# Patient Record
Sex: FEMALE | Race: WHITE | NOT HISPANIC OR LATINO | Employment: FULL TIME | URBAN - METROPOLITAN AREA
[De-identification: names, ages, dates, MRNs, and addresses within clinical notes are randomized per-mention and may not be internally consistent; named-entity substitution may affect disease eponyms.]

---

## 2017-01-16 ENCOUNTER — OFFICE VISIT (OUTPATIENT)
Dept: FAMILY MEDICINE | Facility: CLINIC | Age: 32
End: 2017-01-16
Payer: COMMERCIAL

## 2017-01-16 VITALS
TEMPERATURE: 98.3 F | HEIGHT: 60 IN | BODY MASS INDEX: 44.37 KG/M2 | DIASTOLIC BLOOD PRESSURE: 80 MMHG | WEIGHT: 226 LBS | SYSTOLIC BLOOD PRESSURE: 134 MMHG | HEART RATE: 76 BPM

## 2017-01-16 DIAGNOSIS — M65.4 RADIAL STYLOID TENOSYNOVITIS OF RIGHT HAND: Primary | ICD-10-CM

## 2017-01-16 PROCEDURE — 99213 OFFICE O/P EST LOW 20 MIN: CPT | Performed by: PHYSICIAN ASSISTANT

## 2017-01-16 NOTE — MR AVS SNAPSHOT
After Visit Summary   1/16/2017    Ruby Morrison    MRN: [id number]           Patient Information     Date Of Birth          1985        Visit Information        Provider Department      1/16/2017 3:20 PM Renée Hudson PA-C [facility]        Today's Diagnoses     Radial styloid tenosynovitis of right hand    -  1       Care Instructions    -- Aleve twice daily with food x 1 week  -- Wear brace consistently  -- Ice on tendon 3 times daily x 20 minutes  -- Schedule with ortho if not improving.                 De Quervain's Tenosynovitis      What is de Quervain's tenosynovitis?   De Quervain's tenosynovitis is a painful condition that affects the tendons on the thumb side of your wrist. Tendons, are strong bands of connective tissue that attach muscle to bone. A sheath, or covering, surrounds the tendons that go to your thumb. Tenosynovitis is an irritation of this sheath.   How does it occur?   De Quervain's tenosynovitis is usually cause by overusing your thumb or wrist. This is more likely in activities when your wrist is bent and you use your thumb to  something (such as when you ski or hammer).   Other causes of this condition include:   wrist injuries   rheumatoid arthritis.   What are the symptoms?   Symptoms may include:   pain when you move your thumb or wrist   pain when you make a fist   swelling and pain on the thumb side of your wrist   feeling or hearing creaking as the tendon moves   How is it diagnosed?   Your healthcare provider will examine your wrist and thumb and check for areas that are tender and painful to move. You may have an X-ray to be sure you don't have a broken bone.   How is it treated?   The first treatment is a splint that will cover your wrist and thumb. You need to protect your thumb and wrist. Treatment may also include:   Put an ice pack, gel pack, or package of frozen vegetables, wrapped in a cloth on your thumb and wrist every 3 to  4 hours, for up to 20 minutes at a time.   You could also do ice massage. To do this, first freeze water in a Styrofoam cup, then peel the top of the cup away to expose the ice. Hold the bottom of the cup and rub the ice over your tendon for 5 to 10 minutes. Do this 3 to 5 times a day for the first 2 days.   Take an anti-inflammatory medicine such as ibuprofen, or other medicine as directed by your provider. Nonsteroidal anti-inflammatory medicines (NSAIDs) may cause stomach bleeding and other problems. These risks increase with age. Read the label and take as directed. Unless recommended by your healthcare provider, do not take for more than 10 days.   Your provider may give you an injection of a corticosteroid medicine.   Follow your provider's instructions for doing exercises to help you recover.   How long will the effects last?   The length of recovery depends on factors such as your age, health, and if you have had a previous injury. Recovery time also depends on the severity of the injury. A mild injury may recover within a few weeks, but a severe injury may take 6 weeks or longer to recover.   When can I return to my normal activities?   Everyone recovers from an injury at a different rate. Return to your activities depends on how soon your wrist recovers, not by how many days or weeks it has been since your injury has occurred. In general, the longer you have symptoms before you start treatment, the longer it will take to get better. The goal of rehabilitation is to return you to your normal activities as soon as is safely possible.   You need to stop doing the activities that cause pain until the tendon has healed. If you continue doing activities that cause pain, your symptoms will return and it will take longer to recover. You may return to your normal activities when it is no longer painful to move your thumb or wrist. You may need to do activities wearing a supportive splint until you no longer have  symptoms.   How can I prevent de Quervain's tenosynovitis?   Avoiding activities that overuse your thumb or wrist may prevent de Quervain's tenosynovitis.     De Quervain's Tenosynovitis Rehabilitation Exercises          You may do these exercises when it is not painful to move your hand.   Opposition stretch: Rest your hand on a table, palm up. Touch the tip of your thumb to the tip of your little finger. Hold this position for 6 seconds and then release. Repeat 10 times.   Wrist stretch: Press the back of the hand on your injured side with your other hand to help bend your wrist. Hold for 15 to 30 seconds. Next, stretch the hand back by pressing the fingers in a backward direction. Hold for 15 to 30 seconds. Keep the arm on your injured side straight during this exercise. Do 3 sets.   Wrist flexion: Hold a can or hammer handle in your hand with your palm facing up. Bend your wrist upward. Slowly lower the weight and return to the starting position. Do 3 sets of 10. Gradually increase the weight of the can or weight you are holding.   Wrist radial deviation strengthening: Put your wrist in the sideways position with your thumb up. Hold a can of soup or a hammer handle and gently bend your wrist up, with the thumb reaching toward the ceiling. Slowly lower to the starting position. Do not move your forearm throughout this exercise. Do 3 sets of 10.   Wrist extension: Hold a soup can or hammer handle in your hand with your palm facing down. Slowly bend your wrist up. Slowly lower the weight down into the starting position. Do 3 sets of 10. Gradually increase the weight of the object you are holding.    strengthening: Squeeze a soft rubber ball and hold the squeeze for 5 seconds. Do 3 sets of 10.   Finger spring: Place a large rubber band around the outside of your thumb and fingers. Open your fingers to stretch the rubber band. Do 3 sets of 10.   Published by [employer].  This content is reviewed periodically and  is subject to change as new health information becomes available. The information is intended to inform and educate and is not a replacement for medical evaluation, advice, diagnosis or treatment by a healthcare professional.   Written by Yoav English PT, and Ashlee Bhat PT, [facility], [facility], for [employer].   ? 2010 [employer] and/or its affiliates. All Rights Reserved.   Copyright   Clinical Reference Systems 2011          Published by [employer].  This content is reviewed periodically and is subject to change as new health information becomes available. The information is intended to inform and educate and is not a replacement for medical evaluation, advice, diagnosis or treatment by a healthcare professional.   Written by Joe Caballero MD, for [employer].   ? 2010 [employer] and/or its affiliates. All Rights Reserved.   Copyright   Clinical Reference Systems 2011      -        Follow-ups after your visit        Additional Services     ORTHO  REFERRAL       [facility] is referring you to the Orthopedic  Services at [facility].       The  Representative will assist you in the coordination of your Orthopedic and Musculoskeletal Care as prescribed by your physician.    The  Representative will call you within 1 business day to help schedule your appointment, or you may contact the  Representative at:    All areas ~ [phone]     Type of Referral : Non Surgical       Timeframe requested: Routine    Coverage of these services is subject to the terms and limitations of your health insurance plan.  Please call member services at your health plan with any benefit or coverage questions.      If X-rays, CT or MRI's have been performed, please contact the facility where they were done to arrange for , prior to your scheduled appointment.  Please bring this referral request to your appointment and present it to your  specialist.                  Who to contact     If you have questions or need follow up information about today's clinic visit or your schedule please contact [facility] directly at [phone].  Normal or non-critical lab and imaging results will be communicated to you by MyChart, letter or phone within 4 business days after the clinic has received the results. If you do not hear from us within 7 days, please contact the clinic through [employer] or phone. If you have a critical or abnormal lab result, we will notify you by phone as soon as possible.  Submit refill requests through [employer] or call your pharmacy and they will forward the refill request to us. Please allow 3 business days for your refill to be completed.          Additional Information About Your Visit        [email or web address] Information     [employer] gives you secure access to your electronic health record. If you see a primary care provider, you can also send messages to your care team and make appointments. If you have questions, please call your primary care clinic.  If you do not have a primary care provider, please call [phone] and they will assist you.        Care EveryWhere ID     This is your Care EveryWhere ID. This could be used by other organizations to access your [place] medical records  [id number]        Your Vitals Were     Pulse Temperature Height BMI (Body Mass Index)          76 98.3  F (36.8  C) (Oral) 5' (1.524 m) 44.14 kg/m2         Blood Pressure from Last 3 Encounters:   01/16/17 134/80   09/13/16 102/70   08/04/16 132/89    Weight from Last 3 Encounters:   01/16/17 226 lb (102.513 kg)   09/13/16 208 lb (94.348 kg)   08/04/16 203 lb (92.08 kg)              We Performed the Following     ORTHO  REFERRAL          Today's Medication Changes          These changes are accurate as of: 1/16/17  3:51 PM.  If you have any questions, ask your nurse or doctor.               Start taking these medicines.         Dose/Directions    order for DME   Used for:  Radial styloid tenosynovitis of right hand   Started by:  Renée Hudson PA-C        Equipment being ordered: Wrist brace   Quantity:  1 each   Refills:  0         Stop taking these medicines if you haven't already. Please contact your care team if you have questions.     docusate sodium 100 MG tablet   Commonly known as:  COLACE   Stopped by:  Renée Hudson PA-C           prenatal multivitamin  plus iron 27-0.8 MG Tabs per tablet   Stopped by:  Renée Hudson PA-C                Where to get your medicines      Some of these will need a paper prescription and others can be bought over the counter.  Ask your nurse if you have questions.     Bring a paper prescription for each of these medications    - order for DME             Primary Care Provider Office Phone # Fax #    Mesha John [phone]       [address] [address]        Thank you!     Thank you for choosing [facility]  for your care. Our goal is always to provide you with excellent care. Hearing back from our patients is one way we can continue to improve our services. Please take a few minutes to complete the written survey that you may receive in the mail after your visit with us. Thank you!             Your Updated Medication List - Protect others around you: Learn how to safely use, store and throw away your medicines at www.disposemymeds.org.          This list is accurate as of: 1/16/17  3:51 PM.  Always use your most recent med list.                   Brand Name Dispense Instructions for use    albuterol 108 (90 BASE) MCG/ACT Inhaler    PROAIR HFA/PROVENTIL HFA/VENTOLIN HFA    1 Inhaler    Inhale 2 puffs into the lungs every 4 hours as needed for shortness of breath / dyspnea       montelukast 10 MG tablet    SINGULAIR    90 tablet    Take 1 tablet (10 mg) by mouth At Bedtime Profile Rx: patient will contact  pharmacy when needed       order for DME     1 each    Equipment being ordered: Wrist brace       VITAMIN D (CHOLECALCIFEROL) PO      Take 1,000 Units by mouth daily

## 2017-01-16 NOTE — PATIENT INSTRUCTIONS
-- Aleve twice daily with food x 1 week  -- Wear brace consistently  -- Ice on tendon 3 times daily x 20 minutes  -- Schedule with ortho if not improving.                 De Quervain's Tenosynovitis      What is de Quervain's tenosynovitis?   De Quervain's tenosynovitis is a painful condition that affects the tendons on the thumb side of your wrist. Tendons, are strong bands of connective tissue that attach muscle to bone. A sheath, or covering, surrounds the tendons that go to your thumb. Tenosynovitis is an irritation of this sheath.   How does it occur?   De Quervain's tenosynovitis is usually cause by overusing your thumb or wrist. This is more likely in activities when your wrist is bent and you use your thumb to  something (such as when you ski or hammer).   Other causes of this condition include:   wrist injuries   rheumatoid arthritis.   What are the symptoms?   Symptoms may include:   pain when you move your thumb or wrist   pain when you make a fist   swelling and pain on the thumb side of your wrist   feeling or hearing creaking as the tendon moves   How is it diagnosed?   Your healthcare provider will examine your wrist and thumb and check for areas that are tender and painful to move. You may have an X-ray to be sure you don't have a broken bone.   How is it treated?   The first treatment is a splint that will cover your wrist and thumb. You need to protect your thumb and wrist. Treatment may also include:   Put an ice pack, gel pack, or package of frozen vegetables, wrapped in a cloth on your thumb and wrist every 3 to 4 hours, for up to 20 minutes at a time.   You could also do ice massage. To do this, first freeze water in a Styrofoam cup, then peel the top of the cup away to expose the ice. Hold the bottom of the cup and rub the ice over your tendon for 5 to 10 minutes. Do this 3 to 5 times a day for the first 2 days.   Take an anti-inflammatory medicine such as ibuprofen, or other medicine as  directed by your provider. Nonsteroidal anti-inflammatory medicines (NSAIDs) may cause stomach bleeding and other problems. These risks increase with age. Read the label and take as directed. Unless recommended by your healthcare provider, do not take for more than 10 days.   Your provider may give you an injection of a corticosteroid medicine.   Follow your provider's instructions for doing exercises to help you recover.   How long will the effects last?   The length of recovery depends on factors such as your age, health, and if you have had a previous injury. Recovery time also depends on the severity of the injury. A mild injury may recover within a few weeks, but a severe injury may take 6 weeks or longer to recover.   When can I return to my normal activities?   Everyone recovers from an injury at a different rate. Return to your activities depends on how soon your wrist recovers, not by how many days or weeks it has been since your injury has occurred. In general, the longer you have symptoms before you start treatment, the longer it will take to get better. The goal of rehabilitation is to return you to your normal activities as soon as is safely possible.   You need to stop doing the activities that cause pain until the tendon has healed. If you continue doing activities that cause pain, your symptoms will return and it will take longer to recover. You may return to your normal activities when it is no longer painful to move your thumb or wrist. You may need to do activities wearing a supportive splint until you no longer have symptoms.   How can I prevent de Quervain's tenosynovitis?   Avoiding activities that overuse your thumb or wrist may prevent de Quervain's tenosynovitis.     De Quervain's Tenosynovitis Rehabilitation Exercises          You may do these exercises when it is not painful to move your hand.   Opposition stretch: Rest your hand on a table, palm up. Touch the tip of your thumb to the tip  of your little finger. Hold this position for 6 seconds and then release. Repeat 10 times.   Wrist stretch: Press the back of the hand on your injured side with your other hand to help bend your wrist. Hold for 15 to 30 seconds. Next, stretch the hand back by pressing the fingers in a backward direction. Hold for 15 to 30 seconds. Keep the arm on your injured side straight during this exercise. Do 3 sets.   Wrist flexion: Hold a can or hammer handle in your hand with your palm facing up. Bend your wrist upward. Slowly lower the weight and return to the starting position. Do 3 sets of 10. Gradually increase the weight of the can or weight you are holding.   Wrist radial deviation strengthening: Put your wrist in the sideways position with your thumb up. Hold a can of soup or a hammer handle and gently bend your wrist up, with the thumb reaching toward the ceiling. Slowly lower to the starting position. Do not move your forearm throughout this exercise. Do 3 sets of 10.   Wrist extension: Hold a soup can or hammer handle in your hand with your palm facing down. Slowly bend your wrist up. Slowly lower the weight down into the starting position. Do 3 sets of 10. Gradually increase the weight of the object you are holding.    strengthening: Squeeze a soft rubber ball and hold the squeeze for 5 seconds. Do 3 sets of 10.   Finger spring: Place a large rubber band around the outside of your thumb and fingers. Open your fingers to stretch the rubber band. Do 3 sets of 10.   Published by [employer].  This content is reviewed periodically and is subject to change as new health information becomes available. The information is intended to inform and educate and is not a replacement for medical evaluation, advice, diagnosis or treatment by a healthcare professional.   Written by Yoav English PT, and Ashlee Bhat PT, [facility], [facility], for [employer].   ? 2010 [employer] and/or its affiliates. All Rights Reserved.   Copyright    Clinical Reference Systems 2011          Published by [facility].  This content is reviewed periodically and is subject to change as new health information becomes available. The information is intended to inform and educate and is not a replacement for medical evaluation, advice, diagnosis or treatment by a healthcare professional.   Written by Joe Caballero MD, for [facility].   ? 2010 [facility] and/or its affiliates. All Rights Reserved.   Copyright   Clinical Reference Systems 2011      -

## 2017-01-16 NOTE — NURSING NOTE
Chief Complaint   Patient presents with     Mass       Initial /80 mmHg  Pulse 76  Temp(Src) 98.3  F (36.8  C) (Oral)  Ht 5' (1.524 m)  Wt 226 lb (102.513 kg)  BMI 44.14 kg/m2 Estimated body mass index is 44.14 kg/(m^2) as calculated from the following:    Height as of this encounter: 5' (1.524 m).    Weight as of this encounter: 226 lb (102.513 kg).  BP completed using cuff size: laurel Lee MA

## 2017-01-16 NOTE — PROGRESS NOTES
SUBJECTIVE:                                                    Ruby Morrison is a 31 year old female who presents to clinic today for the following health issues:    Concern - mass     Onset: three weeks     Description:   Patient has a bump on her right wrist.    Intensity: moderate    Progression of Symptoms:  same    Accompanying Signs & Symptoms:  Pain, swelling, stiffness in her wrist       Previous history of similar problem:   none    Precipitating factors:   Worsened by: no    Alleviating factors:  Improved by: nothing       Therapies Tried and outcome: nothing        -------------------------------------    Problem list, Medication list, Allergies, and Medical/Social/Surgical histories reviewed in [facility] and updated as appropriate.    ROS:  A pertinent ROS of the General    Musculoskeletal   Neurological  Integumentary systems is otherwise unremarkable.      OBJECTIVE:                                                    /80 mmHg  Pulse 76  Temp(Src) 98.3  F (36.8  C) (Oral)  Ht 5' (1.524 m)  Wt 226 lb (102.513 kg)  BMI 44.14 kg/m2   GENERAL: healthy, alert and no distress  Wrist Exam: WRIST:  Inspection: no swelling  mass/prominence noted: location: over radial styloid tendon*.    Palpation: Tender: distal radius, 1st dorsal compartment  Non-tender: remainder of exam is non tender   Range of Motion: flexion:  decreased, painful, extension:  decreased, painful, radial deviation: full, ulnar deviation: decreased, painful  Strength: diminished due to pain  Special tests: positive Finkelstein's.      ELBOW:  elbow exam not done      Diagnostic test results:  Diagnostic Test Results:  none      ASSESSMENT/PLAN:                                                          ICD-10-CM    1. Radial styloid tenosynovitis of right hand M65.4 order for DME     ORTHO  REFERRAL     Possible small cyst over the tendon sheath, but likely tenosynovitis causing symptoms.  Gave home exercise plan and start  regimen below.  Patient Instructions   -- Aleve twice daily with food x 1 week  -- Wear brace consistently  -- Ice on tendon 3 times daily x 20 minutes  -- Schedule with ortho if not improving.         Follow up with Provider - GIORGI Hudson PA-C

## 2017-03-06 DIAGNOSIS — Z31.41 FERTILITY TESTING: Primary | ICD-10-CM

## 2017-03-06 PROCEDURE — 82565 ASSAY OF CREATININE: CPT

## 2017-03-06 PROCEDURE — 82306 VITAMIN D 25 HYDROXY: CPT

## 2017-03-06 PROCEDURE — 84450 TRANSFERASE (AST) (SGOT): CPT

## 2017-03-06 PROCEDURE — 84460 ALANINE AMINO (ALT) (SGPT): CPT

## 2017-03-06 PROCEDURE — 36415 COLL VENOUS BLD VENIPUNCTURE: CPT

## 2017-03-07 LAB
ALT SERPL W P-5'-P-CCNC: 25 U/L (ref 0–50)
AST SERPL W P-5'-P-CCNC: 13 U/L (ref 0–45)
CREAT SERPL-MCNC: 0.71 MG/DL (ref 0.52–1.04)
DEPRECATED CALCIDIOL+CALCIFEROL SERPL-MC: 21 UG/L (ref 20–75)
GFR SERPL CREATININE-BSD FRML MDRD: NORMAL ML/MIN/1.7M2

## 2017-04-06 DIAGNOSIS — N97.0 FEMALE INFERTILITY ASSOCIATED WITH ANOVULATION: Primary | ICD-10-CM

## 2017-04-06 PROCEDURE — 36415 COLL VENOUS BLD VENIPUNCTURE: CPT

## 2017-04-06 PROCEDURE — 84144 ASSAY OF PROGESTERONE: CPT

## 2017-04-07 LAB — PROGEST SERPL-MCNC: 0.5 NG/ML

## 2017-05-20 ENCOUNTER — OFFICE VISIT (OUTPATIENT)
Dept: ORTHOPEDICS | Facility: CLINIC | Age: 32
End: 2017-05-20
Payer: COMMERCIAL

## 2017-05-20 VITALS
HEART RATE: 81 BPM | BODY MASS INDEX: 44.37 KG/M2 | SYSTOLIC BLOOD PRESSURE: 127 MMHG | DIASTOLIC BLOOD PRESSURE: 92 MMHG | HEIGHT: 60 IN | WEIGHT: 226 LBS

## 2017-05-20 DIAGNOSIS — M25.531 RIGHT WRIST PAIN: ICD-10-CM

## 2017-05-20 DIAGNOSIS — M65.4 RADIAL STYLOID TENOSYNOVITIS: Primary | ICD-10-CM

## 2017-05-20 PROCEDURE — 99203 OFFICE O/P NEW LOW 30 MIN: CPT | Performed by: FAMILY MEDICINE

## 2017-05-20 RX ORDER — FOLLITROPIN 300 [IU]/.36ML
INJECTION, SOLUTION SUBCUTANEOUS
COMMUNITY
Start: 2017-05-18 | End: 2017-10-20

## 2017-05-20 NOTE — PROGRESS NOTES
Ruby Morrison  :  1985  DOS: 2017  MRN: [id number]    Sports Medicine Clinic Visit    PCP: Mesha John    Ruby Morrison is a 31 year old Right hand dominant female who is seen in consultation at the request of Renée Hudson presenting with right wrist pain.    Injury: 5 month(s), no injury.  Pain located over right radial styloid and radial wrist, nonradiating.  Additional Features:  Positive: aches, sharp/shooting pain.  Symptoms are better with brace overnight.  Symptoms are worse with: any movement of the wrist.  Other evaluation and/or treatments so far consists of: referred by primary care.  Recent imaging completed: No recent imaging completed.  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    Past Medical History:   Diagnosis Date     Asthma      Cat allergies      GERD (gastroesophageal reflux disease)      Past Surgical History:   Procedure Laterality Date     HYMENOTOMY         Objective  BP (!) 127/92  Pulse 81  Ht 5' (1.524 m)  Wt 226 lb (102.5 kg)  BMI 44.14 kg/m2    General: healthy, alert and in no distress    HEENT: no scleral icterus or conjunctival erythema   Skin: no suspicious lesions or rash. No jaundice.   CV: regular rhythm by palpation, 2+ distal pulses, no pedal edema    Resp: normal respiratory effort without conversational dyspnea   Psych: normal mood and affect    Gait: nonantalgic, appropriate coordination and balance   Neuro: normal light touch sensory exam of the extremities. Motor strength as noted below     Right Wrist and Hand exam    Inspection:       Swelling: mild over 1st dorsal compartment    Tender:       Radial styloid and 1st dorsal compartment    Non Tender:       Remainder of the Wrist and Hand right,      anatomic  snuffbox right,      scapholunate interval right and      TFCC right    ROM:       Full and symmetric active and passive range of motion of the forearm, wrist and digits right and      Pain with active and passive ulnar deviation, over 1st dorsal compartment    Strength:       5/5 strength in the muscles of the hand, wrist and forearm bilateral    Special Tests:        neg (-) Tinel's test right,       neg (-) Phatlen's test right,       neg (-) TFCC compression test right and       positive (+) Finkelstein's maneuver right    Neurovascular:       2+ radial pulses bilaterally with brisk capillary refill and      normal sensation to light touch in the radial, median and ulnar nerve distributions      Radiology:  None performed today    Assessment:  1. Radial styloid tenosynovitis    2. Right wrist pain        Plan:  Discussed the assessment with the patient.  Follow up: prn  Overall pain is better than when originally presented, but is persistent  Failed oral NSAIDs at antiinflammatory with some stomach discomfort  Topical NSAID trial for longer period, which is safe  Prefer to avoid prednisone given fertility tx and age, also improving somewhat  Wear brace more consistently while using antiinflammatory  Consider US guided 1st dorsal compartment injection any time for ongoing sx  Activity modification reviewed  OT available in the future  If needed  We discussed modified progressive pain-free activity as tolerated  Home handouts provided and supportive care reviewed  All questions were answered today  Contact us with additional questions or concerns  Signs and sx of concern reviewed    Thanks very much for sending this nice lady to us, I will keep you updated with her progress      Yoav Corcoran DO, CAQ  Primary Care Sports Medicine  [facility]           Disclaimer: This note consists of symbols derived from keyboarding, dictation and/or voice recognition software. As a result, there may be  errors in the script that have gone undetected. Please consider this when interpreting information found in this chart.

## 2017-05-20 NOTE — MR AVS SNAPSHOT
After Visit Summary   5/20/2017    Ruby Morrison    MRN: [id number]           Patient Information     Date Of Birth          1985        Visit Information        Provider Department      5/20/2017 8:00 AM Yoav Corcoran,  [facility] [place]        Today's Diagnoses     Radial styloid tenosynovitis    -  1    Right wrist pain           Follow-ups after your visit        Who to contact     If you have questions or need follow up information about today's clinic visit or your schedule please contact [facility] directly at [phone].  Normal or non-critical lab and imaging results will be communicated to you by Webt[employer], letter or phone within 4 business days after the clinic has received the results. If you do not hear from us within 7 days, please contact the clinic through [employer] or phone. If you have a critical or abnormal lab result, we will notify you by phone as soon as possible.  Submit refill requests through [employer] or call your pharmacy and they will forward the refill request to us. Please allow 3 business days for your refill to be completed.          Additional Information About Your Visit        MyChart Information     [employer] gives you secure access to your electronic health record. If you see a primary care provider, you can also send messages to your care team and make appointments. If you have questions, please call your primary care clinic.  If you do not have a primary care provider, please call [phone] and they will assist you.        Care EveryWhere ID     This is your Care EveryWhere ID. This could be used by other organizations to access your [facility] medical records  [id number]        Your Vitals Were     Pulse Height BMI (Body Mass Index)             81 5' (1.524 m) 44.14 kg/m2          Blood Pressure from Last 3 Encounters:   05/20/17 (!) 127/92   01/16/17 134/80   09/13/16 102/70    Weight from  Last 3 Encounters:   05/20/17 226 lb (102.5 kg)   01/16/17 226 lb (102.5 kg)   09/13/16 208 lb (94.3 kg)              Today, you had the following     No orders found for display         Today's Medication Changes          These changes are accurate as of: 5/20/17  9:04 AM.  If you have any questions, ask your nurse or doctor.               Start taking these medicines.        Dose/Directions    diclofenac 1 % Gel topical gel   Commonly known as:  VOLTAREN   Used for:  Radial styloid tenosynovitis, Right wrist pain   Started by:  Yoav Corcoran,         Apply  2 grams to wrist area up to four times daily as needed using enclosed dosing card.   Quantity:  100 g   Refills:  1            Where to get your medicines      These medications were sent to "[id number] - [place] - [address] AT [facility] [address]  [address], [place] [address]     Phone:  [phone]     diclofenac 1 % Gel topical gel                Primary Care Provider Office Phone # Fax #    Mesha John  [phone]       [facility] [address] [address]        Thank you!     Thank you for choosing [facility]  for your care. Our goal is always to provide you with excellent care. Hearing back from our patients is one way we can continue to improve our services. Please take a few minutes to complete the written survey that you may receive in the mail after your visit with us. Thank you!             Your Updated Medication List - Protect others around you: Learn how to safely use, store and throw away your medicines at www.disposemymeds.org.          This list is accurate as of: 5/20/17  9:04 AM.  Always use your most recent med list.                   Brand Name Dispense Instructions for use    albuterol 108 (90 BASE) MCG/ACT Inhaler    PROAIR HFA/PROVENTIL HFA/VENTOLIN HFA    1 Inhaler     Inhale 2 puffs into the lungs every 4 hours as needed for shortness of breath / dyspnea       diclofenac 1 % Gel topical gel    VOLTAREN    100 g    Apply  2 grams to wrist area up to four times daily as needed using enclosed dosing card.       FOLLISTIM  UNT/0.36ML cartridge   Generic drug:  follitropin beta          metFORMIN 500 MG tablet    GLUCOPHAGE         montelukast 10 MG tablet    SINGULAIR    90 tablet    Take 1 tablet (10 mg) by mouth At Bedtime Profile Rx: patient will contact pharmacy when needed       order for DME     1 each    Equipment being ordered: Wrist brace       VITAMIN D (CHOLECALCIFEROL) PO      Take 1,000 Units by mouth daily

## 2017-09-14 DIAGNOSIS — J45.30 MILD PERSISTENT ASTHMA WITHOUT COMPLICATION: ICD-10-CM

## 2017-09-14 NOTE — TELEPHONE ENCOUNTER
montelukast (SINGULAIR) 10 MG tablet   10 mg, AT BEDTIME 3 ordered  Edit     Summary: Take 1 tablet (10 mg) by mouth At Bedtime Profile Rx: patient will contact pharmacy when needed, Disp-90 tablet, R-3, E-Prescribe   Dose, Route, Frequency: 10 mg, Oral, AT BEDTIME  Start: 9/13/2016  Ord/Sold: 9/13/2016 (O)  Report  Taking:   Long-term:   Pharmacy: [id number] - [place] - [address] AT [facility] [address]  Med Dose History       Patient Sig: Take 1 tablet (10 mg) by mouth At Bedtime Profile Rx: patient will contact pharmacy when needed       Ordered on: 9/13/2016       Authorized by: BUNNY TERAN       Dispense: 90 tablet       Admin Instructions: Profile Rx: patient will contact pharmacy when needed          Last Office Visit with G, P or [facility] prescribing provider:  1-   Future Office Visit:    Next 5 appointments (look out 90 days)     Sep 18, 2017  8:20 AM CDT   PHYSICAL with Mesha John DO   [facility] ([facility])    [address] [place] [id number] [phone]                   Date of Last Asthma Action Plan Letter:   Asthma Action Plan Q1 Year    Topic Date Due     Asthma Action Plan - yearly  10/31/2015      Asthma Control Test:   ACT Total Scores 9/13/2016   ACT TOTAL SCORE -   ASTHMA ER VISITS -   ASTHMA HOSPITALIZATIONS -   ACT TOTAL SCORE (Goal Greater than or Equal to 20) 25   In the past 12 months, how many times did you visit the emergency room for your asthma without being admitted to the hospital? 0   In the past 12 months, how many times were you hospitalized overnight because of your asthma? 0       Date of Last Spirometry Test:   No results found for this or any previous visit.

## 2017-09-18 NOTE — TELEPHONE ENCOUNTER
Chart reviewed. Ruby canceled her appointment for a PE today with Dr John and did not reschedule. She saw Renée Hudson for PE on 9/16/16 and the Singulair was re-ordered at that time

## 2017-09-19 RX ORDER — MONTELUKAST SODIUM 10 MG/1
TABLET ORAL
Qty: 90 TABLET | Refills: 0 | Status: SHIPPED | OUTPATIENT
Start: 2017-09-19 | End: 2017-12-18

## 2017-10-02 ENCOUNTER — TELEPHONE (OUTPATIENT)
Dept: FAMILY MEDICINE | Facility: CLINIC | Age: 32
End: 2017-10-02

## 2017-10-02 DIAGNOSIS — Z13.6 CARDIOVASCULAR SCREENING; LDL GOAL LESS THAN 130: Primary | ICD-10-CM

## 2017-10-02 NOTE — TELEPHONE ENCOUNTER
Visit Type: [facility] (909)          10/20/2017   9:00 AM  20 mins.  Lorenzo Brown DO [facility]          Patient Comments:     Primary Care     Can I have an order for fasting labs put in to have      done prior to my appointment? That way we can discuss      them at the appointment? Let me know if that is      possible. Thanks.         Per patient Viviana request.    Savanah De Leon,

## 2017-10-03 ENCOUNTER — TRANSFERRED RECORDS (OUTPATIENT)
Dept: HEALTH INFORMATION MANAGEMENT | Facility: CLINIC | Age: 32
End: 2017-10-03

## 2017-10-13 ENCOUNTER — TRANSFERRED RECORDS (OUTPATIENT)
Dept: HEALTH INFORMATION MANAGEMENT | Facility: CLINIC | Age: 32
End: 2017-10-13

## 2017-10-20 ENCOUNTER — TELEPHONE (OUTPATIENT)
Dept: FAMILY MEDICINE | Facility: CLINIC | Age: 32
End: 2017-10-20

## 2017-10-20 ENCOUNTER — OFFICE VISIT (OUTPATIENT)
Dept: FAMILY MEDICINE | Facility: CLINIC | Age: 32
End: 2017-10-20
Payer: COMMERCIAL

## 2017-10-20 VITALS
TEMPERATURE: 97.8 F | HEART RATE: 90 BPM | BODY MASS INDEX: 41.72 KG/M2 | WEIGHT: 221 LBS | DIASTOLIC BLOOD PRESSURE: 84 MMHG | HEIGHT: 61 IN | SYSTOLIC BLOOD PRESSURE: 125 MMHG

## 2017-10-20 DIAGNOSIS — Z13.220 LIPID SCREENING: ICD-10-CM

## 2017-10-20 DIAGNOSIS — Z23 NEED FOR PROPHYLACTIC VACCINATION AND INOCULATION AGAINST INFLUENZA: Primary | ICD-10-CM

## 2017-10-20 DIAGNOSIS — J45.30 MILD PERSISTENT ASTHMA WITHOUT COMPLICATION: ICD-10-CM

## 2017-10-20 DIAGNOSIS — E28.2 PCOS (POLYCYSTIC OVARIAN SYNDROME): ICD-10-CM

## 2017-10-20 DIAGNOSIS — Z00.01 ENCOUNTER FOR ROUTINE ADULT HEALTH EXAMINATION WITH ABNORMAL FINDINGS: ICD-10-CM

## 2017-10-20 LAB — HGB BLD-MCNC: 13.1 G/DL (ref 11.7–15.7)

## 2017-10-20 PROCEDURE — 36415 COLL VENOUS BLD VENIPUNCTURE: CPT | Performed by: FAMILY MEDICINE

## 2017-10-20 PROCEDURE — 90686 IIV4 VACC NO PRSV 0.5 ML IM: CPT | Performed by: FAMILY MEDICINE

## 2017-10-20 PROCEDURE — 99395 PREV VISIT EST AGE 18-39: CPT | Mod: 25 | Performed by: FAMILY MEDICINE

## 2017-10-20 PROCEDURE — 80061 LIPID PANEL: CPT | Performed by: FAMILY MEDICINE

## 2017-10-20 PROCEDURE — 90471 IMMUNIZATION ADMIN: CPT | Performed by: FAMILY MEDICINE

## 2017-10-20 PROCEDURE — 85018 HEMOGLOBIN: CPT | Performed by: FAMILY MEDICINE

## 2017-10-20 PROCEDURE — 80048 BASIC METABOLIC PNL TOTAL CA: CPT | Performed by: FAMILY MEDICINE

## 2017-10-20 NOTE — LETTER
"[facility]  [address] [place] [address] [phone]                                                                                                October 23, 2017    Ruby Morrison  [address] [address]        Dear Ms. Morrison,    Your cholesterol is abnormal, please use the recommendations below and recheck labs in 6-12 months.     Ways to improve your cholesterol...     1- Eats less saturated fats (including avoiding \"trans\" fats).     2 - Eat more unsaturated fats  - found in vegetables, grains, and tree nuts.   Also by replacing butter with canola oil or olive oil.     3 - Eat more nuts.   1-2 ounces (a small handful) of almonds, walnuts, hazelnuts or pecans once a day in place of other less healthy snacks.     4 - Eat more high fiber foods - vegetables and whole grains including oat bran, oats, beans, peas, and flax seed.     5 - Eat more fish - such as salmon, tuna, mackerel, and sardines.  1 or 2 six ounce servings per week is a healthy replacement for other proteins.     6 - Exercise for at least 120 minutes per week - which is equal to 30 minutes 4 days per week.     Results for orders placed or performed in visit on 10/20/17   Lipid panel reflex to direct LDL   Result Value Ref Range    Cholesterol 170 <200 mg/dL    Triglycerides 208 (H) <150 mg/dL    HDL Cholesterol 39 (L) >49 mg/dL    LDL Cholesterol Calculated 89 <100 mg/dL    Non HDL Cholesterol 131 (H) <130 mg/dL   Hemoglobin   Result Value Ref Range    Hemoglobin 13.1 11.7 - 15.7 g/dL   Basic metabolic panel   Result Value Ref Range    Sodium 136 133 - 144 mmol/L    Potassium 3.9 3.4 - 5.3 mmol/L    Chloride 105 94 - 109 mmol/L    Carbon Dioxide 20 20 - 32 mmol/L    Anion Gap 11 3 - 14 mmol/L    Glucose 82 70 - 99 mg/dL    Urea Nitrogen 6 (L) 7 - 30 mg/dL    Creatinine 0.58 0.52 - 1.04 mg/dL    GFR Estimate >90 >60 mL/min/1.7m2    GFR Estimate If Black >90 >60 mL/min/1.7m2    Calcium " 8.4 (L) 8.5 - 10.1 mg/dL         Sincerely,      Lorenzo Brown, DO/mv

## 2017-10-20 NOTE — PATIENT INSTRUCTIONS
Preventive Health Recommendations  Female Ages 26 - 39  Yearly exam:   See your health care provider every year in order to    Review health changes.     Discuss preventive care.      Review your medicines if you your doctor has prescribed any.    Until age 30: Get a Pap test every three years (more often if you have had an abnormal result).    After age 30: Talk to your doctor about whether you should have a Pap test every 3 years or have a Pap test with HPV screening every 5 years.   You do not need a Pap test if your uterus was removed (hysterectomy) and you have not had cancer.  You should be tested each year for STDs (sexually transmitted diseases), if you're at risk.   Talk to your provider about how often to have your cholesterol checked.  If you are at risk for diabetes, you should have a diabetes test (fasting glucose).  Shots: Get a flu shot each year. Get a tetanus shot every 10 years.   Nutrition:     Eat at least 5 servings of fruits and vegetables each day.    Eat whole-grain bread, whole-wheat pasta and brown rice instead of white grains and rice.    Talk to your provider about Calcium and Vitamin D.     Lifestyle    Exercise at least 150 minutes a week (30 minutes a day, 5 days of the week). This will help you control your weight and prevent disease.    Limit alcohol to one drink per day.    No smoking.     Wear sunscreen to prevent skin cancer.    See your dentist every six months for an exam and cleaning.    [facility]   Discharged by : Colette DOZIER MA    If you have any questions regarding your visit please contact your care team:     [facility] Clinic Hours Telephone Number   Dr. Isabel Marx   7am-7pm Monday - Thursday   7am-5pm Fridays  [phone]   (Appointment scheduling available 24/7)   RN Line   [phone] option 2       For a Price Quote for your services, please call our Consumer Price Line at  [phone].     What options do I have for visits at the clinic other than the traditional office visit?     To expand how we care for you, many of our providers are utilizing electronic visits (e-visits) and telephone visits, when medically appropriate, for interactions with their patients rather than a visit in the clinic. We also offer nurse visits for many medical concerns. Just like any other service, we will bill your insurance company for this type of visit based on time spent on the phone with your provider. Not all insurance companies cover these visits. Please check with your medical insurance if this type of visit is covered. You will be responsible for any charges that are not paid by your insurance.   E-visits via [employer]: generally incur a $35.00 fee.     Telephone visits:   Time spent on the phone: *charged based on time that is spent on the phone in increments of 10 minutes. Estimated cost:   5-10 mins $30.00   11-20 mins. $59.00   21-30 mins. $85.00     Use [employer] (secure email communication and access to your chart) to send your primary care provider a message or make an appointment. Ask someone on your Team how to sign up for [employer].     As always, Thank you for trusting us with your health care needs!      [facility]:    Scheduling Appointments  [place] [facility]  Call: [phone]    [facility]  Call: [phone]    [facility]  Call: [phone]    For Gastroenterology referrals   [facility]   Clinics and Surgery Center, 4th Floor   [address] [place] [address]   Appointments: [phone]    WHERE TO GO FOR CARE?  Clinic    Make an appointment if you:       Are sick (cold, cough, flu, sore throat, earache or in pain).       Have a small injury (sprain, small cut, burn or broken bone).       Need a physical exam, Pap smear, vaccine or prescription refill.       Have  questions about your health or medicines.    To reach us:      Call [phone] ([phone]). Open 24 hours every day. (For counseling services, call [phone].)    Log into [employer] at [email or web address]. (Visit [email or web address] to create an account.) Hospital emergency room    An emergency is a serious or life- threatening problem that must be treated right away.    Call 911 or get to the hospital if you have:      Very bad or sudden:            - Chest pain or pressure         - Bleeding         - Head or belly pain         - Dizziness or trouble seeing, walking or                          Speaking      Problems breathing      Blood in your vomit or you are coughing up blood      A major injury (knocked out, loss of a finger or limb, rape, broken bone protruding from skin)    A mental health crisis. (Or call the Mental Health Crisis line at 1-315.851.4053 or Suicide Prevention Hotline at 1-973.246.2444.)    Open 24 hours every day. You don't need an appointment.     Urgent care    Visit urgent care for sickness or small injuries when the clinic is closed. You don't need an appointment. To check hours or find an urgent care near you, visit [email or web address]. Online care    Get online care from [facility] for more than 70 common problems, like colds, allergies and infections. Open 24 hours every day at:   [email or web address]   Need help deciding?    For advice about where to be seen, you may call your clinic and ask to speak with a nurse. We're here for you 24 hours every day.         If you are deaf or hard of hearing, please let us know. We provide many free services including sign language interpreters, oral interpreters, TTYs, telephone amplifiers, note takers and written materials.

## 2017-10-20 NOTE — PROGRESS NOTES
SUBJECTIVE:   CC: Ruby Morrison is an 32 year old woman who presents for preventive health visit.     Physical   Annual:     Getting at least 3 servings of Calcium per day::  Yes    Bi-annual eye exam::  Yes    Dental care twice a year::  Yes    Sleep apnea or symptoms of sleep apnea::  None    Diet::  Regular (no restrictions)    Frequency of exercise::  1 day/week    Duration of exercise::  30-45 minutes    Taking medications regularly::  Yes    Medication side effects::  None    Additional concerns today::  No    She has 1.5 year old boy, and now 7 weeks preg     * biometric form    Today's PHQ-2 Score:   PHQ-2 (  [employer]) 10/17/2017   Q1: Little interest or pleasure in doing things 0   Q2: Feeling down, depressed or hopeless 0   PHQ-2 Score 0   Q1: Little interest or pleasure in doing things Not at all   Q2: Feeling down, depressed or hopeless Not at all   PHQ-2 Score 0       Abuse: Current or Past(Physical, Sexual or Emotional)- No  Do you feel safe in your environment - Yes    Social History   Substance Use Topics     Smoking status: Never Smoker     Smokeless tobacco: Never Used     Alcohol use No     The patient does not drink >3 drinks per day nor >7 drinks per week.    Reviewed orders with patient.  Reviewed health maintenance and updated orders accordingly - Yes  Labs reviewed in EPIC    Mammogram not appropriate for this patient based on age.    Pertinent mammograms are reviewed under the imaging tab.  History of abnormal Pap smear: NO - age 30- 65 PAP every 3 years recommended    Reviewed and updated as needed this visit by clinical staff  Tobacco  Allergies  Med Hx  Surg Hx  Fam Hx  Soc Hx        Reviewed and updated as needed this visit by Provider        Past Medical History:   Diagnosis Date     Asthma      Cat allergies      GERD (gastroesophageal reflux disease)       Past Surgical History:   Procedure Laterality Date     HYMENOTOMY       Obstetric History       T1       "L0     SAB0   TAB0   Ectopic0   Multiple0   Live Births0       # Outcome Date GA Lbr Serge/2nd Weight Sex Delivery Anes PTL Lv   2 Current            1 Term 06/11/16 37w0d / 03:50 5 lb 10 oz (2.551 kg) F Vag-Spont EPI        Apgar1:  2                Apgar5: 6          Review of Systems  C: NEGATIVE for fever, chills, change in weight  I: NEGATIVE for worrisome rashes, moles or lesions  E: NEGATIVE for vision changes or irritation  ENT: NEGATIVE for ear, mouth and throat problems  R: NEGATIVE for significant cough or SOB  B: NEGATIVE for masses, tenderness or discharge  CV: NEGATIVE for chest pain, palpitations or peripheral edema  GI: NEGATIVE for nausea, abdominal pain, heartburn, or change in bowel habits  : NEGATIVE for unusual urinary or vaginal symptoms. Periods are regular.  M: NEGATIVE for significant arthralgias or myalgia  N: NEGATIVE for weakness, dizziness or paresthesias  P: NEGATIVE for changes in mood or affect     OBJECTIVE:   /84  Pulse 90  Temp 97.8  F (36.6  C) (Oral)  Ht 5' 1\" (1.549 m)  Wt 221 lb (100.2 kg)  Breastfeeding? No  BMI 41.76 kg/m2  Physical Exam  GENERAL: healthy, alert and no distress  EYES: Eyes grossly normal to inspection, PERRL and conjunctivae and sclerae normal  HENT: ear canals and TM's normal, nose and mouth without ulcers or lesions  NECK: no adenopathy, no asymmetry, masses, or scars and thyroid normal to palpation  RESP: lungs clear to auscultation - no rales, rhonchi or wheezes  BREAST: normal without masses, tenderness or nipple discharge and no palpable axillary masses or adenopathy  CV: regular rate and rhythm, normal S1 S2, no S3 or S4, no murmur, click or rub, no peripheral edema and peripheral pulses strong  ABDOMEN: soft, nontender, no hepatosplenomegaly, no masses and bowel sounds normal  MS: no gross musculoskeletal defects noted, no edema  SKIN: no suspicious lesions or rashes  NEURO: Normal strength and tone, mentation intact and speech " "normal  PSYCH: mentation appears normal, affect normal/bright    ASSESSMENT/PLAN:       ICD-10-CM    1. Need for prophylactic vaccination and inoculation against influenza Z23 FLU VAC, SPLIT VIRUS IM > 3 YO (QUADRIVALENT) [33838]     Vaccine Administration, Initial [80477]   2. PCOS (polycystic ovarian syndrome) E28.2    3. Mild persistent asthma without complication J45.30 Basic metabolic panel   4. Encounter for routine adult health examination with abnormal findings Z00.01 Hemoglobin     Basic metabolic panel   5. Lipid screening Z13.220 Lipid panel reflex to direct LDL   patient with history of PCOS and seeing gyn, she just found out she is pregnant and has follow up with ob.  Asthma-only  On Singulair and albuterol prn, stable symptoms        COUNSELING:  Reviewed preventive health counseling, as reflected in patient instructions         reports that she has never smoked. She has never used smokeless tobacco.    Estimated body mass index is 41.76 kg/(m^2) as calculated from the following:    Height as of this encounter: 5' 1\" (1.549 m).    Weight as of this encounter: 221 lb (100.2 kg).   Weight management plan: Discussed healthy diet and exercise guidelines and patient will follow up in 6 months in clinic to re-evaluate.    Counseling Resources:  ATP IV Guidelines  Pooled Cohorts Equation Calculator  Breast Cancer Risk Calculator  FRAX Risk Assessment  ICSI Preventive Guidelines  Dietary Guidelines for Americans, 2010  USDA's MyPlate  ASA Prophylaxis  Lung CA Screening    Lorenzo Brown DO  [facility]  Answers for HPI/ROS submitted by the patient on 10/17/2017   PHQ-2 Score: 0    Injectable Influenza Immunization Documentation    1.  Is the person to be vaccinated sick today?   No    2. Does the person to be vaccinated have an allergy to a component   of the vaccine?   No  Egg Allergy Algorithm Link    3. Has the person to be vaccinated ever had a serious reaction   to influenza " vaccine in the past?   No    4. Has the person to be vaccinated ever had Guillain-Barré syndrome?   No    Form completed by patient

## 2017-10-20 NOTE — NURSING NOTE
"Chief Complaint   Patient presents with     Physical       Initial /84  Pulse 90  Temp 97.8  F (36.6  C) (Oral)  Ht 5' 1\" (1.549 m)  Wt 221 lb (100.2 kg)  Breastfeeding? No  BMI 41.76 kg/m2 Estimated body mass index is 41.76 kg/(m^2) as calculated from the following:    Height as of this encounter: 5' 1\" (1.549 m).    Weight as of this encounter: 221 lb (100.2 kg).  Medication Reconciliation: complete    "

## 2017-10-20 NOTE — TELEPHONE ENCOUNTER
Patient is here for physical, needs biometric forms to be filled out.  Form started and placed in MA basket on team Gold to await lab results.  Mallory Neves CMA ([facility])

## 2017-10-20 NOTE — MR AVS SNAPSHOT
After Visit Summary   10/20/2017    Ruby Morrison    MRN: [id number]           Patient Information     Date Of Birth          1985        Visit Information        Provider Department      10/20/2017 9:00 AM Lorenzo Brown DO [facility]        Today's Diagnoses     Need for prophylactic vaccination and inoculation against influenza    -  1    PCOS (polycystic ovarian syndrome)        Mild persistent asthma without complication        Encounter for routine adult health examination with abnormal findings        Lipid screening          Care Instructions      Preventive Health Recommendations  Female Ages 26 - 39  Yearly exam:   See your health care provider every year in order to    Review health changes.     Discuss preventive care.      Review your medicines if you your doctor has prescribed any.    Until age 30: Get a Pap test every three years (more often if you have had an abnormal result).    After age 30: Talk to your doctor about whether you should have a Pap test every 3 years or have a Pap test with HPV screening every 5 years.   You do not need a Pap test if your uterus was removed (hysterectomy) and you have not had cancer.  You should be tested each year for STDs (sexually transmitted diseases), if you're at risk.   Talk to your provider about how often to have your cholesterol checked.  If you are at risk for diabetes, you should have a diabetes test (fasting glucose).  Shots: Get a flu shot each year. Get a tetanus shot every 10 years.   Nutrition:     Eat at least 5 servings of fruits and vegetables each day.    Eat whole-grain bread, whole-wheat pasta and brown rice instead of white grains and rice.    Talk to your provider about Calcium and Vitamin D.     Lifestyle    Exercise at least 150 minutes a week (30 minutes a day, 5 days of the week). This will help you control your weight and prevent disease.    Limit alcohol to one drink per day.    No  "smoking.     Wear sunscreen to prevent skin cancer.    See your dentist every six months for an exam and cleaning.            Follow-ups after your visit        Who to contact     If you have questions or need follow up information about today's clinic visit or your schedule please contact [facility] directly at [phone].  Normal or non-critical lab and imaging results will be communicated to you by MyChart, letter or phone within 4 business days after the clinic has received the results. If you do not hear from us within 7 days, please contact the clinic through NeurOpticshart or phone. If you have a critical or abnormal lab result, we will notify you by phone as soon as possible.  Submit refill requests through Berg or call your pharmacy and they will forward the refill request to us. Please allow 3 business days for your refill to be completed.          Additional Information About Your Visit        MyChart Information     Berg gives you secure access to your electronic health record. If you see a primary care provider, you can also send messages to your care team and make appointments. If you have questions, please call your primary care clinic.  If you do not have a primary care provider, please call [phone] and they will assist you.        Care EveryWhere ID     This is your Care EveryWhere ID. This could be used by other organizations to access your [facility] medical records  [id number]        Your Vitals Were     Pulse Temperature Height Breastfeeding? BMI (Body Mass Index)       90 97.8  F (36.6  C) (Oral) 5' 1\" (1.549 m) No 41.76 kg/m2        Blood Pressure from Last 3 Encounters:   10/20/17 125/84   05/20/17 (!) 127/92   01/16/17 134/80    Weight from Last 3 Encounters:   10/20/17 221 lb (100.2 kg)   05/20/17 226 lb (102.5 kg)   01/16/17 226 lb (102.5 kg)              We Performed the Following     Basic metabolic panel     FLU VAC, SPLIT VIRUS IM > 3 YO (QUADRIVALENT) [83965]  "    Hemoglobin     Lipid panel reflex to direct LDL     Vaccine Administration, Initial [25698]          Today's Medication Changes          These changes are accurate as of: 10/20/17  9:45 AM.  If you have any questions, ask your nurse or doctor.               Stop taking these medicines if you haven't already. Please contact your care team if you have questions.     FOLLISTIM  UNT/0.36ML cartridge   Generic drug:  follitropin beta   Stopped by:  Lorenzo Brown DO           metFORMIN 500 MG tablet   Commonly known as:  GLUCOPHAGE   Stopped by:  Lorenzo Brown DO                    Primary Care Provider Office Phone # Fax #    Mesha John  [phone]       [address] [facility] [address]        Equal Access to Services     BRIGID HWANG : Evelin pazo Sodarnell, waaxda luqadaha, qaybta kaalmada adeegyada, tosin vaca. So [facility] [phone].    ATENCIÓN: Si habla español, tiene a mendez disposición servicios gratuitos de asistencia lingüística. Llame al [phone].    We comply with applicable federal civil rights laws and [place] laws. We do not discriminate on the basis of race, color, national origin, age, disability, sex, sexual orientation, or gender identity.            Thank you!     Thank you for choosing [facility]  for your care. Our goal is always to provide you with excellent care. Hearing back from our patients is one way we can continue to improve our services. Please take a few minutes to complete the written survey that you may receive in the mail after your visit with us. Thank you!             Your Updated Medication List - Protect others around you: Learn how to safely use, store and throw away your medicines at www.disposemymeds.org.          This list is accurate as of: 10/20/17  9:45 AM.  Always use your most recent med list.                   Brand Name Dispense Instructions for use  Diagnosis    albuterol 108 (90 BASE) MCG/ACT Inhaler    PROAIR HFA/PROVENTIL HFA/VENTOLIN HFA    1 Inhaler    Inhale 2 puffs into the lungs every 4 hours as needed for shortness of breath / dyspnea    Mild persistent asthma without complication       diclofenac 1 % Gel topical gel    VOLTAREN    100 g    Apply  2 grams to wrist area up to four times daily as needed using enclosed dosing card.    Radial styloid tenosynovitis, Right wrist pain       montelukast 10 MG tablet    SINGULAIR    90 tablet    TAKE 1 TABLET BY MOUTH EVERY NIGHT AT BEDTIME    Mild persistent asthma without complication       order for DME     1 each    Equipment being ordered: Wrist brace    Radial styloid tenosynovitis of right hand       PRENATAL VITAMIN PO           VITAMIN D (CHOLECALCIFEROL) PO      Take 1,000 Units by mouth daily

## 2017-10-21 LAB
ANION GAP SERPL CALCULATED.3IONS-SCNC: 11 MMOL/L (ref 3–14)
BUN SERPL-MCNC: 6 MG/DL (ref 7–30)
CALCIUM SERPL-MCNC: 8.4 MG/DL (ref 8.5–10.1)
CHLORIDE SERPL-SCNC: 105 MMOL/L (ref 94–109)
CHOLEST SERPL-MCNC: 170 MG/DL
CO2 SERPL-SCNC: 20 MMOL/L (ref 20–32)
CREAT SERPL-MCNC: 0.58 MG/DL (ref 0.52–1.04)
GFR SERPL CREATININE-BSD FRML MDRD: >90 ML/MIN/1.7M2
GLUCOSE SERPL-MCNC: 82 MG/DL (ref 70–99)
HDLC SERPL-MCNC: 39 MG/DL
LDLC SERPL CALC-MCNC: 89 MG/DL
NONHDLC SERPL-MCNC: 131 MG/DL
POTASSIUM SERPL-SCNC: 3.9 MMOL/L (ref 3.4–5.3)
SODIUM SERPL-SCNC: 136 MMOL/L (ref 133–144)
TRIGL SERPL-MCNC: 208 MG/DL

## 2017-10-21 ASSESSMENT — ASTHMA QUESTIONNAIRES: ACT_TOTALSCORE: 25

## 2017-10-22 NOTE — PROGRESS NOTES
"Your cholesterol is abnormal, please use the recommendations below and recheck labs in 6-12 months.    Ways to improve your cholesterol...    1- Eats less saturated fats (including avoiding \"trans\" fats).    2 - Eat more unsaturated fats  - found in vegetables, grains, and tree nuts.   Also by replacing butter with canola oil or olive oil.    3 - Eat more nuts.   1-2 ounces (a small handful) of almonds, walnuts, hazelnuts or pecans once a  day in place of other less healthy snacks.    4 - Eat more high fiber foods - vegetables and whole grains including oat bran, oats, beans, peas, and flax seed.    5 - Eat more fish - such as salmon, tuna, mackerel, and sardines.  1 or 2 six ounce servings per week is a healthy replacement for other proteins.    6 - Exercise for at least 120 minutes per week - which is equal to 30 minutes 4 days per week.    Lorenzo Brown D.O.  "

## 2017-10-23 NOTE — TELEPHONE ENCOUNTER
Form completed with lab results and faxed to [employer] at number on form.  Abstracted into chart.    Isabel MCDOWELL, Certified Medical Assistant (AAMA)October 23, 2017 12:37 PM

## 2017-10-30 ENCOUNTER — PRENATAL OFFICE VISIT (OUTPATIENT)
Dept: NURSING | Facility: CLINIC | Age: 32
End: 2017-10-30
Payer: COMMERCIAL

## 2017-10-30 VITALS
SYSTOLIC BLOOD PRESSURE: 132 MMHG | WEIGHT: 222.2 LBS | BODY MASS INDEX: 43.62 KG/M2 | HEART RATE: 101 BPM | DIASTOLIC BLOOD PRESSURE: 87 MMHG | HEIGHT: 60 IN

## 2017-10-30 DIAGNOSIS — Z34.81 ENCOUNTER FOR SUPERVISION OF OTHER NORMAL PREGNANCY IN FIRST TRIMESTER: Primary | ICD-10-CM

## 2017-10-30 LAB
ABO + RH BLD: NORMAL
ABO + RH BLD: NORMAL
ALBUMIN UR-MCNC: NEGATIVE MG/DL
APPEARANCE UR: CLEAR
BACTERIA #/AREA URNS HPF: ABNORMAL /HPF
BILIRUB UR QL STRIP: NEGATIVE
BLD GP AB SCN SERPL QL: NORMAL
BLOOD BANK CMNT PATIENT-IMP: NORMAL
COLOR UR AUTO: YELLOW
ERYTHROCYTE [DISTWIDTH] IN BLOOD BY AUTOMATED COUNT: 14.4 % (ref 10–15)
GLUCOSE UR STRIP-MCNC: NEGATIVE MG/DL
HBV SURFACE AG SERPL QL IA: NONREACTIVE
HCT VFR BLD AUTO: 38.5 % (ref 35–47)
HGB BLD-MCNC: 12.9 G/DL (ref 11.7–15.7)
HGB UR QL STRIP: NEGATIVE
HIV 1+2 AB+HIV1 P24 AG SERPL QL IA: NONREACTIVE
KETONES UR STRIP-MCNC: NEGATIVE MG/DL
LEUKOCYTE ESTERASE UR QL STRIP: ABNORMAL
MCH RBC QN AUTO: 30.2 PG (ref 26.5–33)
MCHC RBC AUTO-ENTMCNC: 33.5 G/DL (ref 31.5–36.5)
MCV RBC AUTO: 90 FL (ref 78–100)
NITRATE UR QL: NEGATIVE
NON-SQ EPI CELLS #/AREA URNS LPF: ABNORMAL /LPF
PH UR STRIP: 6 PH (ref 5–7)
PLATELET # BLD AUTO: 346 10E9/L (ref 150–450)
RBC # BLD AUTO: 4.27 10E12/L (ref 3.8–5.2)
RBC #/AREA URNS AUTO: ABNORMAL /HPF
SOURCE: ABNORMAL
SP GR UR STRIP: 1.02 (ref 1–1.03)
SPECIMEN EXP DATE BLD: NORMAL
UROBILINOGEN UR STRIP-ACNC: 0.2 EU/DL (ref 0.2–1)
WBC # BLD AUTO: 14.1 10E9/L (ref 4–11)
WBC #/AREA URNS AUTO: ABNORMAL /HPF

## 2017-10-30 PROCEDURE — 87088 URINE BACTERIA CULTURE: CPT | Performed by: OBSTETRICS & GYNECOLOGY

## 2017-10-30 PROCEDURE — 99207 ZZC NO CHARGE NURSE ONLY: CPT

## 2017-10-30 PROCEDURE — 36415 COLL VENOUS BLD VENIPUNCTURE: CPT | Performed by: OBSTETRICS & GYNECOLOGY

## 2017-10-30 PROCEDURE — 86780 TREPONEMA PALLIDUM: CPT | Performed by: OBSTETRICS & GYNECOLOGY

## 2017-10-30 PROCEDURE — 85027 COMPLETE CBC AUTOMATED: CPT | Performed by: OBSTETRICS & GYNECOLOGY

## 2017-10-30 PROCEDURE — 86762 RUBELLA ANTIBODY: CPT | Performed by: OBSTETRICS & GYNECOLOGY

## 2017-10-30 PROCEDURE — 86900 BLOOD TYPING SEROLOGIC ABO: CPT | Performed by: OBSTETRICS & GYNECOLOGY

## 2017-10-30 PROCEDURE — 86901 BLOOD TYPING SEROLOGIC RH(D): CPT | Performed by: OBSTETRICS & GYNECOLOGY

## 2017-10-30 PROCEDURE — 87086 URINE CULTURE/COLONY COUNT: CPT | Performed by: OBSTETRICS & GYNECOLOGY

## 2017-10-30 PROCEDURE — 87340 HEPATITIS B SURFACE AG IA: CPT | Performed by: OBSTETRICS & GYNECOLOGY

## 2017-10-30 PROCEDURE — 86850 RBC ANTIBODY SCREEN: CPT | Performed by: OBSTETRICS & GYNECOLOGY

## 2017-10-30 PROCEDURE — 87389 HIV-1 AG W/HIV-1&-2 AB AG IA: CPT | Performed by: OBSTETRICS & GYNECOLOGY

## 2017-10-30 PROCEDURE — 81001 URINALYSIS AUTO W/SCOPE: CPT | Performed by: OBSTETRICS & GYNECOLOGY

## 2017-10-30 NOTE — MR AVS SNAPSHOT
After Visit Summary   10/30/2017    Ruby Morrison    MRN: [id number]           Patient Information     Date Of Birth          1985        Visit Information        Provider Department      10/30/2017 10:00 AM RD OB NURSE EDUCATION [facility]        Today's Diagnoses     Encounter for supervision of other normal pregnancy in first trimester    -  1       Follow-ups after your visit        Your next 10 appointments already scheduled     Nov 10, 2017  8:45 AM CST   ESTABLISHED PRENATAL with Tomasa Bernal MD   [facility] ([facility])    [address] [place] [id number] [phone]              Who to contact     If you have questions or need follow up information about today's clinic visit or your schedule please contact [facility] directly at [phone].  Normal or non-critical lab and imaging results will be communicated to you by MyChart, letter or phone within 4 business days after the clinic has received the results. If you do not hear from us within 7 days, please contact the clinic through [employer]art or phone. If you have a critical or abnormal lab result, we will notify you by phone as soon as possible.  Submit refill requests through Fashfix or call your pharmacy and they will forward the refill request to us. Please allow 3 business days for your refill to be completed.          Additional Information About Your Visit        MyChart Information     [employer] gives you secure access to your electronic health record. If you see a primary care provider, you can also send messages to your care team and make appointments. If you have questions, please call your primary care clinic.  If you do not have a primary care provider, please call [phone] and they will assist you.        Care EveryWhere ID     This is your Care EveryWhere ID. This could be used by other organizations to access your  [place] medical records  [id number]        Your Vitals Were     Pulse Height BMI (Body Mass Index)             101 5' (1.524 m) 43.4 kg/m2          Blood Pressure from Last 3 Encounters:   10/30/17 132/87   10/20/17 125/84   05/20/17 (!) 127/92    Weight from Last 3 Encounters:   10/30/17 222 lb 3.2 oz (100.8 kg)   10/20/17 221 lb (100.2 kg)   05/20/17 226 lb (102.5 kg)              We Performed the Following     ABO/RH Type and Screen     Anti Treponema     CBC with Platelets     Hepatitis B surface antigen     HIV Antigen Antibody Combo     Rubella Antibody IgG Quantitative     UA  Macroscopoic with Reflex to Micro     Urine Culture Aerobic Bacterial     Urine Microscopic        Primary Care Provider Office Phone # Fax #    Mesha John  [phone]       [address] [facility] [address]        Equal Access to Services     BRIGID HWANG : Hadii aad ku hadasho Soomaali, waaxda luqadaha, qaybta kaalmada adeegyada, waxay afshinin hayrenen cricket rollins . So [facility] [phone].    ATENCIÓN: Si habla español, tiene a mendez disposición servicios gratuitos de asistencia lingüística. Llame al [phone].    We comply with applicable federal civil rights laws and [place] laws. We do not discriminate on the basis of race, color, national origin, age, disability, sex, sexual orientation, or gender identity.            Thank you!     Thank you for choosing [facility]  for your care. Our goal is always to provide you with excellent care. Hearing back from our patients is one way we can continue to improve our services. Please take a few minutes to complete the written survey that you may receive in the mail after your visit with us. Thank you!             Your Updated Medication List - Protect others around you: Learn how to safely use, store and throw away your medicines at www.disposemymeds.org.          This list is accurate as of: 10/30/17 12:20 PM.  Always use your most  recent med list.                   Brand Name Dispense Instructions for use Diagnosis    albuterol 108 (90 BASE) MCG/ACT Inhaler    PROAIR HFA/PROVENTIL HFA/VENTOLIN HFA    1 Inhaler    Inhale 2 puffs into the lungs every 4 hours as needed for shortness of breath / dyspnea    Mild persistent asthma without complication       diclofenac 1 % Gel topical gel    VOLTAREN    100 g    Apply  2 grams to wrist area up to four times daily as needed using enclosed dosing card.    Radial styloid tenosynovitis, Right wrist pain       montelukast 10 MG tablet    SINGULAIR    90 tablet    TAKE 1 TABLET BY MOUTH EVERY NIGHT AT BEDTIME    Mild persistent asthma without complication       order for DME     1 each    Equipment being ordered: Wrist brace    Radial styloid tenosynovitis of right hand       PRENATAL VITAMIN PO           VITAMIN D (CHOLECALCIFEROL) PO      Take 1,000 Units by mouth daily

## 2017-10-30 NOTE — PROGRESS NOTES
Pt here for new OB intake and labs.  Seeing Dr. Bernal 11/10/17 for new prenatal.  Pregnant with assistance from Dr. Oh.  Did timed intercourse and date of conception is 9/11/17.  Has had two confirmatory u/s at her clinic before being released for prenatal care to [facility].  Pt declines 1st trimester screening.  Discussed all new ob handouts.  Pt has no questions at this time.  Labs pending.  Janet Dickerson RN      Prenatal OB Questionnaire  Past Medical History  Diabetes   No  Hypertension   No  Heart Disease, mitral valve prolapse, or rheumatic fever?   No  An autoimmune disorder such as Lupus or Rheumatoid Arthritis?   No  Kidney Disease or Urinary Tract Infection?   No  Epilepsy, seizures or spells?   No  Migraine headaches?   Yes, ffrequently during pregnancy  A stroke or loss of function or sensation?   No  Any other neurological problems?   No  Have you ever been treated for depression?  No  Are you having problems with crying spells or loss of self-esteem?   No  Have you ever required psychiatric care?   No  Have you ever hepatitis, liver disease or jaundice?   No  Have you ever been treated for blood clots in your veins, deep venous thrombosis, inflammation in the veins, thrombosis, phlebitis, pulmonary embolism or varicosities?   No  Have you had excessive bleeding after surgery or dental work?   No  Do you bleed more than other women after a cut or scratch?   No  Do you have a history of anemia?   No  Have you ever been treated for thyroid problems or taken thyroid medication?  No  Do you have any other endocrine problems?  No  Have you ever been in a major accident or suffered serious trauma?   No  Within the last year, has anyone hit slapped, kicked or otherwise hurt you?  No  In the last year, has anyone forced you to have sex when you didn't want to?  No  Have you ever had a blood transfusion?   No  Would you refuse a blood transfusion if a doctor judged it to be medically necessary?   No  If you  answered yes, would you rather die than have a blood transfusion?   No  If you answered yes, is this for Religion reasons?   No  Does anyone in your home smoke?   No  Do you use tobacco products?  No  Do you drink beer, wine, hard liquor?  Rarely, not when pregnant  Do you use any of the following: marijuana, speed, cocaine, heroine, hallucinogens, or other drugs?  No  Is your blood type Rh negative?   No  Have you ever had abnormal antibodies in your blood?   No  Have you ever had asthma?   Yes, daily singulair, occasional albuterol inhaler  Have you ever had tuberculosis?   No  Do you have any allergies to drugs or over-the-counter medications?   No    Allergies as of 10/30/2017:    Allergies as of 10/30/2017     (No Known Allergies)       Do you have any breast problems?   No  Have you ever ?   Yes, for 4 months with first child.  Daughter started refusing so she weaned.  Have you had any gynecological surgical procedures such as cervical conization, a LEEP procedure, laser treatment, cryosurgery of the cervix, or a dilation and curettage, etc?  No  Have you had any other surgical procedures?  No  Have you been hospitalized for a nonsurgical reason excluding normal delivery?   No  Have you ever had any anesthetic complications?   No  Have you ever had an abnormal pap smear?   No  Do you have a history of abnormalities of the uterus?   No  Did it take you more than one year to become pregnant?   No  Have you ever been evaluated or treated for infertility?   No  Is there a history of medical problems in your family, which you feel might adversely affect your health or pregnancy?   No  Do you have any other problems we have not asked you about which you feel may be important to this pregnancy?  No    Symptoms since Last Menstrual Period  Do you have any of the following:    *abdominal pain  No  *blood in stool or urine  No  *chest pain  No  *shortness of breath  No  *coughing or vomiting up blood  No  *heart racing or skipping beats  No  *nausea and vomiting  No  *pain with urination  No  *vaginal discharge or bleeding  No  Current medications are:  Current Outpatient Prescriptions   Medication Sig Dispense Refill     Prenatal Vit-Fe Fumarate-FA (PRENATAL VITAMIN PO)        montelukast (SINGULAIR) 10 MG tablet TAKE 1 TABLET BY MOUTH EVERY NIGHT AT BEDTIME 90 tablet 0     diclofenac (VOLTAREN) 1 % GEL topical gel Apply  2 grams to wrist area up to four times daily as needed using enclosed dosing card. 100 g 1     order for DME Equipment being ordered: Wrist brace 1 each 0     albuterol (PROAIR HFA, PROVENTIL HFA, VENTOLIN HFA) 108 (90 BASE) MCG/ACT inhaler Inhale 2 puffs into the lungs every 4 hours as needed for shortness of breath / dyspnea 1 Inhaler 1     VITAMIN D, CHOLECALCIFEROL, PO Take 1,000 Units by mouth daily         Genetic Screening  At the time of birth, will you be 35 years old or older?  No  Has the patient, baby s father, or anyone in either family had:  Thalassemia (Italian, Greek, Mediterranean, or  background only) and an MCV result less than 80?  No  Neural tube defect such as meningomyelocele, spina bifida or anencephaly?  No  Congenital heart defect?  No  Down s syndrome?  No  Ethan-Sach s disease (Mormonism, Cajun, French-Bollinger)?  No  Sickle cell disease or trait (Jane)?  No  Hemophilia or other inherited problems of blood coagulation? No  Muscular dystrophy?  No  Cystic Fibrosis?  No  [place] s chorea?  No  Mental retardation/autism? No   If yes, was the person tested for fragile X?  No  Any other inherited genetic or chromosomal disorder?  No  Maternal metabolic disorder (e.g. insulin-dependent diabetes, PKU)? No  A child with birth defects not listed above?  No  Recurrent pregnancy loss or a stillbirth?  No  Has the patient had any medications/street drugs/alcohol since her last menstrual period? No  Does the patient or baby s father have any other genetic risks?   No  Infection History  Do you object to being tested for Hepatitis B? No  Do you object to being tested for HIV? No  Do you feel that you are at high risk for coming in contact with the AIDS virus?  No  Have you ever been treated for tuberculosis?  No  Have you ever received the BCG vaccine for tuberculosis?  No  Have you ever had a positive skin test for tuberculosis? No  Do you live with someone who has tuberculosis?  No  Have you ever been exposed to tuberculosis?  No  Do you have genital herpes?  No  Does your partner have genital herpes?  No  Have you had a rash or viral illness since your last period?  No  Have you ever had Gonorrhea, Chlamydia, Syphilis, venereal warts, trichomoniasis, pelvic inflammatory disease or any other sexually transmitted disease?  No  Do you know if you are a genital group B streptococcus carrier? No  You have not had chicken pox/varicella  No  Have you been vaccinated against chicken pox?  No  Have you had any other infectious disease? No        Early ultrasound screening tool:    Does patient have irregular periods?  Yes, due to PCOS  Did patient use hormonal birth control in the three months prior to positive urine pregnancy test? No  Is the patient breastfeeding?  No  Is the patient 10 weeks or greater at time of education visit?  No      Caffeine intake/servings daily - soda 1/day  Calcium intake/servings daily - 2/day, cheese, yogurt  Exercise none times weekly  Sunscreen used - Yes  Seatbelts used - Yes  Guns stored in the home - Yes, locked in a cabinet  Self Breast Exam - No  Pap test up to date -  Yes  Eye exam up to date -  Yes  Dental exam up to date -  Yes  DEXA scan up to date -  No  Flex Sig/Colonoscopy up to date -  No  Mammography up to date -  No  Immunizations reviewed and up to date - Yes  Abuse: Current or Past (Physical, Sexual or Emotional) - No  Do you feel safe in your environment - Yes  Do you cope well with stress - Yes  Do you suffer from insomnia -  Yes  Last updated by: Araseli Dickerson  10/30/2017

## 2017-10-31 LAB
BACTERIA SPEC CULT: ABNORMAL
BACTERIA SPEC CULT: ABNORMAL
RUBV IGG SERPL IA-ACNC: 24 IU/ML
SPECIMEN SOURCE: ABNORMAL
T PALLIDUM IGG+IGM SER QL: NEGATIVE

## 2017-11-01 PROBLEM — O23.41 GROUP B STREPTOCOCCUS URINARY TRACT INFECTION AFFECTING PREGNANCY IN FIRST TRIMESTER: Status: ACTIVE | Noted: 2017-11-01

## 2017-11-01 PROBLEM — B95.1 GROUP B STREPTOCOCCUS URINARY TRACT INFECTION AFFECTING PREGNANCY IN FIRST TRIMESTER: Status: ACTIVE | Noted: 2017-11-01

## 2017-11-01 NOTE — PROGRESS NOTES
Suleiman Beltran,  Congratulations!  Your prenatal labs are normal, except that you have a urinary tract infection with Group B strep. I am going to send antibiotics to your pharmacy to take now, and then you'll get antibiotics during labor.  I look forward to seeing you soon.  Dr. Bernal

## 2017-11-10 ENCOUNTER — PRENATAL OFFICE VISIT (OUTPATIENT)
Dept: OBGYN | Facility: CLINIC | Age: 32
End: 2017-11-10
Payer: COMMERCIAL

## 2017-11-10 VITALS
SYSTOLIC BLOOD PRESSURE: 126 MMHG | HEART RATE: 91 BPM | WEIGHT: 225.4 LBS | HEIGHT: 60 IN | TEMPERATURE: 98.2 F | BODY MASS INDEX: 44.25 KG/M2 | DIASTOLIC BLOOD PRESSURE: 90 MMHG

## 2017-11-10 DIAGNOSIS — Z34.81 ENCOUNTER FOR SUPERVISION OF OTHER NORMAL PREGNANCY IN FIRST TRIMESTER: Primary | ICD-10-CM

## 2017-11-10 DIAGNOSIS — O23.41 GROUP B STREPTOCOCCUS URINARY TRACT INFECTION AFFECTING PREGNANCY IN FIRST TRIMESTER: ICD-10-CM

## 2017-11-10 DIAGNOSIS — O99.891 BACTERIURIA, ASYMPTOMATIC IN PREGNANCY: ICD-10-CM

## 2017-11-10 DIAGNOSIS — B95.1 GROUP B STREPTOCOCCUS URINARY TRACT INFECTION AFFECTING PREGNANCY IN FIRST TRIMESTER: ICD-10-CM

## 2017-11-10 DIAGNOSIS — R82.71 BACTERIURIA, ASYMPTOMATIC IN PREGNANCY: ICD-10-CM

## 2017-11-10 PROCEDURE — 87086 URINE CULTURE/COLONY COUNT: CPT | Performed by: OBSTETRICS & GYNECOLOGY

## 2017-11-10 PROCEDURE — 87186 SC STD MICRODIL/AGAR DIL: CPT | Performed by: OBSTETRICS & GYNECOLOGY

## 2017-11-10 PROCEDURE — 87088 URINE BACTERIA CULTURE: CPT | Performed by: OBSTETRICS & GYNECOLOGY

## 2017-11-10 PROCEDURE — 99207 ZZC FIRST OB VISIT: CPT | Performed by: OBSTETRICS & GYNECOLOGY

## 2017-11-10 NOTE — NURSING NOTE
Chief Complaint   Patient presents with     Prenatal Care       Initial /90  Pulse 91  Temp 98.2  F (36.8  C) (Oral)  Ht 5' (1.524 m)  Wt 225 lb 6.4 oz (102.2 kg)  BMI 44.02 kg/m2 Estimated body mass index is 44.02 kg/(m^2) as calculated from the following:    Height as of this encounter: 5' (1.524 m).    Weight as of this encounter: 225 lb 6.4 oz (102.2 kg).  BP completed using cuff size: large        The following HM Due: NONE      The following patient reported/Care Every where data was sent to:  P ABSTRACT QUALITY INITIATIVES [[id number]]       patient has appointment for today    Lisa Kaur CMA

## 2017-11-10 NOTE — MR AVS SNAPSHOT
After Visit Summary   11/10/2017    Ruby Morrison    MRN: [id number]           Patient Information     Date Of Birth          1985        Visit Information        Provider Department      11/10/2017 8:45 AM Tomasa Bernal MD [facility]        Today's Diagnoses     Encounter for supervision of other normal pregnancy in first trimester    -  1    Group B Streptococcus urinary tract infection affecting pregnancy in first trimester        Bacteriuria, asymptomatic in pregnancy           Follow-ups after your visit        Who to contact     If you have questions or need follow up information about today's clinic visit or your schedule please contact [facility] directly at [phone].  Normal or non-critical lab and imaging results will be communicated to you by MyChart, letter or phone within 4 business days after the clinic has received the results. If you do not hear from us within 7 days, please contact the clinic through [employer] or phone. If you have a critical or abnormal lab result, we will notify you by phone as soon as possible.  Submit refill requests through [employer] or call your pharmacy and they will forward the refill request to us. Please allow 3 business days for your refill to be completed.          Additional Information About Your Visit        MyChart Information     [employer] gives you secure access to your electronic health record. If you see a primary care provider, you can also send messages to your care team and make appointments. If you have questions, please call your primary care clinic.  If you do not have a primary care provider, please call [phone] and they will assist you.        Care EveryWhere ID     This is your Care EveryWhere ID. This could be used by other organizations to access your [facility] medical records  [id number]        Your Vitals Were     Pulse Temperature Height BMI (Body Mass Index)          91 98.2  F  (36.8  C) (Oral) 5' (1.524 m) 44.02 kg/m2         Blood Pressure from Last 3 Encounters:   11/10/17 126/90   10/30/17 132/87   10/20/17 125/84    Weight from Last 3 Encounters:   11/10/17 225 lb 6.4 oz (102.2 kg)   10/30/17 222 lb 3.2 oz (100.8 kg)   10/20/17 221 lb (100.2 kg)              We Performed the Following     Urine Culture Aerobic Bacterial          Today's Medication Changes          These changes are accurate as of: 11/10/17 11:59 PM.  If you have any questions, ask your nurse or doctor.               Start taking these medicines.        Dose/Directions    cephALEXin 500 MG capsule   Commonly known as:  KEFLEX   Used for:  Bacteriuria, asymptomatic in pregnancy   Started by:  Tomasa Bernal MD        Dose:  500 mg   Take 1 capsule (500 mg) by mouth 3 times daily for 7 days   Quantity:  21 capsule   Refills:  0            Where to get your medicines      These medications were sent to [id number] - [place], [place] - [address] AT [facility] [address]  [address], [place] [address]     Phone:  [phone]     cephALEXin 500 MG capsule                Primary Care Provider Office Phone # Fax #    Mesha John [phone] [address] [facility] [address]        Equal Access to Services     DEEPAK HWANG AH: Hadii ivy orellana hadasho Soomaali, waaxda luqadaha, qaybta kaalmada adeegyada, waxay monique vaca. So [facility] [phone].    ATENCIÓN: Si habla español, tiene a mendez disposición servicios gratuitos de asistencia lingüística. Llame al [phone].    We comply with applicable federal civil rights laws and [place] laws. We do not discriminate on the basis of race, color, national origin, age, disability, sex, sexual orientation, or gender identity.            Thank you!     Thank you for choosing [facility]  for your care. Our goal is always to provide you with excellent care.  Hearing back from our patients is one way we can continue to improve our services. Please take a few minutes to complete the written survey that you may receive in the mail after your visit with us. Thank you!             Your Updated Medication List - Protect others around you: Learn how to safely use, store and throw away your medicines at www.disposemymeds.org.          This list is accurate as of: 11/10/17 11:59 PM.  Always use your most recent med list.                   Brand Name Dispense Instructions for use Diagnosis    albuterol 108 (90 BASE) MCG/ACT Inhaler    PROAIR HFA/PROVENTIL HFA/VENTOLIN HFA    1 Inhaler    Inhale 2 puffs into the lungs every 4 hours as needed for shortness of breath / dyspnea    Mild persistent asthma without complication       cephALEXin 500 MG capsule    KEFLEX    21 capsule    Take 1 capsule (500 mg) by mouth 3 times daily for 7 days    Bacteriuria, asymptomatic in pregnancy       diclofenac 1 % Gel topical gel    VOLTAREN    100 g    Apply  2 grams to wrist area up to four times daily as needed using enclosed dosing card.    Radial styloid tenosynovitis, Right wrist pain       montelukast 10 MG tablet    SINGULAIR    90 tablet    TAKE 1 TABLET BY MOUTH EVERY NIGHT AT BEDTIME    Mild persistent asthma without complication       order for DME     1 each    Equipment being ordered: Wrist brace    Radial styloid tenosynovitis of right hand       PRENATAL VITAMIN PO           VITAMIN D (CHOLECALCIFEROL) PO      Take 1,000 Units by mouth daily

## 2017-11-11 LAB
BACTERIA SPEC CULT: ABNORMAL
BACTERIA SPEC CULT: ABNORMAL
SPECIMEN SOURCE: ABNORMAL

## 2017-11-12 RX ORDER — CEPHALEXIN 500 MG/1
500 CAPSULE ORAL 3 TIMES DAILY
Qty: 21 CAPSULE | Refills: 0 | Status: SHIPPED | OUTPATIENT
Start: 2017-11-12 | End: 2017-11-19

## 2017-11-12 NOTE — PROGRESS NOTES
SUBJECTIVE: Ruby Morrison is a 32 year old   here for initial OB visit.  Doing well. No concerns.  Conceived by IUI with follistim and ovidrel.  Previous pregnancy complicated by PPROM at 36+6 and delivery at 37 weeks. Labor was complicated by chorioamnionitis. Baby was NICU for some time with cooling protocol and sepsis eval. She is now doing well without deficits.   Treated for GBS UTI on prenatal labs.       Past Medical History:   Diagnosis Date     Asthma      Cat allergies      GERD (gastroesophageal reflux disease)        Past Surgical History:   Procedure Laterality Date     HYMENOTOMY         Family History   Problem Relation Age of Onset     Migraines Mother      Hypertension Father      Lipids Father      DIABETES Maternal Grandmother      DIABETES Paternal Grandmother      CANCER Paternal Grandfather      luekemia       Social History     Social History     Marital status:      Spouse name: N/A     Number of children: N/A     Years of education: N/A     Occupational History     Not on file.     Social History Main Topics     Smoking status: Never Smoker     Smokeless tobacco: Never Used     Alcohol use No     Drug use: No     Sexual activity: Yes     Partners: Male     Birth control/ protection:      Other Topics Concern     Parent/Sibling W/ Cabg, Mi Or Angioplasty Before 65f 55m? No     Social History Narrative    Caffeine intake/servings daily - soda 1/day    Calcium intake/servings daily - 2/day, cheese, yogurt    Exercise none times weekly    Sunscreen used - Yes    Seatbelts used - Yes    Guns stored in the home - Yes, locked in a cabinet    Self Breast Exam - No    Pap test up to date -  Yes    Eye exam up to date -  Yes    Dental exam up to date -  Yes    DEXA scan up to date -  No    Flex Sig/Colonoscopy up to date -  No    Mammography up to date -  No    Immunizations reviewed and up to date - Yes    Abuse: Current or Past (Physical, Sexual or Emotional) - No    Do you feel  safe in your environment - Yes    Do you cope well with stress - Yes    Do you suffer from insomnia - No    Last updated by: Araseli Dickerson  10/30/2017             Current Outpatient Prescriptions:      Prenatal Vit-Fe Fumarate-FA (PRENATAL VITAMIN PO), , Disp: , Rfl:      montelukast (SINGULAIR) 10 MG tablet, TAKE 1 TABLET BY MOUTH EVERY NIGHT AT BEDTIME, Disp: 90 tablet, Rfl: 0     diclofenac (VOLTAREN) 1 % GEL topical gel, Apply  2 grams to wrist area up to four times daily as needed using enclosed dosing card., Disp: 100 g, Rfl: 1     order for DME, Equipment being ordered: Wrist brace, Disp: 1 each, Rfl: 0     albuterol (PROAIR HFA, PROVENTIL HFA, VENTOLIN HFA) 108 (90 BASE) MCG/ACT inhaler, Inhale 2 puffs into the lungs every 4 hours as needed for shortness of breath / dyspnea, Disp: 1 Inhaler, Rfl: 1     VITAMIN D, CHOLECALCIFEROL, PO, Take 1,000 Units by mouth daily, Disp: , Rfl:     No Known Allergies      Past Medical History of Father of Baby: No significant medical history    Review of Systems:   Constitutional, HEENT, cardiovascular, pulmonary, gi and gu systems are negative, except as otherwise noted.       History Since Last Menstrual Period: No Problems    EXAM: Vitals:    11/10/17 0910   BP: 126/90   Pulse: 91   Temp: 98.2  F (36.8  C)   TempSrc: Oral   Weight: 225 lb 6.4 oz (102.2 kg)   Height: 5' (1.524 m)     GENERAL APPEARANCE: healthy, alert and no distress  EYES: EOMI,  PERRL  HENT: Nose and mouth without ulcers or lesions  NECK: no adenopathy, no asymmetry, masses, or scars and thyroid normal to palpation  RESP: lungs clear to auscultation - no rales, rhonchi or wheezes  BREAST: normal without masses, tenderness or nipple discharge and no palpable axillary masses or adenopathy  CV: regular rates and rhythm, normal S1 S2, no S3 or S4 and no murmur, click or rub -  ABDOMEN:  soft, nontender, no HSM or masses and bowel sounds normal  : normal cervix, adnexae, and uterus without masses or  discharge  MS: extremities normal- no gross deformities noted, no evidence of inflammation in joints, FROM in all extremities.  SKIN: no suspicious lesions or rashes  NEURO: Normal strength and tone, sensory exam grossly normal, mentation intact and speech normal  PSYCH: mentation appears normal and affect normal/bright  LYMPHATICS: No axillary, cervical, inguinal, or supraclavicular nodes    [facility] exam:  Perineum: Intact;   Vulva: Normal;  Vagina: Normal mucosa, no discharge,   Cervix: Parous, closed, mobile, no discharge;  Uterus: 11 weeks, Normal shape, position and consistency and Nontender;   Adnexa: Not palpable;  Anus: Normal without lesion or mass;   Bony Pelvis: Adequate.     Ultrasound: informal for heart tones. Positive cardiac activity.     ASSESSMENT/ PLAN:  Ruby Morrison is a 32 year old   at 10 weeks 4 days by IUI dating.  Follow up in 4 weeks.  Normal exercise.  Normal sexual activity.  Prenatal vitamins.  Anticipated weight gain:  BMI >30: 11-20 pounds  Flu shot today  TDaP 27-36 weeks  Oriented to practice, PNC  Discussed aneuploidy screening, plans quad screen.  Recheck urine culture today.   Penicillin in labor.   Discussed events of prior delivery. Patient inquired if she should have  delivery this time. Discussed that mode of delivery did not likely impact outcome. Discussed unlikely to recur.  will be an option in labor if fetal heart rate tracing is becoming concerning. Will continue to discuss as pregnancy progresses.

## 2017-12-12 ENCOUNTER — PRENATAL OFFICE VISIT (OUTPATIENT)
Dept: OBGYN | Facility: CLINIC | Age: 32
End: 2017-12-12
Payer: COMMERCIAL

## 2017-12-12 VITALS
HEART RATE: 103 BPM | BODY MASS INDEX: 45.02 KG/M2 | SYSTOLIC BLOOD PRESSURE: 143 MMHG | WEIGHT: 230.5 LBS | DIASTOLIC BLOOD PRESSURE: 89 MMHG

## 2017-12-12 DIAGNOSIS — O16.2 HYPERTENSION AFFECTING PREGNANCY IN SECOND TRIMESTER: ICD-10-CM

## 2017-12-12 DIAGNOSIS — B95.1 GROUP B STREPTOCOCCUS URINARY TRACT INFECTION AFFECTING PREGNANCY IN FIRST TRIMESTER: ICD-10-CM

## 2017-12-12 DIAGNOSIS — Z34.83 ENCOUNTER FOR SUPERVISION OF OTHER NORMAL PREGNANCY IN THIRD TRIMESTER: Primary | ICD-10-CM

## 2017-12-12 DIAGNOSIS — R03.0 ELEVATED BLOOD PRESSURE READING WITHOUT DIAGNOSIS OF HYPERTENSION: ICD-10-CM

## 2017-12-12 DIAGNOSIS — O23.41 GROUP B STREPTOCOCCUS URINARY TRACT INFECTION AFFECTING PREGNANCY IN FIRST TRIMESTER: ICD-10-CM

## 2017-12-12 PROCEDURE — 80053 COMPREHEN METABOLIC PANEL: CPT | Performed by: OBSTETRICS & GYNECOLOGY

## 2017-12-12 PROCEDURE — 87088 URINE BACTERIA CULTURE: CPT | Performed by: OBSTETRICS & GYNECOLOGY

## 2017-12-12 PROCEDURE — 84156 ASSAY OF PROTEIN URINE: CPT | Performed by: OBSTETRICS & GYNECOLOGY

## 2017-12-12 PROCEDURE — 99000 SPECIMEN HANDLING OFFICE-LAB: CPT | Performed by: OBSTETRICS & GYNECOLOGY

## 2017-12-12 PROCEDURE — 81511 FTL CGEN ABNOR FOUR ANAL: CPT | Mod: 90 | Performed by: OBSTETRICS & GYNECOLOGY

## 2017-12-12 PROCEDURE — 36415 COLL VENOUS BLD VENIPUNCTURE: CPT | Performed by: OBSTETRICS & GYNECOLOGY

## 2017-12-12 PROCEDURE — 87086 URINE CULTURE/COLONY COUNT: CPT | Performed by: OBSTETRICS & GYNECOLOGY

## 2017-12-12 PROCEDURE — 99207 ZZC PRENATAL VISIT: CPT | Performed by: OBSTETRICS & GYNECOLOGY

## 2017-12-12 NOTE — PROGRESS NOTES
Doing ok   BP elevated today, same on recheck. Will get baseline labs. Fetal survey ordered through [facility].  Quad today.  Answered routine questions. Will work on exercise.   RTC 4 weeks

## 2017-12-12 NOTE — MR AVS SNAPSHOT
After Visit Summary   12/12/2017    Ruby Morrison    MRN: [id number]           Patient Information     Date Of Birth          1985        Visit Information        Provider Department      12/12/2017 5:30 PM Tomasa Bernal MD [facility]        Today's Diagnoses     Encounter for supervision of other normal pregnancy in third trimester    -  1    Group B Streptococcus urinary tract infection affecting pregnancy in first trimester        Elevated blood pressure reading without diagnosis of hypertension        Hypertension affecting pregnancy in second trimester           Follow-ups after your visit        Additional Services     MAT FETAL MED CTR REFERRAL-PREGNANCY       >> Patient may proceed with recommendations for further testing as directed by the Maternal Fetal Medicine Specialist >>    >> If requesting Fetal Echo: MFM will determine appropriate location for exam due to indication.    >> If requesting Lung Maturity Amnio:  If results indicate fetal lung maturity, induction or C/S is recommended within 36 hours.  Please schedule accordingly.     Dear Patient:   Please be aware that coverage of these services is subject to the terms and limitations of your health insurance plan.  Call member services at your health plan with any benefit or coverage questions.      Please bring the following to your appointment:    >>  Any x-rays, CTs or MRIs which have been performed.  Contact the facility where they were done to arrange for  prior to your scheduled appointment.  Any new CT, MRI or other procedures ordered by your specialist must be performed at a [facility] facility or coordinated by your clinic's referral office.  >>  List of current medications   >>  This referral request   >>  Any documents/labs given to you for this referral                  Who to contact     If you have questions or need follow up information about today's clinic visit or your schedule  please contact [facility] [place] directly at [phone].  Normal or non-critical lab and imaging results will be communicated to you by MyChart, letter or phone within 4 business days after the clinic has received the results. If you do not hear from us within 7 days, please contact the clinic through Leostreamhart or phone. If you have a critical or abnormal lab result, we will notify you by phone as soon as possible.  Submit refill requests through [employer] or call your pharmacy and they will forward the refill request to us. Please allow 3 business days for your refill to be completed.          Additional Information About Your Visit        LeostreamharMyShape Information     [employer] gives you secure access to your electronic health record. If you see a primary care provider, you can also send messages to your care team and make appointments. If you have questions, please call your primary care clinic.  If you do not have a primary care provider, please call [phone] and they will assist you.        Care EveryWhere ID     This is your Care EveryWhere ID. This could be used by other organizations to access your [facility] medical records  [id number]        Your Vitals Were     Pulse BMI (Body Mass Index)                103 45.02 kg/m2           Blood Pressure from Last 3 Encounters:   12/12/17 143/89   11/10/17 126/90   10/30/17 132/87    Weight from Last 3 Encounters:   12/12/17 230 lb 8 oz (104.6 kg)   11/10/17 225 lb 6.4 oz (102.2 kg)   10/30/17 222 lb 3.2 oz (100.8 kg)              We Performed the Following     Comprehensive metabolic panel (BMP + Alb, Alk Phos, ALT, AST, Total. Bili, TP)     Creatinine random urine     MAT FETAL MED CTR REFERRAL-PREGNANCY     Maternal quad screen     Protein  random urine with Creat Ratio     Urine Culture Aerobic Bacterial        Primary Care Provider Office Phone # Fax #    Mesha John [phone]       [facility] [address]        Equal  Access to Services     [facility]: Hadii aad ku hadalysa Lucero, waaxda luqadaha, qaybta kachayatosin hong. So [facility] [phone].    ATENCIÓN: Si habla español, tiene a mendez disposición servicios gratuitos de asistencia lingüística. Llame al [phone].    We comply with applicable federal civil rights laws and [place] laws. We do not discriminate on the basis of race, color, national origin, age, disability, sex, sexual orientation, or gender identity.            Thank you!     Thank you for choosing [facility]  for your care. Our goal is always to provide you with excellent care. Hearing back from our patients is one way we can continue to improve our services. Please take a few minutes to complete the written survey that you may receive in the mail after your visit with us. Thank you!             Your Updated Medication List - Protect others around you: Learn how to safely use, store and throw away your medicines at www.disposemymeds.org.          This list is accurate as of: 12/12/17  5:58 PM.  Always use your most recent med list.                   Brand Name Dispense Instructions for use Diagnosis    albuterol 108 (90 BASE) MCG/ACT Inhaler    PROAIR HFA/PROVENTIL HFA/VENTOLIN HFA    1 Inhaler    Inhale 2 puffs into the lungs every 4 hours as needed for shortness of breath / dyspnea    Mild persistent asthma without complication       diclofenac 1 % Gel topical gel    VOLTAREN    100 g    Apply  2 grams to wrist area up to four times daily as needed using enclosed dosing card.    Radial styloid tenosynovitis, Right wrist pain       montelukast 10 MG tablet    SINGULAIR    90 tablet    TAKE 1 TABLET BY MOUTH EVERY NIGHT AT BEDTIME    Mild persistent asthma without complication       order for DME     1 each    Equipment being ordered: Wrist brace    Radial styloid tenosynovitis of right hand       PRENATAL VITAMIN PO           VITAMIN D  (CHOLECALCIFEROL) PO      Take 1,000 Units by mouth daily

## 2017-12-13 LAB
ALBUMIN SERPL-MCNC: 2.7 G/DL (ref 3.4–5)
ALP SERPL-CCNC: 76 U/L (ref 40–150)
ALT SERPL W P-5'-P-CCNC: 21 U/L (ref 0–50)
ANION GAP SERPL CALCULATED.3IONS-SCNC: 11 MMOL/L (ref 3–14)
AST SERPL W P-5'-P-CCNC: 9 U/L (ref 0–45)
BILIRUB SERPL-MCNC: 0.2 MG/DL (ref 0.2–1.3)
BUN SERPL-MCNC: 6 MG/DL (ref 7–30)
CALCIUM SERPL-MCNC: 8.6 MG/DL (ref 8.5–10.1)
CHLORIDE SERPL-SCNC: 103 MMOL/L (ref 94–109)
CO2 SERPL-SCNC: 24 MMOL/L (ref 20–32)
CREAT SERPL-MCNC: 0.45 MG/DL (ref 0.52–1.04)
CREAT UR-MCNC: 28 MG/DL
GFR SERPL CREATININE-BSD FRML MDRD: >90 ML/MIN/1.7M2
GLUCOSE SERPL-MCNC: 86 MG/DL (ref 70–99)
POTASSIUM SERPL-SCNC: 3.7 MMOL/L (ref 3.4–5.3)
PROT SERPL-MCNC: 6.9 G/DL (ref 6.8–8.8)
PROT UR-MCNC: 0.06 G/L
PROT/CREAT 24H UR: 0.2 G/G CR (ref 0–0.2)
SODIUM SERPL-SCNC: 138 MMOL/L (ref 133–144)

## 2017-12-14 LAB
BACTERIA SPEC CULT: ABNORMAL
BACTERIA SPEC CULT: ABNORMAL
SPECIMEN SOURCE: ABNORMAL

## 2017-12-15 LAB
# FETUSES US: NORMAL
AFP ADJ MOM AMN: 0.86
AFP SERPL-MCNC: 18 NG/ML
AGE - REPORTED: 30.7 YR
DATING METHOD: NORMAL
DIABETIC AT CONCEPTION: NO
FAMILY MEMBER DISEASES HX: NO
FAMILY MEMBER DISEASES HX: NO
GA METHOD: NORMAL
GA: 15.14 WEEKS
HCG MOM SERPL: 1.75
HCG SERPL-ACNC: NORMAL IU/L
HX OF HEREDITARY DISORDERS: NO
IDDM PATIENT QL: NO
INHIBIN A MOM SERPL: 1.51
INHIBIN A SERPL-MCNC: 236 PG/ML
INTEGRATED SCN PATIENT-IMP: NORMAL
LMP START DATE: NORMAL
PATHOLOGY STUDY: NORMAL
PREV HX CHROMOSOME ABNORMALITY: NO
SPECIMEN DRAWN SERPL: NORMAL
TWINS: NORMAL
U ESTRIOL MOM SERPL: 1.21
U ESTRIOL SERPL-MCNC: 0.69 NG/ML

## 2017-12-18 DIAGNOSIS — J45.30 MILD PERSISTENT ASTHMA WITHOUT COMPLICATION: ICD-10-CM

## 2017-12-21 ENCOUNTER — MYC MEDICAL ADVICE (OUTPATIENT)
Dept: FAMILY MEDICINE | Facility: CLINIC | Age: 32
End: 2017-12-21

## 2017-12-22 RX ORDER — MONTELUKAST SODIUM 10 MG/1
1 TABLET ORAL AT BEDTIME
Qty: 90 TABLET | Refills: 0 | Status: SHIPPED | OUTPATIENT
Start: 2017-12-22 | End: 2018-03-22

## 2017-12-22 NOTE — TELEPHONE ENCOUNTER
Prescription approved per [facility] Refill Protocol.  Jordyn Mark RN

## 2017-12-30 ENCOUNTER — VIRTUAL VISIT (OUTPATIENT)
Dept: FAMILY MEDICINE | Facility: OTHER | Age: 32
End: 2017-12-30

## 2017-12-30 NOTE — PROGRESS NOTES
"Date:   Clinician: Susanne Yang  Clinician NPI: [id number]  Patient: Ruby Morrison  Patient : 1985  Patient Address: [address] [place] [address]  Patient Phone: [phone]  Visit Protocol: Eye conditions  Patient Summary:  Ruby is a 32 year old (: 1985 ) female who initiated a Visit for conjunctivitis.  When asked the question \"Please sign me up to receive news, health information and promotions from [employer].\", Ruby responded \"No\".   A synchronous visit is necessary because the patient reported the following abnormal symptoms:   Recent episode of conjunctivitis (requires clarification)   Images of her eye condition were uploaded.   Her symptoms started today and affect both eyes. The symptoms consist of eyelid swelling, drainage coming from the eye(s), itchy eye(s), and eye redness.   Symptom details     Drainage: The color of the drainage coming out of her eye(s) is yellow. The drainage is thick and causes her eyelids to be stuck shut in the morning.    Itchiness: Ruby does not have seasonal allergies or hay fever.     Denied symptoms include eye pain, bumps on the eyelid, and light sensitivity. Ruby has not experienced a decrease in vision and does not have subconjunctival hemorrhage. She does not feel feverish.   Ruby wears contact lenses. She has not slept in them in the past week. In the past 2 weeks, she has had a cold or an ear infection.   Ruby has not been exposed to someone with a red eye or an eye infection. She also has not had a recent eye injury, foreign body in the eye(s), and eye surgery. Ruby has not ever been diagnosed with glaucoma.   Ruby is not taking medication to treat her current symptoms.   Ruby does not require proof of evaluation of her eye condition before returning to school, work, or .   Ruby does not smoke or use smokeless tobacco.   She is pregnant and denies breastfeeding.   Additional information as reported by the " "patient (free text): I take singular for my asthma.   MEDICATIONS:  Acetaminophen (Tylenol), albuterol inhaler/neb (Ventolin, Proventil, Volmask, Ventodisk), Aspirin, and guaifenesin (Robitussin, Mucinex)  , ALLERGIES:  NKDA   Clinician Response:  Dear Ruby,  Based on the information provided, you most likely have bacterial conjunctivitis, more commonly called pink eye.  I am prescribing:  Ciprofloxacin (Ciloxan) 0.3% ophthalmic solution. Apply 1-2 drops into the affected eye(s) 4 times a day for 5-7 days.  The medication I prescribed is an antibiotic medication. Infections can be caused by either bacteria or a virus, and often have similar symptoms, so it is possible that this is a viral infection. Antibiotics are only effective against bacterial infections, so when it is caused by a virus, the medication will not help symptoms improve or make it less contagious.  To reduce the spread of the eye infection, you should not wear contacts or use eye makeup until the infection has fully resolved, and be sure to wash your hands at least once per hour and avoid touching the eyes as much as possible.  The following will reduce the risk for future eye infections:     Frequent handwashing    Replace towels and washcloths daily    Do not share towels and washcloths with others    Replace contacts and cases used while eyes were infected    Do not sleep in contacts that are not FDA-approved for overnight wear    Do not reuse or \"top off\" contact solution    Replace eye makeup used while eyes were infected    Do not use anyone else's eye makeup     Follow up with your provider if you are taking your medication as directed and do not see any improvements after 2 days. Be seen earlier if you develop any new or worsening symptoms.   Diagnosis: Bacterial conjunctivitis  Diagnosis ICD: H10.9  Triage Notes: No recurrent pink eye  Synchronous Triage: phone, status: completed, duration: 94 seconds  Prescription: ciprofloxacin (Ciloxan) " 0.3% ophthalmic solution 5 ml, 7 days supply. Apply 1-2 drops into the affected eye(s) 4 times a day for 5-7 days. Refills: 0, Refill as needed: no, Allow substitutions: yes  Pharmacy: [id number] - [phone] - [address], [place] [address]

## 2018-01-09 ENCOUNTER — PRE VISIT (OUTPATIENT)
Dept: MATERNAL FETAL MEDICINE | Facility: CLINIC | Age: 33
End: 2018-01-09

## 2018-01-11 ENCOUNTER — OFFICE VISIT (OUTPATIENT)
Dept: MATERNAL FETAL MEDICINE | Facility: CLINIC | Age: 33
End: 2018-01-11
Attending: OBSTETRICS & GYNECOLOGY
Payer: COMMERCIAL

## 2018-01-11 ENCOUNTER — HOSPITAL ENCOUNTER (OUTPATIENT)
Dept: ULTRASOUND IMAGING | Facility: CLINIC | Age: 33
Discharge: HOME | End: 2018-01-11
Attending: OBSTETRICS & GYNECOLOGY | Admitting: OBSTETRICS & GYNECOLOGY
Payer: COMMERCIAL

## 2018-01-11 DIAGNOSIS — O10.919 CHRONIC HYPERTENSION IN PREGNANCY: Primary | ICD-10-CM

## 2018-01-11 DIAGNOSIS — O26.90 PREGNANCY RELATED CONDITION, UNSPECIFIED TRIMESTER: ICD-10-CM

## 2018-01-11 DIAGNOSIS — Z36.3 SCREENING, ANTENATAL, FOR MALFORMATION BY ULTRASOUND: ICD-10-CM

## 2018-01-11 DIAGNOSIS — O99.212 MATERNAL OBESITY SYNDROME IN SECOND TRIMESTER: ICD-10-CM

## 2018-01-11 PROCEDURE — 76811 OB US DETAILED SNGL FETUS: CPT

## 2018-01-11 NOTE — MR AVS SNAPSHOT
After Visit Summary   2018    Ruby Morrison    MRN: [id number]           Patient Information     Date Of Birth          1985        Visit Information        Provider Department      2018 11:30 AM Davi Mcconnell MD [facility] Maternal Fetal [facility]        Today's Diagnoses     Chronic hypertension in pregnancy    -  1    Screening, , for malformation by ultrasound        Maternal obesity syndrome in second trimester           Follow-ups after your visit        Your next 10 appointments already scheduled     2018 12:30 PM CST   ESTABLISHED PRENATAL with Tomasa Bernal MD   [facility] ([facility])    [address] [place] [id number] [phone]              Future tests that were ordered for you today     Open Future Orders        Priority Expected Expires Ordered    MFM US Comprehensive Single F/U Routine 2018            Who to contact     If you have questions or need follow up information about today's clinic visit or your schedule please contact [facility]ERNAL FETAL MEDIC[facility] directly at [phone].  Normal or non-critical lab and imaging results will be communicated to you by ImmuVenhart, letter or phone within 4 business days after the clinic has received the results. If you do not hear from us within 7 days, please contact the clinic through ImmuVenhart or phone. If you have a critical or abnormal lab result, we will notify you by phone as soon as possible.  Submit refill requests through [employer] or call your pharmacy and they will forward the refill request to us. Please allow 3 business days for your refill to be completed.          Additional Information About Your Visit        MyChart Information     [employer] gives you secure access to your electronic health record. If you see a primary care provider, you can also send messages to your care team  and make appointments. If you have questions, please call your primary care clinic.  If you do not have a primary care provider, please call [phone] and they will assist you.        Care EveryWhere ID     This is your Care EveryWhere ID. This could be used by other organizations to access your [facility] medical records  [id number]         Blood Pressure from Last 3 Encounters:   12/12/17 143/89   11/10/17 126/90   10/30/17 132/87    Weight from Last 3 Encounters:   12/12/17 104.6 kg (230 lb 8 oz)   11/10/17 102.2 kg (225 lb 6.4 oz)   10/30/17 100.8 kg (222 lb 3.2 oz)               Primary Care Provider Office Phone # Fax #    Mesha John [phone]       [facility] [address]        Equal Access to Services     [facility] : Hadii ivy ku hadasho Soomaali, waaxda luqadaha, qaybta kaalmada adeegyada, tosin amaya hayvictor manuel rollins . So [facility] [phone].    ATENCIÓN: Si habla español, tiene a mendez disposición servicios gratuitos de asistencia lingüística. Llame al [phone].    We comply with applicable federal civil rights laws and [place] laws. We do not discriminate on the basis of race, color, national origin, age, disability, sex, sexual orientation, or gender identity.            Thank you!     Thank you for choosing [facility]  for your care. Our goal is always to provide you with excellent care. Hearing back from our patients is one way we can continue to improve our services. Please take a few minutes to complete the written survey that you may receive in the mail after your visit with us. Thank you!             Your Updated Medication List - Protect others around you: Learn how to safely use, store and throw away your medicines at www.disposemymeds.org.          This list is accurate as of: 1/11/18 12:29 PM.  Always use your most recent med list.                   Brand Name Dispense Instructions for use Diagnosis     albuterol 108 (90 BASE) MCG/ACT Inhaler    PROAIR HFA/PROVENTIL HFA/VENTOLIN HFA    1 Inhaler    Inhale 2 puffs into the lungs every 4 hours as needed for shortness of breath / dyspnea    Mild persistent asthma without complication       diclofenac 1 % Gel topical gel    VOLTAREN    100 g    Apply  2 grams to wrist area up to four times daily as needed using enclosed dosing card.    Radial styloid tenosynovitis, Right wrist pain       montelukast 10 MG tablet    SINGULAIR    90 tablet    Take 1 tablet (10 mg) by mouth At Bedtime    Mild persistent asthma without complication       order for DME     1 each    Equipment being ordered: Wrist brace    Radial styloid tenosynovitis of right hand       PRENATAL VITAMIN PO           VITAMIN D (CHOLECALCIFEROL) PO      Take 1,000 Units by mouth daily

## 2018-01-11 NOTE — PROGRESS NOTES
Please refer to ultrasound report under 'Imaging' Studies of 'Chart Review' tabs.    Davi Mcconnell M.D.

## 2018-01-24 ENCOUNTER — PRENATAL OFFICE VISIT (OUTPATIENT)
Dept: OBGYN | Facility: CLINIC | Age: 33
End: 2018-01-24
Payer: COMMERCIAL

## 2018-01-24 VITALS
HEART RATE: 98 BPM | DIASTOLIC BLOOD PRESSURE: 87 MMHG | TEMPERATURE: 97.5 F | BODY MASS INDEX: 44.59 KG/M2 | SYSTOLIC BLOOD PRESSURE: 132 MMHG | WEIGHT: 228.3 LBS

## 2018-01-24 DIAGNOSIS — Z34.82 ENCOUNTER FOR SUPERVISION OF OTHER NORMAL PREGNANCY IN SECOND TRIMESTER: Primary | ICD-10-CM

## 2018-01-24 PROCEDURE — 99207 ZZC PRENATAL VISIT: CPT | Performed by: OBSTETRICS & GYNECOLOGY

## 2018-01-24 NOTE — MR AVS SNAPSHOT
After Visit Summary   1/24/2018    Ruby Morrison    MRN: [id number]           Patient Information     Date Of Birth          1985        Visit Information        Provider Department      1/24/2018 12:30 PM Tomasa Bernal MD [facility]        Today's Diagnoses     Encounter for supervision of other normal pregnancy in second trimester    -  1       Follow-ups after your visit        Your next 10 appointments already scheduled     Feb 08, 2018 10:15 AM CST   (Arrive by 10:00 AM)   MFM US COMPRE SINGLE F/U with [id number]   [facility] Maternal Fetal Medicine Ultrasound - [facility] ([facility])    [address] [place] [address] [address]  [place] [id number] [phone]           Wear comfortable clothes and leave your valuables at home.            Feb 08, 2018 10:45 AM CST   [facility]   [facility] Maternal Fetal Medicine - [facility] ([facility])    [address] [place] [address] [address]  [facility] [id number] [phone]           Please arrive at the time given for your first appointment. This visit is used internally to schedule the physician's time during your ultrasound.              Who to contact     If you have questions or need follow up information about today's clinic visit or your schedule please contact [facility] directly at [phone].  Normal or non-critical lab and imaging results will be communicated to you by MyChart, letter or phone within 4 business days after the clinic has received the results. If you do not hear from us within 7 days, please contact the clinic through MyChart or phone. If you have a critical or abnormal lab result, we will notify you by phone as soon as possible.  Submit refill requests through [employer] or call your pharmacy and they will forward the refill request to us. Please allow 3 business days for your refill to be completed.          Additional Information About Your  Visit        MyChart Information     TrendB[employer]art gives you secure access to your electronic health record. If you see a primary care provider, you can also send messages to your care team and make appointments. If you have questions, please call your primary care clinic.  If you do not have a primary care provider, please call [phone] and they will assist you.        Care EveryWhere ID     This is your Care EveryWhere ID. This could be used by other organizations to access your [facility] medical records  [id number]        Your Vitals Were     Pulse Temperature BMI (Body Mass Index)             98 97.5  F (36.4  C) (Oral) 44.59 kg/m2          Blood Pressure from Last 3 Encounters:   01/24/18 132/87   12/12/17 143/89   11/10/17 126/90    Weight from Last 3 Encounters:   01/24/18 228 lb 4.8 oz (103.6 kg)   12/12/17 230 lb 8 oz (104.6 kg)   11/10/17 225 lb 6.4 oz (102.2 kg)               Primary Care Provider Office Phone # Fax #    Mesha John  [phone]       [address] [facility] [address]        Equal Access to Services     BRIGID [facility] AH: Hadii aad ku hadasho Soomaali, waaxda luqadaha, qaybta kaalmada adeegyada, tosin alarconn cricket vaca. So [facility] [phone].    ATENCIÓN: Si habla español, tiene a mendez disposición servicios gratuitos de asistencia lingüística. Norma al [phone].    We comply with applicable federal civil rights laws and [place] laws. We do not discriminate on the basis of race, color, national origin, age, disability, sex, sexual orientation, or gender identity.            Thank you!     Thank you for choosing [facility]  for your care. Our goal is always to provide you with excellent care. Hearing back from our patients is one way we can continue to improve our services. Please take a few minutes to complete the written survey that you may receive in the mail after your visit with us. Thank you!             Your Updated Medication  List - Protect others around you: Learn how to safely use, store and throw away your medicines at www.disposemymeds.org.          This list is accurate as of 1/24/18 11:59 PM.  Always use your most recent med list.                   Brand Name Dispense Instructions for use Diagnosis    albuterol 108 (90 BASE) MCG/ACT Inhaler    PROAIR HFA/PROVENTIL HFA/VENTOLIN HFA    1 Inhaler    Inhale 2 puffs into the lungs every 4 hours as needed for shortness of breath / dyspnea    Mild persistent asthma without complication       ASPIRIN PO      Take 81 mg by mouth        diclofenac 1 % Gel topical gel    VOLTAREN    100 g    Apply  2 grams to wrist area up to four times daily as needed using enclosed dosing card.    Radial styloid tenosynovitis, Right wrist pain       montelukast 10 MG tablet    SINGULAIR    90 tablet    Take 1 tablet (10 mg) by mouth At Bedtime    Mild persistent asthma without complication       order for DME     1 each    Equipment being ordered: Wrist brace    Radial styloid tenosynovitis of right hand       PRENATAL VITAMIN PO           VITAMIN D (CHOLECALCIFEROL) PO      Take 1,000 Units by mouth daily

## 2018-01-27 NOTE — PROGRESS NOTES
Doing well. Good fetal movement.   Had fetal survey normal. Has growth US scheduled for HTN.  BPs currently at goal.   RTC 4 weeks. GCT then.

## 2018-02-08 ENCOUNTER — HOSPITAL ENCOUNTER (OUTPATIENT)
Dept: ULTRASOUND IMAGING | Facility: CLINIC | Age: 33
Discharge: HOME | End: 2018-02-08
Attending: OBSTETRICS & GYNECOLOGY | Admitting: OBSTETRICS & GYNECOLOGY
Payer: COMMERCIAL

## 2018-02-08 ENCOUNTER — OFFICE VISIT (OUTPATIENT)
Dept: MATERNAL FETAL MEDICINE | Facility: CLINIC | Age: 33
End: 2018-02-08
Attending: OBSTETRICS & GYNECOLOGY
Payer: COMMERCIAL

## 2018-02-08 DIAGNOSIS — O10.919 CHRONIC HYPERTENSION IN PREGNANCY: ICD-10-CM

## 2018-02-08 DIAGNOSIS — O10.919 CHRONIC HYPERTENSION AFFECTING PREGNANCY: Primary | ICD-10-CM

## 2018-02-08 PROCEDURE — 76816 OB US FOLLOW-UP PER FETUS: CPT

## 2018-02-08 NOTE — MR AVS SNAPSHOT
After Visit Summary   2/8/2018    Ruby Morrison    MRN: [id number]           Patient Information     Date Of Birth          1985        Visit Information        Provider Department      2/8/2018 10:45 AM Bouchra Greco DO [facility] Maternal Fetal Medicine  [place]        Today's Diagnoses     Chronic hypertension affecting pregnancy    -  1       Follow-ups after your visit        Your next 10 appointments already scheduled     Feb 20, 2018 11:45 AM CST   LAB with RD LAB   [facility] [place]    [address] [place] [address]   [phone]           Please do not eat 10-12 hours before your appointment if you are coming in fasting for labs on lipids, cholesterol, or glucose (sugar). This does not apply to pregnant women. Water, hot tea and black coffee (with nothing added) are okay. Do not drink other fluids, diet soda or chew gum.            Feb 20, 2018 12:45 PM CST   ESTABLISHED PRENATAL with Tomasa Bernal MD   [facility] [place]    [address] [place] [address]   [phone]              Future tests that were ordered for you today     Open Future Orders        Priority Expected Expires Ordered    MFM  Comprehensive Single F/U Routine 3/8/2018 2/8/2019 2/8/2018            Who to contact     If you have questions or need follow up information about today's clinic visit or your schedule please contact [facility]ER[facility] FETAL MEDIC[facility] directly at [phone].  Normal or non-critical lab and imaging results will be communicated to you by MyChart, letter or phone within 4 business days after the clinic has received the results. If you do not hear from us within 7 days, please contact the clinic through MyChart or phone. If you have a critical or abnormal lab result, we will notify you by phone as soon as possible.  Submit refill  requests through GoPlaceIt or call your pharmacy and they will forward the refill request to us. Please allow 3 business days for your refill to be completed.          Additional Information About Your Visit        Wibiyahart Information     GoPlaceIt gives you secure access to your electronic health record. If you see a primary care provider, you can also send messages to your care team and make appointments. If you have questions, please call your primary care clinic.  If you do not have a primary care provider, please call [phone] and they will assist you.        Care EveryWhere ID     This is your Care EveryWhere ID. This could be used by other organizations to access your [place] medical records  [id number]         Blood Pressure from Last 3 Encounters:   01/24/18 132/87   12/12/17 143/89   11/10/17 126/90    Weight from Last 3 Encounters:   01/24/18 103.6 kg (228 lb 4.8 oz)   12/12/17 104.6 kg (230 lb 8 oz)   11/10/17 102.2 kg (225 lb 6.4 oz)               Primary Care Provider Office Phone # Fax #    Mesha John  [phone]       [address] [facility] [address]        Equal Access to Services     BRIGID HWANG AH: Hadii ivy pazo Soomaali, waaxda luqadaha, qaybta kaalmada adeegyada, tosin vaca. So [facility] [phone].    ATENCIÓN: Si habla español, tiene a mendez disposición servicios gratuitos de asistencia lingüística. Norma al [phone].    We comply with applicable federal civil rights laws and [place] laws. We do not discriminate on the basis of race, color, national origin, age, disability, sex, sexual orientation, or gender identity.            Thank you!     Thank you for choosing [facility]  for your care. Our goal is always to provide you with excellent care. Hearing back from our patients is one way we can continue to improve our services. Please take a few minutes to complete the written survey that you  may receive in the mail after your visit with us. Thank you!             Your Updated Medication List - Protect others around you: Learn how to safely use, store and throw away your medicines at www.disposemymeds.org.          This list is accurate as of 2/8/18  3:20 PM.  Always use your most recent med list.                   Brand Name Dispense Instructions for use Diagnosis    albuterol 108 (90 BASE) MCG/ACT Inhaler    PROAIR HFA/PROVENTIL HFA/VENTOLIN HFA    1 Inhaler    Inhale 2 puffs into the lungs every 4 hours as needed for shortness of breath / dyspnea    Mild persistent asthma without complication       ASPIRIN PO      Take 81 mg by mouth        diclofenac 1 % Gel topical gel    VOLTAREN    100 g    Apply  2 grams to wrist area up to four times daily as needed using enclosed dosing card.    Radial styloid tenosynovitis, Right wrist pain       montelukast 10 MG tablet    SINGULAIR    90 tablet    Take 1 tablet (10 mg) by mouth At Bedtime    Mild persistent asthma without complication       order for DME     1 each    Equipment being ordered: Wrist brace    Radial styloid tenosynovitis of right hand       PRENATAL VITAMIN PO           VITAMIN D (CHOLECALCIFEROL) PO      Take 1,000 Units by mouth daily

## 2018-02-08 NOTE — PROGRESS NOTES
"Please see \"Imaging\" tab under \"Chart Review\" for details of today's US.    Bouchra Greco, DO    "

## 2018-02-20 ENCOUNTER — PRENATAL OFFICE VISIT (OUTPATIENT)
Dept: OBGYN | Facility: CLINIC | Age: 33
End: 2018-02-20
Payer: COMMERCIAL

## 2018-02-20 DIAGNOSIS — Z34.82 ENCOUNTER FOR SUPERVISION OF OTHER NORMAL PREGNANCY IN SECOND TRIMESTER: ICD-10-CM

## 2018-02-20 DIAGNOSIS — Z34.80 ENCOUNTER FOR SUPERVISION OF OTHER NORMAL PREGNANCY: Primary | ICD-10-CM

## 2018-02-20 LAB
GLUCOSE 1H P 50 G GLC PO SERPL-MCNC: 145 MG/DL (ref 60–129)
HGB BLD-MCNC: 10.7 G/DL (ref 11.7–15.7)

## 2018-02-20 PROCEDURE — 99207 ZZC PRENATAL VISIT: CPT | Performed by: OBSTETRICS & GYNECOLOGY

## 2018-02-20 PROCEDURE — 36415 COLL VENOUS BLD VENIPUNCTURE: CPT | Performed by: OBSTETRICS & GYNECOLOGY

## 2018-02-20 PROCEDURE — 82950 GLUCOSE TEST: CPT | Performed by: OBSTETRICS & GYNECOLOGY

## 2018-02-20 PROCEDURE — 00000218 ZZHCL STATISTIC OBHBG - HEMOGLOBIN: Performed by: OBSTETRICS & GYNECOLOGY

## 2018-02-20 NOTE — PROGRESS NOTES
Childbirth classes? NO  Plan on breastfeeding? Yes:  for four months last time, until going back to work when baby started refusing. Baby was in the NICU for a while last time, so started with a lot of pumping.   Birthcontrol? unsure  Sex on ultrasound? did not determine  Circumsion? not sure yet, thinks no.  Peds doc? Araceli Chavarria at [facility].    Last growth ultrasound showed 1 pounds 8 ounces, 61st %ile.   Convinced it's a boy, didn't disclose sex.  Exercising regularly.  Blood pressure good today.   RTC 4 weeks. GCT today.

## 2018-02-20 NOTE — MR AVS SNAPSHOT
After Visit Summary   2/20/2018    Ruby Morrison    MRN: [id number]           Patient Information     Date Of Birth          1985        Visit Information        Provider Department      2/20/2018 12:45 PM Tomasa Bernal MD [facility]        Today's Diagnoses     Encounter for supervision of other normal pregnancy in second trimester           Follow-ups after your visit        Your next 10 appointments already scheduled     Feb 20, 2018 12:45 PM CST   ESTABLISHED PRENATAL with Tomasa Bernal MD   [facility]    [address]  [address]  [facility] [id number] [phone]            Mar 08, 2018 10:15 AM CST   (Arrive by 10:00 AM)   MFM US COMPRE SINGLE F/U with [id number]   [facility])    [address]  [facility] [address] [phone]           Wear comfortable clothes and leave your valuables at home.            Mar 08, 2018 10:45 AM CST   Radiology MD with UR REEMA LISA   [facility])    [address]  [facility] [id number] [phone]           Please arrive at the time given for your first appointment. This visit is used internally to schedule the physician's time during your ultrasound.              Who to contact     If you have questions or need follow up information about today's clinic visit or your schedule please contact [facility] directly at [phone].  Normal or non-critical lab and imaging results will be communicated to you by MyChart, letter or phone within 4 business days after the clinic has received the results. If you do not hear from us within 7 days, please contact the clinic through MyChart or phone. If you have a critical or abnormal lab result, we will notify you by  phone as soon as possible.  Submit refill requests through [employer] or call your pharmacy and they will forward the refill request to us. Please allow 3 business days for your refill to be completed.          Additional Information About Your Visit        [employer] Organized Through Lovehart Information     [employer] gives you secure access to your electronic health record. If you see a primary care provider, you can also send messages to your care team and make appointments. If you have questions, please call your primary care clinic.  If you do not have a primary care provider, please call [phone] and they will assist you.        Care EveryWhere ID     This is your Care EveryWhere ID. This could be used by other organizations to access your [place] medical records  [id number]         Blood Pressure from Last 3 Encounters:   02/20/18 (P) 135/85   01/24/18 132/87   12/12/17 143/89    Weight from Last 3 Encounters:   02/20/18 (P) 232 lb 4.8 oz (105.4 kg)   01/24/18 228 lb 4.8 oz (103.6 kg)   12/12/17 230 lb 8 oz (104.6 kg)              We Performed the Following     Glucose tolerance gest screen 1 hour     OB hemoglobin        Primary Care Provider Office Phone # Fax #    Mesha JohnDO [phone]       [facility] [address]        Equal Access to Services     BRIGID HWANG : Hadii ivy ku hadasho Soomaali, waaxda luqadaha, qaybta kaalmada adeegyada, tosin amaya hayvictor manuel vaca. So [facility] [phone].    ATENCIÓN: Si habla español, tiene a mendez disposición servicios gratuitos de asistencia lingüística. Norma al [phone].    We comply with applicable federal civil rights laws and [place] laws. We do not discriminate on the basis of race, color, national origin, age, disability, sex, sexual orientation, or gender identity.            Thank you!     Thank you for choosing [facility]  for your care. Our goal is always to provide you with excellent care. Hearing back from our  patients is one way we can continue to improve our services. Please take a few minutes to complete the written survey that you may receive in the mail after your visit with us. Thank you!             Your Updated Medication List - Protect others around you: Learn how to safely use, store and throw away your medicines at www.disposemymeds.org.          This list is accurate as of 2/20/18 12:20 PM.  Always use your most recent med list.                   Brand Name Dispense Instructions for use Diagnosis    albuterol 108 (90 BASE) MCG/ACT Inhaler    PROAIR HFA/PROVENTIL HFA/VENTOLIN HFA    1 Inhaler    Inhale 2 puffs into the lungs every 4 hours as needed for shortness of breath / dyspnea    Mild persistent asthma without complication       ASPIRIN PO      Take 81 mg by mouth        diclofenac 1 % Gel topical gel    VOLTAREN    100 g    Apply  2 grams to wrist area up to four times daily as needed using enclosed dosing card.    Radial styloid tenosynovitis, Right wrist pain       montelukast 10 MG tablet    SINGULAIR    90 tablet    Take 1 tablet (10 mg) by mouth At Bedtime    Mild persistent asthma without complication       order for DME     1 each    Equipment being ordered: Wrist brace    Radial styloid tenosynovitis of right hand       PRENATAL VITAMIN PO           VITAMIN D (CHOLECALCIFEROL) PO      Take 1,000 Units by mouth daily

## 2018-02-21 ASSESSMENT — PATIENT HEALTH QUESTIONNAIRE - PHQ9: SUM OF ALL RESPONSES TO PHQ QUESTIONS 1-9: 0

## 2018-03-07 DIAGNOSIS — Z34.80 ENCOUNTER FOR SUPERVISION OF OTHER NORMAL PREGNANCY: ICD-10-CM

## 2018-03-07 PROCEDURE — 36415 COLL VENOUS BLD VENIPUNCTURE: CPT | Performed by: OBSTETRICS & GYNECOLOGY

## 2018-03-07 PROCEDURE — 82952 GTT-ADDED SAMPLES: CPT | Performed by: OBSTETRICS & GYNECOLOGY

## 2018-03-07 PROCEDURE — 82951 GLUCOSE TOLERANCE TEST (GTT): CPT | Performed by: OBSTETRICS & GYNECOLOGY

## 2018-03-08 ENCOUNTER — OFFICE VISIT (OUTPATIENT)
Dept: MATERNAL FETAL MEDICINE | Facility: CLINIC | Age: 33
End: 2018-03-08
Attending: OBSTETRICS & GYNECOLOGY
Payer: COMMERCIAL

## 2018-03-08 ENCOUNTER — HOSPITAL ENCOUNTER (OUTPATIENT)
Dept: ULTRASOUND IMAGING | Facility: CLINIC | Age: 33
Discharge: HOME | End: 2018-03-08
Attending: OBSTETRICS & GYNECOLOGY | Admitting: OBSTETRICS & GYNECOLOGY
Payer: COMMERCIAL

## 2018-03-08 DIAGNOSIS — O10.919 CHRONIC HYPERTENSION AFFECTING PREGNANCY: ICD-10-CM

## 2018-03-08 DIAGNOSIS — O40.9XX0 POLYHYDRAMNIOS AFFECTING PREGNANCY: Primary | ICD-10-CM

## 2018-03-08 LAB
GLUCOSE 1H P 100 G GLC PO SERPL-MCNC: 156 MG/DL (ref 60–179)
GLUCOSE 2H P 100 G GLC PO SERPL-MCNC: 172 MG/DL (ref 60–154)
GLUCOSE 3H P 100 G GLC PO SERPL-MCNC: 115 MG/DL (ref 60–139)
GLUCOSE P FAST SERPL-MCNC: 75 MG/DL (ref 60–94)

## 2018-03-08 PROCEDURE — 76816 OB US FOLLOW-UP PER FETUS: CPT

## 2018-03-08 NOTE — MR AVS SNAPSHOT
After Visit Summary   3/8/2018    Ruby Morrison    MRN: [id number]           Patient Information     Date Of Birth          1985        Visit Information        Provider Department      3/8/2018 10:45 AM Anand Arango MD [facility] Maternal Fetal Medicine [facility]        Today's Diagnoses     Polyhydramnios affecting pregnancy    -  1    Chronic hypertension affecting pregnancy           Follow-ups after your visit        Future tests that were ordered for you today     Open Future Orders        Priority Expected Expires Ordered    Maternal Fetal BPP Single Routine 3/15/2018 3/8/2019 3/8/2018    Maternal Fetal BPP Single Routine  3/8/2019 3/8/2018    Maternal Fetal BPP Single Routine  3/8/2019 3/8/2018    Maternal Fetal US Comprehensive Sngle FU Routine 4/5/2018 3/8/2019 3/8/2018            Who to contact     If you have questions or need follow up information about today's clinic visit or your schedule please contact [facility] directly at [phone].  Normal or non-critical lab and imaging results will be communicated to you by Vonagehart, letter or phone within 4 business days after the clinic has received the results. If you do not hear from us within 7 days, please contact the clinic through [employer] or phone. If you have a critical or abnormal lab result, we will notify you by phone as soon as possible.  Submit refill requests through [employer] or call your pharmacy and they will forward the refill request to us. Please allow 3 business days for your refill to be completed.          Additional Information About Your Visit        Vonagehart Information     [employer] gives you secure access to your electronic health record. If you see a primary care provider, you can also send messages to your care team and make appointments. If you have questions, please call your primary care clinic.  If you do not have a primary care provider, please call [phone] and  they will assist you.        Care EveryWhere ID     This is your Care EveryWhere ID. This could be used by other organizations to access your [place] medical records  [id number]         Blood Pressure from Last 3 Encounters:   02/20/18 (P) 135/85   01/24/18 132/87   12/12/17 143/89    Weight from Last 3 Encounters:   02/20/18 (P) 105.4 kg (232 lb 4.8 oz)   01/24/18 103.6 kg (228 lb 4.8 oz)   12/12/17 104.6 kg (230 lb 8 oz)               Primary Care Provider Office Phone # Fax #    Mesha John [phone]       [address] [place] [address]        Equal Access to Services     BRIGID HWANG : Evelin pazo Soboali, waaxda luqadaha, qaybta kaalmada adeegyada, tosin vaca. So [facility] [phone].    ATENCIÓN: Si habla español, tiene a mendez disposición servicios gratuitos de asistencia lingüística. Llame al [phone].    We comply with applicable federal civil rights laws and [place] laws. We do not discriminate on the basis of race, color, national origin, age, disability, sex, sexual orientation, or gender identity.            Thank you!     Thank you for choosing [facility]  for your care. Our goal is always to provide you with excellent care. Hearing back from our patients is one way we can continue to improve our services. Please take a few minutes to complete the written survey that you may receive in the mail after your visit with us. Thank you!             Your Updated Medication List - Protect others around you: Learn how to safely use, store and throw away your medicines at www.disposemymeds.org.          This list is accurate as of 3/8/18 11:19 AM.  Always use your most recent med list.                   Brand Name Dispense Instructions for use Diagnosis    albuterol 108 (90 BASE) MCG/ACT Inhaler    PROAIR HFA/PROVENTIL HFA/VENTOLIN HFA    1 Inhaler    Inhale 2 puffs into the lungs every 4 hours as needed  for shortness of breath / dyspnea    Mild persistent asthma without complication       ASPIRIN PO      Take 81 mg by mouth        diclofenac 1 % Gel topical gel    VOLTAREN    100 g    Apply  2 grams to wrist area up to four times daily as needed using enclosed dosing card.    Radial styloid tenosynovitis, Right wrist pain       montelukast 10 MG tablet    SINGULAIR    90 tablet    Take 1 tablet (10 mg) by mouth At Bedtime    Mild persistent asthma without complication       order for DME     1 each    Equipment being ordered: Wrist brace    Radial styloid tenosynovitis of right hand       PRENATAL VITAMIN PO           VITAMIN D (CHOLECALCIFEROL) PO      Take 1,000 Units by mouth daily

## 2018-03-08 NOTE — PROGRESS NOTES
"Please see \"Imaging\" tab under \"Chart Review\" for details of today's US at the [facility].    Anand Arango MD  Maternal-Fetal Medicine      "

## 2018-03-16 ENCOUNTER — OFFICE VISIT (OUTPATIENT)
Dept: MATERNAL FETAL MEDICINE | Facility: CLINIC | Age: 33
End: 2018-03-16
Attending: OBSTETRICS & GYNECOLOGY
Payer: COMMERCIAL

## 2018-03-16 ENCOUNTER — HOSPITAL ENCOUNTER (OUTPATIENT)
Dept: ULTRASOUND IMAGING | Facility: CLINIC | Age: 33
Discharge: HOME | End: 2018-03-16
Attending: OBSTETRICS & GYNECOLOGY | Admitting: OBSTETRICS & GYNECOLOGY
Payer: COMMERCIAL

## 2018-03-16 DIAGNOSIS — O40.9XX0 POLYHYDRAMNIOS AFFECTING PREGNANCY: ICD-10-CM

## 2018-03-16 DIAGNOSIS — O10.919 CHRONIC HYPERTENSION AFFECTING PREGNANCY: Primary | ICD-10-CM

## 2018-03-16 PROCEDURE — 76819 FETAL BIOPHYS PROFIL W/O NST: CPT

## 2018-03-16 NOTE — PROGRESS NOTES
"Please see \"Imaging\" tab under \"Chart Review\" for details of today's visit.    Holley Cordova    "

## 2018-03-16 NOTE — MR AVS SNAPSHOT
After Visit Summary   3/16/2018    Ruby Morrison    MRN: [id number]           Patient Information     Date Of Birth          1985        Visit Information        Provider Department      3/16/2018 4:00 PM Holley Cordova MD [facility] [place]        Today's Diagnoses     Chronic hypertension affecting pregnancy    -  1    Polyhydramnios affecting pregnancy           Follow-ups after your visit        Your next 10 appointments already scheduled     Mar 23, 2018 11:00 AM CDT   (Arrive by 10:45 AM)   MFNE BPP SINGLE with [id number]   [facility])    [address]  [facility] [address]   [phone]            Mar 23, 2018 11:30 AM CDT   Radiology MD with  REEMA LISA   [facility] - [facility])    [address]  [facility] [address]   [phone]           Please arrive at the time given for your first appointment. This visit is used internally to schedule the physician's time during your ultrasound.            Mar 30, 2018  3:30 PM CDT   (Arrive by 3:15 PM)   REEMA BPP SINGLE with [id number]   [facility] [place] ([facility])    [address]  [facility] [address] [phone]            Mar 30, 2018  4:00 PM CDT   Radiology MD with UR REEMA LISA   [facility] - [facility])    [address]  [facility] [address]   [phone]           Please arrive at the time given for your first appointment. This visit is used internally to schedule the physician's time during your ultrasound.            Apr 05, 2018  9:30 AM CDT   (Arrive by 9:15 AM)   REEMA US COMPRE SINGLE F/U with [id number]   [facility]    [address]  [facility] [address]   [phone]           Wear comfortable clothes and leave your valuables at home.            Apr 05, 2018 10:00 AM CDT   Radiology MD with UR REEMA LISA   [facility] Maternal Fetal Medicine [facility] ([facility])    [address]  [facility] [address]   [phone]           Please arrive at the time given for your first appointment. This visit is used internally to schedule the physician's time during your ultrasound.              Who to contact     If you have questions or need follow up information about today's clinic visit or your schedule please contact [facility]ER[facility] FETAL MEDIC[facility] directly at [phone].  Normal or non-critical lab and imaging results will be communicated to you by Orteqhart, letter or phone within 4 business days after the clinic has received the results. If you do not hear from us within 7 days, please contact the clinic through Orteqhart or phone. If you have a critical or abnormal lab result, we will notify you by phone as soon as possible.  Submit refill requests through [employer] or call your pharmacy and they will forward the refill request to us. Please allow 3 business days for your refill to be completed.          Additional Information About Your Visit        [employer] Information     [employer] gives you secure access to your electronic health record. If you see a primary care provider, you can also send messages to your care team and make appointments. If you have questions, please call your primary care clinic.  If you do not have a primary care provider, please call [phone] and they will assist you.        Care EveryWhere ID     This is your Care EveryWhere ID. This could be used by other organizations to access your [place] medical records  [id number]         Blood Pressure from Last 3 Encounters:   02/20/18 (P) 135/85   01/24/18 132/87   12/12/17 143/89    Weight  from Last 3 Encounters:   02/20/18 (P) 105.4 kg (232 lb 4.8 oz)   01/24/18 103.6 kg (228 lb 4.8 oz)   12/12/17 104.6 kg (230 lb 8 oz)              Today, you had the following     No orders found for display       Primary Care Provider Office Phone # Fax #    Mesha John [phone]       [address] [facility] [address]        Equal Access to Services     BRIGID HWANG : Hadii aad ku hadasho Soomaali, waaxda luqadaha, qaybta kaalmada adeegyada, waxay idiin hayaan adeeg everton vaca. So [facility] [place] [phone].    ATENCIÓN: Si habla español, tiene a mendez disposición servicios gratuitos de asistencia lingüística. Llame al [phone].    We comply with applicable federal civil rights laws and [place] laws. We do not discriminate on the basis of race, color, national origin, age, disability, sex, sexual orientation, or gender identity.            Thank you!     Thank you for choosing [facility]  for your care. Our goal is always to provide you with excellent care. Hearing back from our patients is one way we can continue to improve our services. Please take a few minutes to complete the written survey that you may receive in the mail after your visit with us. Thank you!             Your Updated Medication List - Protect others around you: Learn how to safely use, store and throw away your medicines at www.disposemymeds.org.          This list is accurate as of 3/16/18  5:15 PM.  Always use your most recent med list.                   Brand Name Dispense Instructions for use Diagnosis    albuterol 108 (90 BASE) MCG/ACT Inhaler    PROAIR HFA/PROVENTIL HFA/VENTOLIN HFA    1 Inhaler    Inhale 2 puffs into the lungs every 4 hours as needed for shortness of breath / dyspnea    Mild persistent asthma without complication       ASPIRIN PO      Take 81 mg by mouth        diclofenac 1 % Gel topical gel    VOLTAREN    100 g    Apply  2 grams to wrist area up to four  times daily as needed using enclosed dosing card.    Radial styloid tenosynovitis, Right wrist pain       montelukast 10 MG tablet    SINGULAIR    90 tablet    Take 1 tablet (10 mg) by mouth At Bedtime    Mild persistent asthma without complication       order for DME     1 each    Equipment being ordered: Wrist brace    Radial styloid tenosynovitis of right hand       PRENATAL VITAMIN PO           VITAMIN D (CHOLECALCIFEROL) PO      Take 1,000 Units by mouth daily

## 2018-03-22 DIAGNOSIS — J45.30 MILD PERSISTENT ASTHMA WITHOUT COMPLICATION: ICD-10-CM

## 2018-03-22 RX ORDER — MONTELUKAST SODIUM 10 MG/1
1 TABLET ORAL AT BEDTIME
Qty: 30 TABLET | Refills: 0 | Status: SHIPPED | OUTPATIENT
Start: 2018-03-22 | End: 2018-04-09

## 2018-03-22 NOTE — TELEPHONE ENCOUNTER
"Requested Prescriptions   Pending Prescriptions Disp Refills     montelukast (SINGULAIR) 10 MG tablet  Last Written Prescription Date:  12/22/2017  Last Fill Quantity: 90 tabs,  # refills: 0   Last office visit: 10/20/2017 with prescribing provider:  Kevin   Future Office Visit:     90 tablet 0     Sig: Take 1 tablet (10 mg) by mouth At Bedtime    Leukotriene Inhibitors Protocol Passed    3/22/2018  6:17 PM       Passed - Patient is age 12 or older    If patient is under 16, ok to refill using age based dosing.          Passed - Asthma control assessment score within normal limits in last 6 months    Please review ACT score.          Passed - Recent (6 mo) or future (30 days) visit within the authorizing provider's specialty    Patient had office visit in the last 6 months or has a visit in the next 30 days with authorizing provider or within the authorizing provider's specialty.  See \"Patient Info\" tab in inbasket, or \"Choose Columns\" in Meds & Orders section of the refill encounter.              "

## 2018-03-23 ENCOUNTER — OFFICE VISIT (OUTPATIENT)
Dept: MATERNAL FETAL MEDICINE | Facility: CLINIC | Age: 33
End: 2018-03-23
Attending: OBSTETRICS & GYNECOLOGY
Payer: COMMERCIAL

## 2018-03-23 ENCOUNTER — HOSPITAL ENCOUNTER (OUTPATIENT)
Dept: ULTRASOUND IMAGING | Facility: CLINIC | Age: 33
Discharge: HOME | End: 2018-03-23
Attending: OBSTETRICS & GYNECOLOGY | Admitting: OBSTETRICS & GYNECOLOGY
Payer: COMMERCIAL

## 2018-03-23 DIAGNOSIS — O40.3XX0 POLYHYDRAMNIOS IN THIRD TRIMESTER COMPLICATION, SINGLE OR UNSPECIFIED FETUS: Primary | ICD-10-CM

## 2018-03-23 DIAGNOSIS — O40.9XX0 POLYHYDRAMNIOS AFFECTING PREGNANCY: ICD-10-CM

## 2018-03-23 PROCEDURE — 76819 FETAL BIOPHYS PROFIL W/O NST: CPT

## 2018-03-23 NOTE — PROGRESS NOTES
Please see full imaging report from [facility]Point program under imaging tab.    Polyhydramnios has resolved. Will have one additional BPP, and if DAJA is normal x 2 weeks, can discontinue BPPs unless needed for other clinical indications. She has an interval growth US at 32 weeks for maternal obesity and chronic hypertension scheduled.     Single intramural fibroid 4.4 cm is noted incidentally during the BPP.     Deepak Norton MD  Maternal Fetal Medicine

## 2018-03-23 NOTE — MR AVS SNAPSHOT
After Visit Summary   3/23/2018    Ruby Morrison    MRN: [id number]           Patient Information     Date Of Birth          1985        Visit Information        Provider Department      3/23/2018 11:30 AM Deepak Norton MD [facility] Maternal Fetal Medicine [facility]        Today's Diagnoses     Polyhydramnios in third trimester complication, single or unspecified fetus    -  1       Follow-ups after your visit        Your next 10 appointments already scheduled     Mar 30, 2018  3:30 PM CDT   (Arrive by 3:15 PM)   REEMA ARZATE SINGLE with [id number]   [facility] Maternal Fetal Medicine Ultrasound - [facility])    [address]  [facility] [address]   [phone]            Mar 30, 2018  4:00 PM CDT   Radiology MD with SALONI BENAVIDEZ MD   [facility] Maternal Fetal Medicine - [facility])    [address]  [facility] [address]   [phone]           Please arrive at the time given for your first appointment. This visit is used internally to schedule the physician's time during your ultrasound.            Apr 05, 2018  9:30 AM CDT   (Arrive by 9:15 AM)   REEMA US COMPRE SINGLE F/U with [id number]   [facility] Maternal Fetal Medicine Ultrasound - [place] ([facility])    [address]  [facility] [address]   [phone]           Wear comfortable clothes and leave your valuables at home.            Apr 05, 2018 10:00 AM CDT   Radiology MD with SALONI BENAVIDEZ MD   [facility] Maternal Fetal Medicine - [facility])    [address]  [facility] [address]   [phone]           Please arrive at the time given for your first appointment. This visit is used internally to schedule the physician's time during your ultrasound.              Who to contact     If you have questions or need follow up information about today's  clinic visit or your schedule please contact [facility] directly at [phone].  Normal or non-critical lab and imaging results will be communicated to you by MyChart, letter or phone within 4 business days after the clinic has received the results. If you do not hear from us within 7 days, please contact the clinic through Vivortehart or phone. If you have a critical or abnormal lab result, we will notify you by phone as soon as possible.  Submit refill requests through Gamzee or call your pharmacy and they will forward the refill request to us. Please allow 3 business days for your refill to be completed.          Additional Information About Your Visit        [facility] Information     [employer] gives you secure access to your electronic health record. If you see a primary care provider, you can also send messages to your care team and make appointments. If you have questions, please call your primary care clinic.  If you do not have a primary care provider, please call [phone] and they will assist you.        Care EveryWhere ID     This is your Care EveryWhere ID. This could be used by other organizations to access your [facility] medical records  [id number]         Blood Pressure from Last 3 Encounters:   02/20/18 (P) 135/85   01/24/18 132/87   12/12/17 143/89    Weight from Last 3 Encounters:   02/20/18 (P) 105.4 kg (232 lb 4.8 oz)   01/24/18 103.6 kg (228 lb 4.8 oz)   12/12/17 104.6 kg (230 lb 8 oz)              Today, you had the following     No orders found for display       Primary Care Provider Office Phone # Fax #    Mesha DO Alvaro [phone]       [address] [facility] [address]        Equal Access to Services     BRIGID HWANG AH: Hadii ivy Lucero, wabettyda luqadaha, qaybta kaalmada dezda, tosin [facility] [phone].    ATENCIÓN: Si habla español, tiene a mendez disposición servicios gratuitos de  asistencia lingüística. Norma al [phone].    We comply with applicable federal civil rights laws and [place] laws. We do not discriminate on the basis of race, color, national origin, age, disability, sex, sexual orientation, or gender identity.            Thank you!     Thank you for choosing [facility]  for your care. Our goal is always to provide you with excellent care. Hearing back from our patients is one way we can continue to improve our services. Please take a few minutes to complete the written survey that you may receive in the mail after your visit with us. Thank you!             Your Updated Medication List - Protect others around you: Learn how to safely use, store and throw away your medicines at www.disposemymeds.org.          This list is accurate as of 3/23/18 12:10 PM.  Always use your most recent med list.                   Brand Name Dispense Instructions for use Diagnosis    albuterol 108 (90 BASE) MCG/ACT Inhaler    PROAIR HFA/PROVENTIL HFA/VENTOLIN HFA    1 Inhaler    Inhale 2 puffs into the lungs every 4 hours as needed for shortness of breath / dyspnea    Mild persistent asthma without complication       ASPIRIN PO      Take 81 mg by mouth        diclofenac 1 % Gel topical gel    VOLTAREN    100 g    Apply  2 grams to wrist area up to four times daily as needed using enclosed dosing card.    Radial styloid tenosynovitis, Right wrist pain       montelukast 10 MG tablet    SINGULAIR    30 tablet    Take 1 tablet (10 mg) by mouth At Bedtime    Mild persistent asthma without complication       order for DME     1 each    Equipment being ordered: Wrist brace    Radial styloid tenosynovitis of right hand       PRENATAL VITAMIN PO           VITAMIN D (CHOLECALCIFEROL) PO      Take 1,000 Units by mouth daily

## 2018-03-30 ENCOUNTER — PRENATAL OFFICE VISIT (OUTPATIENT)
Dept: OBGYN | Facility: CLINIC | Age: 33
End: 2018-03-30
Payer: COMMERCIAL

## 2018-03-30 ENCOUNTER — HOSPITAL ENCOUNTER (OUTPATIENT)
Dept: ULTRASOUND IMAGING | Facility: CLINIC | Age: 33
Discharge: HOME | End: 2018-03-30
Attending: OBSTETRICS & GYNECOLOGY | Admitting: OBSTETRICS & GYNECOLOGY
Payer: COMMERCIAL

## 2018-03-30 ENCOUNTER — OFFICE VISIT (OUTPATIENT)
Dept: MATERNAL FETAL MEDICINE | Facility: CLINIC | Age: 33
End: 2018-03-30
Attending: OBSTETRICS & GYNECOLOGY
Payer: COMMERCIAL

## 2018-03-30 VITALS
HEART RATE: 110 BPM | DIASTOLIC BLOOD PRESSURE: 89 MMHG | SYSTOLIC BLOOD PRESSURE: 142 MMHG | WEIGHT: 235.6 LBS | BODY MASS INDEX: 46.01 KG/M2

## 2018-03-30 DIAGNOSIS — Z34.83 ENCOUNTER FOR SUPERVISION OF OTHER NORMAL PREGNANCY IN THIRD TRIMESTER: Primary | ICD-10-CM

## 2018-03-30 DIAGNOSIS — O40.9XX0 POLYHYDRAMNIOS AFFECTING PREGNANCY: ICD-10-CM

## 2018-03-30 DIAGNOSIS — O10.919 CHRONIC HYPERTENSION AFFECTING PREGNANCY: Primary | ICD-10-CM

## 2018-03-30 PROCEDURE — 99207 ZZC PRENATAL VISIT: CPT | Performed by: OBSTETRICS & GYNECOLOGY

## 2018-03-30 PROCEDURE — 76819 FETAL BIOPHYS PROFIL W/O NST: CPT

## 2018-03-30 NOTE — PROGRESS NOTES
"Please see \"Imaging\" tab under \"Chart Review\" for details of today's US.      Nhung Zimmerman, DO  Maternal-Fetal Medicine        "

## 2018-03-30 NOTE — MR AVS SNAPSHOT
After Visit Summary   3/30/2018    Ruby Morrison    MRN: [id number]           Patient Information     Date Of Birth          1985        Visit Information        Provider Department      3/30/2018 4:00 PM Nhung Zimmerman,  [facility] Maternal Fetal Medicine [facility]        Today's Diagnoses     Chronic hypertension affecting pregnancy    -  1       Follow-ups after your visit        Your next 10 appointments already scheduled     Apr 05, 2018  9:30 AM CDT   (Arrive by 9:15 AM)   MFM US COMPRE SINGLE F/U with [id number]   [facility] Maternal Fetal Medicine Ultrasound - [facility])    [address]  [facility] [address] [phone]           Wear comfortable clothes and leave your valuables at home.            Apr 05, 2018 10:00 AM CDT   Radiology MD with UR REEMA LISA   [facility] Maternal Fetal Medicine - [facility])    [address]  [facility] [address]   [phone]           Please arrive at the time given for your first appointment. This visit is used internally to schedule the physician's time during your ultrasound.              Who to contact     If you have questions or need follow up information about today's clinic visit or your schedule please contact [facility] MATERNAL FETAL MEDICINE [facility] directly at [phone].  Normal or non-critical lab and imaging results will be communicated to you by MyChart, letter or phone within 4 business days after the clinic has received the results. If you do not hear from us within 7 days, please contact the clinic through MyChart or phone. If you have a critical or abnormal lab result, we will notify you by phone as soon as possible.  Submit refill requests through CatchTheEye or call your pharmacy and they will forward the refill request to us. Please allow 3 business days for your refill to be completed.          Additional Information  About Your Visit        [employer] Information     [facility] gives you secure access to your electronic health record. If you see a primary care provider, you can also send messages to your care team and make appointments. If you have questions, please call your primary care clinic.  If you do not have a primary care provider, please call [phone] and they will assist you.        Care EveryWhere ID     This is your Care EveryWhere ID. This could be used by other organizations to access your [place] medical records  [id number]         Blood Pressure from Last 3 Encounters:   03/30/18 142/89   02/20/18 (P) 135/85   01/24/18 132/87    Weight from Last 3 Encounters:   03/30/18 106.9 kg (235 lb 9.6 oz)   02/20/18 (P) 105.4 kg (232 lb 4.8 oz)   01/24/18 103.6 kg (228 lb 4.8 oz)              Today, you had the following     No orders found for display       Primary Care Provider Office Phone # Fax #    Mesha DO Alvaro [phone]       [facility] [address]        Equal Access to Services     DEEPAK HWANG : Hadii ivy ku hadasho Soboali, waaxda luqadaha, qaybta kaalmada adeyovaniyada, tosin rollins . So [facility] [phone].    ATENCIÓN: Si habla español, tiene a mendez disposición servicios gratuitos de asistencia lingüística. Llame al [phone].    We comply with applicable federal civil rights laws and [place] laws. We do not discriminate on the basis of race, color, national origin, age, disability, sex, sexual orientation, or gender identity.            Thank you!     Thank you for choosing [facility]  for your care. Our goal is always to provide you with excellent care. Hearing back from our patients is one way we can continue to improve our services. Please take a few minutes to complete the written survey that you may receive in the mail after your visit with us. Thank you!             Your Updated Medication List - Protect  others around you: Learn how to safely use, store and throw away your medicines at www.disposemymeds.org.          This list is accurate as of 3/30/18  4:27 PM.  Always use your most recent med list.                   Brand Name Dispense Instructions for use Diagnosis    albuterol 108 (90 BASE) MCG/ACT Inhaler    PROAIR HFA/PROVENTIL HFA/VENTOLIN HFA    1 Inhaler    Inhale 2 puffs into the lungs every 4 hours as needed for shortness of breath / dyspnea    Mild persistent asthma without complication       ASPIRIN PO      Take 81 mg by mouth        diclofenac 1 % Gel topical gel    VOLTAREN    100 g    Apply  2 grams to wrist area up to four times daily as needed using enclosed dosing card.    Radial styloid tenosynovitis, Right wrist pain       montelukast 10 MG tablet    SINGULAIR    30 tablet    Take 1 tablet (10 mg) by mouth At Bedtime    Mild persistent asthma without complication       order for DME     1 each    Equipment being ordered: Wrist brace    Radial styloid tenosynovitis of right hand       PRENATAL VITAMIN PO           VITAMIN D (CHOLECALCIFEROL) PO      Take 1,000 Units by mouth daily

## 2018-03-30 NOTE — MR AVS SNAPSHOT
After Visit Summary   3/30/2018    Ruby Morrison    MRN: [id number]           Patient Information     Date Of Birth          1985        Visit Information        Provider Department      3/30/2018 3:00 PM Tomasa Bernal MD [facility]        Today's Diagnoses     Encounter for supervision of other normal pregnancy in third trimester    -  1       Follow-ups after your visit        Your next 10 appointments already scheduled     Apr 05, 2018  9:30 AM CDT   (Arrive by 9:15 AM)   MFM US COMPRE SINGLE F/U with [id number]   [facility] Maternal Fetal Medicine Ultrasound - [facility])    [address]  [facility] [address] [phone]           Wear comfortable clothes and leave your valuables at home.            Apr 05, 2018 10:00 AM CDT   Radiology MD with UR REEMA LISA   [facility]etal [facility] - [facility])    [address]  [facility] [address]   [phone]           Please arrive at the time given for your first appointment. This visit is used internally to schedule the physician's time during your ultrasound.              Who to contact     If you have questions or need follow up information about today's clinic visit or your schedule please contact [facility] directly at [phone].  Normal or non-critical lab and imaging results will be communicated to you by MyChart, letter or phone within 4 business days after the clinic has received the results. If you do not hear from us within 7 days, please contact the clinic through MyChart or phone. If you have a critical or abnormal lab result, we will notify you by phone as soon as possible.  Submit refill requests through [employer] or call your pharmacy and they will forward the refill request to us. Please allow 3 business days for your refill to be completed.          Additional  Information About Your Visit        MyChart Information     [employer] gives you secure access to your electronic health record. If you see a primary care provider, you can also send messages to your care team and make appointments. If you have questions, please call your primary care clinic.  If you do not have a primary care provider, please call [phone] and they will assist you.        Care EveryWhere ID     This is your Care EveryWhere ID. This could be used by other organizations to access your [place] medical records  [id number]        Your Vitals Were     Pulse BMI (Body Mass Index)                110 46.01 kg/m2           Blood Pressure from Last 3 Encounters:   03/30/18 142/89   02/20/18 (P) 135/85   01/24/18 132/87    Weight from Last 3 Encounters:   03/30/18 235 lb 9.6 oz (106.9 kg)   02/20/18 (P) 232 lb 4.8 oz (105.4 kg)   01/24/18 228 lb 4.8 oz (103.6 kg)              Today, you had the following     No orders found for display       Primary Care Provider Office Phone # Fax #    Mesha John [phone]       [address] [facility] [address]        Equal Access to Services     BRGIID HWANG AH: Hadii ivy pazo Soomaali, waaxda luqadaha, qaybta kaalmada adeegyada, tosin vaca. So [facility] [phone].    ATENCIÓN: Si habla español, tiene a mendez disposición servicios gratuitos de asistencia lingüística. Llame al [phone].    We comply with applicable federal civil rights laws and [place] laws. We do not discriminate on the basis of race, color, national origin, age, disability, sex, sexual orientation, or gender identity.            Thank you!     Thank you for choosing [facility]  for your care. Our goal is always to provide you with excellent care. Hearing back from our patients is one way we can continue to improve our services. Please take a few minutes to complete the written survey that you may receive in the mail after  your visit with us. Thank you!             Your Updated Medication List - Protect others around you: Learn how to safely use, store and throw away your medicines at www.disposemymeds.org.          This list is accurate as of 3/30/18  9:57 PM.  Always use your most recent med list.                   Brand Name Dispense Instructions for use Diagnosis    albuterol 108 (90 BASE) MCG/ACT Inhaler    PROAIR HFA/PROVENTIL HFA/VENTOLIN HFA    1 Inhaler    Inhale 2 puffs into the lungs every 4 hours as needed for shortness of breath / dyspnea    Mild persistent asthma without complication       ASPIRIN PO      Take 81 mg by mouth        diclofenac 1 % Gel topical gel    VOLTAREN    100 g    Apply  2 grams to wrist area up to four times daily as needed using enclosed dosing card.    Radial styloid tenosynovitis, Right wrist pain       montelukast 10 MG tablet    SINGULAIR    30 tablet    Take 1 tablet (10 mg) by mouth At Bedtime    Mild persistent asthma without complication       order for DME     1 each    Equipment being ordered: Wrist brace    Radial styloid tenosynovitis of right hand       PRENATAL VITAMIN PO           VITAMIN D (CHOLECALCIFEROL) PO      Take 1,000 Units by mouth daily

## 2018-03-31 NOTE — PROGRESS NOTES
Doing ok. Uncomfortable, but otherwise no concerns.  Had poly, resolved on last ultrasound. Recheck today.  Good fetal movement.   RTC 2 weeks.

## 2018-04-05 ENCOUNTER — HOSPITAL ENCOUNTER (OUTPATIENT)
Dept: ULTRASOUND IMAGING | Facility: CLINIC | Age: 33
Discharge: HOME | End: 2018-04-05
Attending: OBSTETRICS & GYNECOLOGY | Admitting: OBSTETRICS & GYNECOLOGY
Payer: COMMERCIAL

## 2018-04-05 ENCOUNTER — OFFICE VISIT (OUTPATIENT)
Dept: MATERNAL FETAL MEDICINE | Facility: CLINIC | Age: 33
End: 2018-04-05
Attending: OBSTETRICS & GYNECOLOGY
Payer: COMMERCIAL

## 2018-04-05 DIAGNOSIS — O10.919 CHRONIC HYPERTENSION IN PREGNANCY: Primary | ICD-10-CM

## 2018-04-05 DIAGNOSIS — O40.9XX0 POLYHYDRAMNIOS AFFECTING PREGNANCY: ICD-10-CM

## 2018-04-05 PROCEDURE — 76819 FETAL BIOPHYS PROFIL W/O NST: CPT | Performed by: OBSTETRICS & GYNECOLOGY

## 2018-04-05 PROCEDURE — 76816 OB US FOLLOW-UP PER FETUS: CPT

## 2018-04-05 NOTE — MR AVS SNAPSHOT
After Visit Summary   4/5/2018    Ruby Morrison    MRN: [id number]           Patient Information     Date Of Birth          1985        Visit Information        Provider Department      4/5/2018 10:00 AM Bouchra Greco DO [facility]        Today's Diagnoses     Chronic hypertension in pregnancy    -  1       Follow-ups after your visit        Your next 10 appointments already scheduled     Apr 12, 2018  3:30 PM CDT   (Arrive by 3:15 PM)   REEMA ARZATE SINGLE with [id number]   [facility] Maternal Fetal Medicine Ultrasound - [place] ([facility])    [address]  [facility] [address] [phone]            Apr 12, 2018  4:00 PM CDT   Radiology MD with UR REEMA LISA   [facility] Maternal Fetal Medicine [facility])    [address]  [facility] [address] [phone]           Please arrive at the time given for your first appointment. This visit is used internally to schedule the physician's time during your ultrasound.            Apr 13, 2018 10:45 AM CDT   ESTABLISHED PRENATAL with Maria Elena Box MD   [facility] ([facility])    [address]  [address]  [facility] [address] [phone]            Apr 26, 2018  4:15 PM CDT   ESTABLISHED PRENATAL with Maria Elena Sterling MD   [facility] ([facility])    [address]  [facility] [address]  [facility] [address] [phone]              Who to contact     If you have questions or need follow up information about today's clinic visit or your schedule please contact [facility] MATERNAL FETAL MEDICINE [facility] directly at [phone].  Normal or non-critical lab and imaging results will be communicated to you by MyChart, letter or phone within 4 business days after the clinic has received the results. If you do not hear  from us within 7 days, please contact the clinic through [employer] or phone. If you have a critical or abnormal lab result, we will notify you by phone as soon as possible.  Submit refill requests through [employer] or call your pharmacy and they will forward the refill request to us. Please allow 3 business days for your refill to be completed.          Additional Information About Your Visit        [employer] Information     [employer] gives you secure access to your electronic health record. If you see a primary care provider, you can also send messages to your care team and make appointments. If you have questions, please call your primary care clinic.  If you do not have a primary care provider, please call [phone] and they will assist you.        Care EveryWhere ID     This is your Care EveryWhere ID. This could be used by other organizations to access your [place] medical records  [id number]         Blood Pressure from Last 3 Encounters:   03/30/18 142/89   02/20/18 (P) 135/85   01/24/18 132/87    Weight from Last 3 Encounters:   03/30/18 106.9 kg (235 lb 9.6 oz)   02/20/18 (P) 105.4 kg (232 lb 4.8 oz)   01/24/18 103.6 kg (228 lb 4.8 oz)               Primary Care Provider Office Phone # Fax #    Mesha John  [phone]       [address] [facility] [address]        Equal Access to Services     BRIGID HWANG : Hadii ivy ku hadasho Soomaali, waaxda luqadaha, qaybta kaalmada adeyovaniyada, tosin rollins . So [facility] [phone].    ATENCIÓN: Si habla español, tiene a mendez disposición servicios gratuitos de asistencia lingüística. Llame al [phone].    We comply with applicable federal civil rights laws and [place] laws. We do not discriminate on the basis of race, color, national origin, age, disability, sex, sexual orientation, or gender identity.            Thank you!     Thank you for choosing [facility]  for your care. Our goal  is always to provide you with excellent care. Hearing back from our patients is one way we can continue to improve our services. Please take a few minutes to complete the written survey that you may receive in the mail after your visit with us. Thank you!             Your Updated Medication List - Protect others around you: Learn how to safely use, store and throw away your medicines at www.disposemymeds.org.          This list is accurate as of 4/5/18 11:59 PM.  Always use your most recent med list.                   Brand Name Dispense Instructions for use Diagnosis    albuterol 108 (90 BASE) MCG/ACT Inhaler    PROAIR HFA/PROVENTIL HFA/VENTOLIN HFA    1 Inhaler    Inhale 2 puffs into the lungs every 4 hours as needed for shortness of breath / dyspnea    Mild persistent asthma without complication       ASPIRIN PO      Take 81 mg by mouth        diclofenac 1 % Gel topical gel    VOLTAREN    100 g    Apply  2 grams to wrist area up to four times daily as needed using enclosed dosing card.    Radial styloid tenosynovitis, Right wrist pain       montelukast 10 MG tablet    SINGULAIR    30 tablet    Take 1 tablet (10 mg) by mouth At Bedtime    Mild persistent asthma without complication       order for DME     1 each    Equipment being ordered: Wrist brace    Radial styloid tenosynovitis of right hand       PRENATAL VITAMIN PO           VITAMIN D (CHOLECALCIFEROL) PO      Take 1,000 Units by mouth daily

## 2018-04-09 ENCOUNTER — MYC MEDICAL ADVICE (OUTPATIENT)
Dept: FAMILY MEDICINE | Facility: CLINIC | Age: 33
End: 2018-04-09

## 2018-04-09 DIAGNOSIS — J45.30 MILD PERSISTENT ASTHMA WITHOUT COMPLICATION: ICD-10-CM

## 2018-04-09 NOTE — TELEPHONE ENCOUNTER
Route to PCP. Per last singulair refill, states in comments patient needs an appt in April. Patient states she is nearing the end of her pregnancy and does not have time to come in. Ok to do a refill until able to come in?

## 2018-04-12 ENCOUNTER — HOSPITAL ENCOUNTER (OUTPATIENT)
Dept: ULTRASOUND IMAGING | Facility: CLINIC | Age: 33
Discharge: HOME | End: 2018-04-12
Attending: OBSTETRICS & GYNECOLOGY | Admitting: OBSTETRICS & GYNECOLOGY
Payer: COMMERCIAL

## 2018-04-12 ENCOUNTER — OFFICE VISIT (OUTPATIENT)
Dept: MATERNAL FETAL MEDICINE | Facility: CLINIC | Age: 33
End: 2018-04-12
Attending: OBSTETRICS & GYNECOLOGY
Payer: COMMERCIAL

## 2018-04-12 DIAGNOSIS — O40.3XX0 POLYHYDRAMNIOS IN THIRD TRIMESTER COMPLICATION, SINGLE OR UNSPECIFIED FETUS: ICD-10-CM

## 2018-04-12 DIAGNOSIS — O10.919 CHRONIC HYPERTENSION IN PREGNANCY: Primary | ICD-10-CM

## 2018-04-12 DIAGNOSIS — O10.919 CHRONIC HYPERTENSION IN PREGNANCY: ICD-10-CM

## 2018-04-12 PROCEDURE — 76819 FETAL BIOPHYS PROFIL W/O NST: CPT

## 2018-04-12 RX ORDER — MONTELUKAST SODIUM 10 MG/1
1 TABLET ORAL AT BEDTIME
Qty: 90 TABLET | Refills: 1 | Status: SHIPPED | OUTPATIENT
Start: 2018-04-12 | End: 2018-10-10

## 2018-04-12 NOTE — MR AVS SNAPSHOT
After Visit Summary   4/12/2018    Ruby Morrison    MRN: [id number]           Patient Information     Date Of Birth          1985        Visit Information        Provider Department      4/12/2018 4:00 PM Nhung Zimmerman DO [facility] Maternal Fetal Medicine [facility]        Today's Diagnoses     Chronic hypertension in pregnancy    -  1    Polyhydramnios in third trimester complication, single or unspecified fetus           Follow-ups after your visit        Your next 10 appointments already scheduled     Apr 12, 2018  4:00 PM CDT   Radiology MD with Nhung Zimmerman DO   [facility] Maternal Fetal Medicine [facility] ([facility])    [address] [facility] [address]   [phone]           Please arrive at the time given for your first appointment. This visit is used internally to schedule the physician's time during your ultrasound.            Apr 13, 2018 10:45 AM CDT   ESTABLISHED PRENATAL with Maria Elena Box MD   [facility]    [address] [place] [address] [address]  [facility] [address] [phone]            Apr 26, 2018  4:15 PM CDT   ESTABLISHED PRENATAL with Maria Elena Sterling MD   [facility] ([facility]    [address] [place] [address]  [facility] [address] [phone]              Future tests that were ordered for you today     Open Future Orders        Priority Expected Expires Ordered    Maternal Fetal BPP Single Routine 4/19/2018 4/12/2019 4/12/2018    Maternal Fetal BPP Single Routine  4/12/2019 4/12/2018    Maternal Fetal US Comprehensive Sngle FU Routine 5/3/2018 4/12/2019 4/12/2018            Who to contact     If you have questions or need follow up information about today's clinic visit or your schedule please contact [facility] MATERNAL FETAL MEDICINE [facility] directly at [phone].  Normal or non-critical lab and imaging  results will be communicated to you by MyChart, letter or phone within 4 business days after the clinic has received the results. If you do not hear from us within 7 days, please contact the clinic through [facility] or phone. If you have a critical or abnormal lab result, we will notify you by phone as soon as possible.  Submit refill requests through Agilence or call your pharmacy and they will forward the refill request to us. Please allow 3 business days for your refill to be completed.          Additional Information About Your Visit        Agilence Information     Agilence gives you secure access to your electronic health record. If you see a primary care provider, you can also send messages to your care team and make appointments. If you have questions, please call your primary care clinic.  If you do not have a primary care provider, please call [phone] and they will assist you.        Care EveryWhere ID     This is your Care EveryWhere ID. This could be used by other organizations to access your [facility] medical records  [id number]         Blood Pressure from Last 3 Encounters:   03/30/18 142/89   02/20/18 (P) 135/85   01/24/18 132/87    Weight from Last 3 Encounters:   03/30/18 106.9 kg (235 lb 9.6 oz)   02/20/18 (P) 105.4 kg (232 lb 4.8 oz)   01/24/18 103.6 kg (228 lb 4.8 oz)               Primary Care Provider Office Phone # Fax #    Mesha John [phone]       [facility] [address]        Equal Access to Services     BRIGID HWANG AH: Hadii aad ku hadasho Soomaali, waaxda luqadaha, qaybta kaalmada adeegyada, tosin vaca. So [facility] [phone].    ATENCIÓN: Si habla español, tiene a mendez disposición servicios gratuitos de asistencia lingüística. Llame al [phone].    We comply with applicable federal civil rights laws and [place] laws. We do not discriminate on the basis of race, color, national origin, age, disability, sex, sexual  orientation, or gender identity.            Thank you!     Thank you for choosing [facility]  for your care. Our goal is always to provide you with excellent care. Hearing back from our patients is one way we can continue to improve our services. Please take a few minutes to complete the written survey that you may receive in the mail after your visit with us. Thank you!             Your Updated Medication List - Protect others around you: Learn how to safely use, store and throw away your medicines at www.disposemymeds.org.          This list is accurate as of 4/12/18  3:59 PM.  Always use your most recent med list.                   Brand Name Dispense Instructions for use Diagnosis    albuterol 108 (90 Base) MCG/ACT Inhaler    PROAIR HFA/PROVENTIL HFA/VENTOLIN HFA    1 Inhaler    Inhale 2 puffs into the lungs every 4 hours as needed for shortness of breath / dyspnea    Mild persistent asthma without complication       ASPIRIN PO      Take 81 mg by mouth        diclofenac 1 % Gel topical gel    VOLTAREN    100 g    Apply  2 grams to wrist area up to four times daily as needed using enclosed dosing card.    Radial styloid tenosynovitis, Right wrist pain       montelukast 10 MG tablet    SINGULAIR    90 tablet    Take 1 tablet (10 mg) by mouth At Bedtime    Mild persistent asthma without complication       order for DME     1 each    Equipment being ordered: Wrist brace    Radial styloid tenosynovitis of right hand       PRENATAL VITAMIN PO           VITAMIN D (CHOLECALCIFEROL) PO      Take 1,000 Units by mouth daily

## 2018-04-13 ENCOUNTER — PRENATAL OFFICE VISIT (OUTPATIENT)
Dept: OBGYN | Facility: CLINIC | Age: 33
End: 2018-04-13
Payer: COMMERCIAL

## 2018-04-13 VITALS
HEART RATE: 88 BPM | WEIGHT: 237 LBS | DIASTOLIC BLOOD PRESSURE: 96 MMHG | SYSTOLIC BLOOD PRESSURE: 146 MMHG | BODY MASS INDEX: 46.29 KG/M2

## 2018-04-13 DIAGNOSIS — O10.919 CHRONIC HYPERTENSION AFFECTING PREGNANCY: ICD-10-CM

## 2018-04-13 DIAGNOSIS — Z34.83 ENCOUNTER FOR SUPERVISION OF OTHER NORMAL PREGNANCY IN THIRD TRIMESTER: Primary | ICD-10-CM

## 2018-04-13 DIAGNOSIS — Z23 NEED FOR TDAP VACCINATION: ICD-10-CM

## 2018-04-13 PROCEDURE — 90715 TDAP VACCINE 7 YRS/> IM: CPT | Performed by: OBSTETRICS & GYNECOLOGY

## 2018-04-13 PROCEDURE — 99207 ZZC PRENATAL VISIT: CPT | Performed by: OBSTETRICS & GYNECOLOGY

## 2018-04-13 PROCEDURE — 90471 IMMUNIZATION ADMIN: CPT | Performed by: OBSTETRICS & GYNECOLOGY

## 2018-04-13 NOTE — MR AVS SNAPSHOT
After Visit Summary   4/13/2018    Ruby Morrison    MRN: [id number]           Patient Information     Date Of Birth          1985        Visit Information        Provider Department      4/13/2018 10:45 AM Maria Elena Box MD [facility]        Today's Diagnoses     Encounter for supervision of other normal pregnancy in third trimester    -  1    Chronic hypertension affecting pregnancy        Need for Tdap vaccination           Follow-ups after your visit        Your next 10 appointments already scheduled     Apr 17, 2018  3:30 PM CDT   [facility] BPP SINGLE with [id number]   [facility] [place] ([facility])    [address]  [facility] [address]   [phone]            Apr 17, 2018  4:00 PM CDT   Radiology MD with SALONI BENAVIDEZ MD   [facility])    [address]  [facility] [address]   [phone]           Please arrive at the time given for your first appointment. This visit is used internally to schedule the physician's time during your ultrasound.            Apr 26, 2018  3:30 PM CDT   (Arrive by 3:15 PM)   [facility] BPP SINGLE with [email or web address]   [facility] [place] ([facility])    [address]  [facility] [address]   [phone]            Apr 26, 2018  4:00 PM CDT   Radiology MD with UR REEMA LISA   [facility] [place] ([facility])    [address]  [facility] [address]   [phone]           Please arrive at the time given for your first appointment. This visit is used internally to schedule the physician's time during your ultrasound.            Apr 26, 2018  4:15 PM CDT   ESTABLISHED PRENATAL with Maria Elena Sterling MD   [facility] ([facility]  [facility])    [address] [facility]  [address]  [facility] [address] [phone]            May 02, 2018 11:00 AM CDT   (Arrive by 10:45 AM)   [facility] US COMPRE SINGLE F/U with [email or web address]   [facility] [place] ([facility])    [address]  [facility] [id number] [phone]           Wear comfortable clothes and leave your valuables at home.            May 02, 2018 11:30 AM CDT   Radiology MD with UR REEMA LISA   [facility] [place] ([facility])    [address]  [facility] [address]   [phone]           Please arrive at the time given for your first appointment. This visit is used internally to schedule the physician's time during your ultrasound.              Future tests that were ordered for you today     Open Future Orders        Priority Expected Expires Ordered    Maternal Fetal BPP Single Routine 4/19/2018 4/12/2019 4/12/2018    Maternal Fetal BPP Single Routine  4/12/2019 4/12/2018    Maternal Fetal US Comprehensive Sngle FU Routine 5/3/2018 4/12/2019 4/12/2018            Who to contact     If you have questions or need follow up information about today's clinic visit or your schedule please contact [facility] directly at [phone].  Normal or non-critical lab and imaging results will be communicated to you by MyChart, letter or phone within 4 business days after the clinic has received the results. If you do not hear from us within 7 days, please contact the clinic through PanAtlantahart or phone. If you have a critical or abnormal lab result, we will notify you by phone as soon as possible.  Submit refill requests through Deskarma or call your pharmacy and they will forward the refill request to us. Please allow 3 business days for your refill to be completed.          Additional Information About Your Visit        [facility]  Information     [employer] gives you secure access to your electronic health record. If you see a primary care provider, you can also send messages to your care team and make appointments. If you have questions, please call your primary care clinic.  If you do not have a primary care provider, please call [phone] and they will assist you.        Care EveryWhere ID     This is your Care EveryWhere ID. This could be used by other organizations to access your [facility] medical records  [id number]        Your Vitals Were     Pulse BMI (Body Mass Index)                88 46.29 kg/m2           Blood Pressure from Last 3 Encounters:   04/13/18 (!) 146/96   03/30/18 142/89   02/20/18 (P) 135/85    Weight from Last 3 Encounters:   04/13/18 237 lb (107.5 kg)   03/30/18 235 lb 9.6 oz (106.9 kg)   02/20/18 (P) 232 lb 4.8 oz (105.4 kg)              We Performed the Following     TDAP VACCINE (ADACEL)     VACCINE ADMINISTRATION, INITIAL        Primary Care Provider Office Phone # Fax #    Mesha Hatchtristen  [phone]       [address] [facility] [address]        Equal Access to Services     BRIGID HWANG AH: Hadii ivy pazo Soboali, waaxda luqadaha, qaybta kaalmada adeegyada, tosin vaca. So [facility] [phone].    ATENCIÓN: Si habla español, tiene a mendez disposición servicios gratuitos de asistencia lingüística. Llame al [phone].    We comply with applicable federal civil rights laws and [place] laws. We do not discriminate on the basis of race, color, national origin, age, disability, sex, sexual orientation, or gender identity.            Thank you!     Thank you for choosing [facility]  for your care. Our goal is always to provide you with excellent care. Hearing back from our patients is one way we can continue to improve our services. Please take a few minutes to complete the written survey that you may receive in the mail after your visit with  us. Thank you!             Your Updated Medication List - Protect others around you: Learn how to safely use, store and throw away your medicines at www.disposemymeds.org.          This list is accurate as of 4/13/18 11:15 AM.  Always use your most recent med list.                   Brand Name Dispense Instructions for use Diagnosis    albuterol 108 (90 Base) MCG/ACT Inhaler    PROAIR HFA/PROVENTIL HFA/VENTOLIN HFA    1 Inhaler    Inhale 2 puffs into the lungs every 4 hours as needed for shortness of breath / dyspnea    Mild persistent asthma without complication       ASPIRIN PO      Take 81 mg by mouth        diclofenac 1 % Gel topical gel    VOLTAREN    100 g    Apply  2 grams to wrist area up to four times daily as needed using enclosed dosing card.    Radial styloid tenosynovitis, Right wrist pain       montelukast 10 MG tablet    SINGULAIR    90 tablet    Take 1 tablet (10 mg) by mouth At Bedtime    Mild persistent asthma without complication       order for DME     1 each    Equipment being ordered: Wrist brace    Radial styloid tenosynovitis of right hand       PRENATAL VITAMIN PO           VITAMIN D (CHOLECALCIFEROL) PO      Take 1,000 Units by mouth daily

## 2018-04-13 NOTE — PROGRESS NOTES
32w4d  BP slightly higher today.  Asymptomatic.  Reviewed signs/symptoms of pre-eclampsia.  Recommend visit to check BP in 1w.  Will coordinate with [facility] appointment for BPP on Tuesday, 4/17.  Requesting cervical check today due to history of delivery at 37w.  Cervix closed, soft and very high.  No labor signs.  S/p tdap today.  RTC 1w.  Maria Elena Box MD

## 2018-04-17 ENCOUNTER — OFFICE VISIT (OUTPATIENT)
Dept: MATERNAL FETAL MEDICINE | Facility: CLINIC | Age: 33
End: 2018-04-17
Attending: OBSTETRICS & GYNECOLOGY
Payer: COMMERCIAL

## 2018-04-17 ENCOUNTER — HOSPITAL ENCOUNTER (OUTPATIENT)
Dept: ULTRASOUND IMAGING | Facility: CLINIC | Age: 33
Discharge: HOME | End: 2018-04-17
Attending: OBSTETRICS & GYNECOLOGY | Admitting: OBSTETRICS & GYNECOLOGY
Payer: COMMERCIAL

## 2018-04-17 ENCOUNTER — PRENATAL OFFICE VISIT (OUTPATIENT)
Dept: OBGYN | Facility: CLINIC | Age: 33
End: 2018-04-17
Payer: COMMERCIAL

## 2018-04-17 VITALS
DIASTOLIC BLOOD PRESSURE: 94 MMHG | BODY MASS INDEX: 46.85 KG/M2 | SYSTOLIC BLOOD PRESSURE: 143 MMHG | WEIGHT: 239.9 LBS | TEMPERATURE: 98.3 F | HEART RATE: 106 BPM

## 2018-04-17 DIAGNOSIS — Z34.83 ENCOUNTER FOR SUPERVISION OF OTHER NORMAL PREGNANCY IN THIRD TRIMESTER: Primary | ICD-10-CM

## 2018-04-17 DIAGNOSIS — O10.919 CHRONIC HYPERTENSION IN PREGNANCY: ICD-10-CM

## 2018-04-17 DIAGNOSIS — Z13.6 CARDIOVASCULAR SCREENING; LDL GOAL LESS THAN 130: ICD-10-CM

## 2018-04-17 DIAGNOSIS — O10.919 CHRONIC HYPERTENSION AFFECTING PREGNANCY: ICD-10-CM

## 2018-04-17 DIAGNOSIS — O10.919 CHRONIC HYPERTENSION IN PREGNANCY: Primary | ICD-10-CM

## 2018-04-17 LAB
ALT SERPL W P-5'-P-CCNC: 21 U/L (ref 0–50)
AST SERPL W P-5'-P-CCNC: 15 U/L (ref 0–45)
CREAT SERPL-MCNC: 0.49 MG/DL (ref 0.52–1.04)
CREAT UR-MCNC: 93 MG/DL
ERYTHROCYTE [DISTWIDTH] IN BLOOD BY AUTOMATED COUNT: 14.7 % (ref 10–15)
GFR SERPL CREATININE-BSD FRML MDRD: >90 ML/MIN/1.7M2
HCT VFR BLD AUTO: 36.9 % (ref 35–47)
HGB BLD-MCNC: 12.1 G/DL (ref 11.7–15.7)
MCH RBC QN AUTO: 29.6 PG (ref 26.5–33)
MCHC RBC AUTO-ENTMCNC: 32.8 G/DL (ref 31.5–36.5)
MCV RBC AUTO: 90 FL (ref 78–100)
PLATELET # BLD AUTO: 310 10E9/L (ref 150–450)
PROT UR-MCNC: 0.21 G/L
PROT/CREAT 24H UR: 0.23 G/G CR (ref 0–0.2)
RBC # BLD AUTO: 4.09 10E12/L (ref 3.8–5.2)
URATE SERPL-MCNC: 3.3 MG/DL (ref 2.6–6)
WBC # BLD AUTO: 16 10E9/L (ref 4–11)

## 2018-04-17 PROCEDURE — 84460 ALANINE AMINO (ALT) (SGPT): CPT | Performed by: OBSTETRICS & GYNECOLOGY

## 2018-04-17 PROCEDURE — 76819 FETAL BIOPHYS PROFIL W/O NST: CPT

## 2018-04-17 PROCEDURE — 84156 ASSAY OF PROTEIN URINE: CPT | Performed by: OBSTETRICS & GYNECOLOGY

## 2018-04-17 PROCEDURE — 80061 LIPID PANEL: CPT | Performed by: FAMILY MEDICINE

## 2018-04-17 PROCEDURE — 83721 ASSAY OF BLOOD LIPOPROTEIN: CPT | Mod: 59 | Performed by: FAMILY MEDICINE

## 2018-04-17 PROCEDURE — 82565 ASSAY OF CREATININE: CPT | Performed by: OBSTETRICS & GYNECOLOGY

## 2018-04-17 PROCEDURE — 84450 TRANSFERASE (AST) (SGOT): CPT | Performed by: OBSTETRICS & GYNECOLOGY

## 2018-04-17 PROCEDURE — 85027 COMPLETE CBC AUTOMATED: CPT | Performed by: OBSTETRICS & GYNECOLOGY

## 2018-04-17 PROCEDURE — 36415 COLL VENOUS BLD VENIPUNCTURE: CPT | Performed by: FAMILY MEDICINE

## 2018-04-17 PROCEDURE — 84550 ASSAY OF BLOOD/URIC ACID: CPT | Performed by: OBSTETRICS & GYNECOLOGY

## 2018-04-17 PROCEDURE — 99207 ZZC PRENATAL VISIT: CPT | Performed by: OBSTETRICS & GYNECOLOGY

## 2018-04-17 NOTE — MR AVS SNAPSHOT
After Visit Summary   4/17/2018    Ruby Morrison    MRN: [id number]           Patient Information     Date Of Birth          1985        Visit Information        Provider Department      4/17/2018 4:00 PM Nhung Zimmerman,  [facility] [place]        Today's Diagnoses     Chronic hypertension in pregnancy    -  1       Follow-ups after your visit        Your next 10 appointments already scheduled     Apr 26, 2018  3:30 PM CDT   (Arrive by 3:15 PM)   REEMA BPP SINGLE with [email or web address]   [facility])    [address]  [facility] [address] [phone]            Apr 26, 2018  4:00 PM CDT   Radiology MD with UR REEMA LISA   [facility])    [address]  [facility] [address]   [phone]           Please arrive at the time given for your first appointment. This visit is used internally to schedule the physician's time during your ultrasound.            Apr 26, 2018  4:15 PM CDT   ESTABLISHED PRENATAL with Maria Elena Sterling MD   [facility]    [address] [place] [address]  [facility] [address] [phone]            May 02, 2018 11:00 AM CDT   (Arrive by 10:45 AM)   REEMA US COMPRE SINGLE F/U with [email or web address]   [facility])    [address]  [facility] [address] [phone]           Wear comfortable clothes and leave your valuables at home.            May 02, 2018 11:30 AM CDT   Radiology MD with UR REEMA LISA   [facility])    [address]  [facility] [address]   [phone]           Please arrive at the time given for your first  appointment. This visit is used internally to schedule the physician's time during your ultrasound.              Who to contact     If you have questions or need follow up information about today's clinic visit or your schedule please contact [facility] MATERNAL FETAL MEDICINE [facility] directly at [phone].  Normal or non-critical lab and imaging results will be communicated to you by MyChart, letter or phone within 4 business days after the clinic has received the results. If you do not hear from us within 7 days, please contact the clinic through [email or web address] or phone. If you have a critical or abnormal lab result, we will notify you by phone as soon as possible.  Submit refill requests through Sqrl or call your pharmacy and they will forward the refill request to us. Please allow 3 business days for your refill to be completed.          Additional Information About Your Visit        MyChart Information     Sqrl gives you secure access to your electronic health record. If you see a primary care provider, you can also send messages to your care team and make appointments. If you have questions, please call your primary care clinic.  If you do not have a primary care provider, please call [phone] and they will assist you.        Care EveryWhere ID     This is your Care EveryWhere ID. This could be used by other organizations to access your [facility] medical records  [id number]         Blood Pressure from Last 3 Encounters:   04/17/18 (!) 148/97   04/13/18 (!) 146/96   03/30/18 142/89    Weight from Last 3 Encounters:   04/17/18 108.8 kg (239 lb 14.4 oz)   04/13/18 107.5 kg (237 lb)   03/30/18 106.9 kg (235 lb 9.6 oz)              Today, you had the following     No orders found for display       Primary Care Provider Office Phone # Fax #    Mesha John [phone]       [facility] [address]        Equal Access to Services     BRIGID HWANG : Evelin no  Zariadarnell, antonyda luqadaha, qadandreta kalisbeth cornejo, tosin harley ryanjono latramainealetha nola. So [facility] [phone].    ATENCIÓN: Si mary lua, tiene a mendez disposición servicios gratuitos de asistencia lingüística. Norma al [phone].    We comply with applicable federal civil rights laws and [place] laws. We do not discriminate on the basis of race, color, national origin, age, disability, sex, sexual orientation, or gender identity.            Thank you!     Thank you for choosing [facility]  for your care. Our goal is always to provide you with excellent care. Hearing back from our patients is one way we can continue to improve our services. Please take a few minutes to complete the written survey that you may receive in the mail after your visit with us. Thank you!             Your Updated Medication List - Protect others around you: Learn how to safely use, store and throw away your medicines at www.disposemymeds.org.          This list is accurate as of 4/17/18  4:10 PM.  Always use your most recent med list.                   Brand Name Dispense Instructions for use Diagnosis    albuterol 108 (90 Base) MCG/ACT Inhaler    PROAIR HFA/PROVENTIL HFA/VENTOLIN HFA    1 Inhaler    Inhale 2 puffs into the lungs every 4 hours as needed for shortness of breath / dyspnea    Mild persistent asthma without complication       ASPIRIN PO      Take 81 mg by mouth        diclofenac 1 % Gel topical gel    VOLTAREN    100 g    Apply  2 grams to wrist area up to four times daily as needed using enclosed dosing card.    Radial styloid tenosynovitis, Right wrist pain       montelukast 10 MG tablet    SINGULAIR    90 tablet    Take 1 tablet (10 mg) by mouth At Bedtime    Mild persistent asthma without complication       order for DME     1 each    Equipment being ordered: Wrist brace    Radial styloid tenosynovitis of right hand       PRENATAL VITAMIN PO           VITAMIN D  (CHOLECALCIFEROL) PO      Take 1,000 Units by mouth daily

## 2018-04-17 NOTE — MR AVS SNAPSHOT
After Visit Summary   4/17/2018    Ruby Morrison    MRN: [id number]           Patient Information     Date Of Birth          1985        Visit Information        Provider Department      4/17/2018 3:00 PM Maria Elena Box MD [facility]        Today's Diagnoses     Encounter for supervision of other normal pregnancy in third trimester    -  1    Chronic hypertension affecting pregnancy           Follow-ups after your visit        Your next 10 appointments already scheduled     Apr 26, 2018  3:30 PM CDT   (Arrive by 3:15 PM)   REEMA BPP SINGLE with [id number]   [facility] - [facility])    [address]  [facility] [id number] [phone]            Apr 26, 2018  4:00 PM CDT   Radiology MD with UR REEMA LISA   [facility])    [address]  [facility] [address] [phone]           Please arrive at the time given for your first appointment. This visit is used internally to schedule the physician's time during your ultrasound.            Apr 26, 2018  4:15 PM CDT   ESTABLISHED PRENATAL with Maria Elena Sterling MD   [facility] ([facility])    [address] [place] [address]  [facility] [address] [phone]            May 02, 2018 11:00 AM CDT   (Arrive by 10:45 AM)   REEMA US COMPRE SINGLE F/U with [id number]   [facility] [place] ([facility])    [address]  [facility] [address] [phone]           Wear comfortable clothes and leave your valuables at home.            May 02, 2018 11:30 AM CDT   Radiology MD with UR REEMA LISA   [facility])    [address]  [facility] [address]    [phone]           Please arrive at the time given for your first appointment. This visit is used internally to schedule the physician's time during your ultrasound.              Who to contact     If you have questions or need follow up information about today's clinic visit or your schedule please contact [facility] directly at [phone].  Normal or non-critical lab and imaging results will be communicated to you by MyChart, letter or phone within 4 business days after the clinic has received the results. If you do not hear from us within 7 days, please contact the clinic through [email or web address] or phone. If you have a critical or abnormal lab result, we will notify you by phone as soon as possible.  Submit refill requests through [employer] or call your pharmacy and they will forward the refill request to us. Please allow 3 business days for your refill to be completed.          Additional Information About Your Visit        MyChart Information     [employer] gives you secure access to your electronic health record. If you see a primary care provider, you can also send messages to your care team and make appointments. If you have questions, please call your primary care clinic.  If you do not have a primary care provider, please call [phone] and they will assist you.        Care EveryWhere ID     This is your Care EveryWhere ID. This could be used by other organizations to access your [place] medical records  [id number]        Your Vitals Were     Pulse Temperature BMI (Body Mass Index)             106 98.3  F (36.8  C) (Oral) 46.85 kg/m2          Blood Pressure from Last 3 Encounters:   04/17/18 (!) 148/97   04/13/18 (!) 146/96   03/30/18 142/89    Weight from Last 3 Encounters:   04/17/18 239 lb 14.4 oz (108.8 kg)   04/13/18 237 lb (107.5 kg)   03/30/18 235 lb 9.6 oz (106.9 kg)              We Performed the Following     ALT     AST     CBC with platelets     Creatinine     Protein  random  urine with Creat Ratio     Uric acid        Primary Care Provider Office Phone # Fax #    Mesha John [phone]       [address] [facility] [address]        Equal Access to Services     BRIGID HWANG : Hadii aad ku hadsukhio Soboali, waaxda luqadaha, qaybta kaalmada adeegyada, tosin alarconn cricket toscano ria vaca. So [facility] [phone].    ATENCIÓN: Si habla español, tiene a mendez disposición servicios gratuitos de asistencia lingüística. Llame al [phone].    We comply with applicable federal civil rights laws and [place] laws. We do not discriminate on the basis of race, color, national origin, age, disability, sex, sexual orientation, or gender identity.            Thank you!     Thank you for choosing [facility]  for your care. Our goal is always to provide you with excellent care. Hearing back from our patients is one way we can continue to improve our services. Please take a few minutes to complete the written survey that you may receive in the mail after your visit with us. Thank you!             Your Updated Medication List - Protect others around you: Learn how to safely use, store and throw away your medicines at www.disposemymeds.org.          This list is accurate as of 4/17/18  5:15 PM.  Always use your most recent med list.                   Brand Name Dispense Instructions for use Diagnosis    albuterol 108 (90 Base) MCG/ACT Inhaler    PROAIR HFA/PROVENTIL HFA/VENTOLIN HFA    1 Inhaler    Inhale 2 puffs into the lungs every 4 hours as needed for shortness of breath / dyspnea    Mild persistent asthma without complication       ASPIRIN PO      Take 81 mg by mouth        diclofenac 1 % Gel topical gel    VOLTAREN    100 g    Apply  2 grams to wrist area up to four times daily as needed using enclosed dosing card.    Radial styloid tenosynovitis, Right wrist pain       montelukast 10 MG tablet    SINGULAIR    90 tablet    Take 1 tablet (10 mg) by mouth  At Bedtime    Mild persistent asthma without complication       order for DME     1 each    Equipment being ordered: Wrist brace    Radial styloid tenosynovitis of right hand       PRENATAL VITAMIN PO           VITAMIN D (CHOLECALCIFEROL) PO      Take 1,000 Units by mouth daily

## 2018-04-17 NOTE — LETTER
April 17, 2018      Ruby Morrison  [address] [facility] [address]        To Whom It May Concern:    Ruby Morrison was seen in our clinic. She may work with the following restrictions:  no more than 8 hour shifts, no more than 40 hours/week and limit overnight shifts to no more than 1 per week.      Sincerely,        Maria Elena Box MD

## 2018-04-17 NOTE — PROGRESS NOTES
33w1d  Reporting HA somewhat different than normal today.  Has not taken Tylenol because it doesn't usually work for her.  Has checked blood pressure at work and it's very similar to what we get here in clinic.  Had planned to send to L&D for evaluation, but L&D full.  Discussed with patient.  Will get stat labs here in clinic and one more BP reading.  If normal, patient will go home and await call with lab results.  Took tylenol shortly after our visit, so will see if that improves headache as well.  Review symptoms of severe pre-eclampsia, so she is aware to let us know how she's feeling.  RTC 1w  Maria Elena Box MD

## 2018-04-17 NOTE — LETTER
[address] [address]  [phone]                                                                                                April 27, 2018    Ruby Morrison  [address] [facility] [address]        Dear Ms. Morrison,      Please follow up to discuss your lab with your provider     Take care,      Lorenzo Brown DO/hl    Results for orders placed or performed in visit on 04/17/18   Lipid panel reflex to direct LDL   Result Value Ref Range    Cholesterol 203 (H) <200 mg/dL    Triglycerides 461 (H) <150 mg/dL    HDL Cholesterol 46 (L) >49 mg/dL    LDL Cholesterol Calculated  <100 mg/dL     Cannot estimate LDL when triglyceride exceeds 400 mg/dL    Non HDL Cholesterol 157 (H) <130 mg/dL   LDL cholesterol direct   Result Value Ref Range    LDL Cholesterol Direct 120 (H) <100 mg/dL

## 2018-04-19 LAB
CHOLEST SERPL-MCNC: 203 MG/DL
HDLC SERPL-MCNC: 46 MG/DL
LDLC SERPL CALC-MCNC: ABNORMAL MG/DL
LDLC SERPL DIRECT ASSAY-MCNC: 120 MG/DL
NONHDLC SERPL-MCNC: 157 MG/DL
TRIGL SERPL-MCNC: 461 MG/DL

## 2018-04-26 ENCOUNTER — OFFICE VISIT (OUTPATIENT)
Dept: MATERNAL FETAL MEDICINE | Facility: CLINIC | Age: 33
End: 2018-04-26
Attending: OBSTETRICS & GYNECOLOGY
Payer: COMMERCIAL

## 2018-04-26 ENCOUNTER — PRENATAL OFFICE VISIT (OUTPATIENT)
Dept: OBGYN | Facility: CLINIC | Age: 33
End: 2018-04-26
Payer: COMMERCIAL

## 2018-04-26 ENCOUNTER — HOSPITAL ENCOUNTER (OUTPATIENT)
Dept: ULTRASOUND IMAGING | Facility: CLINIC | Age: 33
Discharge: HOME | End: 2018-04-26
Attending: OBSTETRICS & GYNECOLOGY | Admitting: OBSTETRICS & GYNECOLOGY
Payer: COMMERCIAL

## 2018-04-26 VITALS
HEART RATE: 94 BPM | SYSTOLIC BLOOD PRESSURE: 146 MMHG | DIASTOLIC BLOOD PRESSURE: 98 MMHG | TEMPERATURE: 98.4 F | WEIGHT: 230 LBS | BODY MASS INDEX: 44.92 KG/M2

## 2018-04-26 DIAGNOSIS — O10.919 CHRONIC HYPERTENSION AFFECTING PREGNANCY: Primary | ICD-10-CM

## 2018-04-26 DIAGNOSIS — O10.919 CHRONIC HYPERTENSION IN PREGNANCY: Primary | ICD-10-CM

## 2018-04-26 DIAGNOSIS — O10.919 CHRONIC HYPERTENSION IN PREGNANCY: ICD-10-CM

## 2018-04-26 DIAGNOSIS — Z34.83 ENCOUNTER FOR SUPERVISION OF OTHER NORMAL PREGNANCY IN THIRD TRIMESTER: ICD-10-CM

## 2018-04-26 PROCEDURE — 84156 ASSAY OF PROTEIN URINE: CPT | Performed by: OBSTETRICS & GYNECOLOGY

## 2018-04-26 PROCEDURE — 99207 ZZC PRENATAL VISIT: CPT | Performed by: OBSTETRICS & GYNECOLOGY

## 2018-04-26 PROCEDURE — 76819 FETAL BIOPHYS PROFIL W/O NST: CPT

## 2018-04-26 NOTE — MR AVS SNAPSHOT
After Visit Summary   4/26/2018    Ruby Morrison    MRN: [id number]           Patient Information     Date Of Birth          1985        Visit Information        Provider Department      4/26/2018 4:00 PM Bouchra Greco DO [facility] Maternal Fetal Medicine [facility]        Today's Diagnoses     Chronic hypertension in pregnancy    -  1       Follow-ups after your visit        Your next 10 appointments already scheduled     May 02, 2018 11:00 AM CDT   (Arrive by 10:45 AM)   MFM US COMPRE SINGLE F/U with [id number]   [facility] Maternal Fetal Medicine Ultrasound - [place] ([facility])    [address]  [facility] [address] [phone]           Wear comfortable clothes and leave your valuables at home.            May 02, 2018 11:30 AM CDT   Radiology MD with UR MFNE LISA   [facility] Maternal Fetal Medicine - [facility])    [address]  [facility] [address] [phone]           Please arrive at the time given for your first appointment. This visit is used internally to schedule the physician's time during your ultrasound.            May 03, 2018 11:30 AM CDT   ESTABLISHED PRENATAL with Maria Elena Sterling MD   [facility] ([facility])    [address] [place] [address] [phone]              Who to contact     If you have questions or need follow up information about today's clinic visit or your schedule please contact [facility] directly at [phone].  Normal or non-critical lab and imaging results will be communicated to you by MyChart, letter or phone within 4 business days after the clinic has received the results. If you do not hear from us within 7 days, please contact the clinic through MyChart or phone. If you have a critical or abnormal lab result, we will notify you by  phone as soon as possible.  Submit refill requests through [employer] or call your pharmacy and they will forward the refill request to us. Please allow 3 business days for your refill to be completed.          Additional Information About Your Visit        [employer] Information     [employer] gives you secure access to your electronic health record. If you see a primary care provider, you can also send messages to your care team and make appointments. If you have questions, please call your primary care clinic.  If you do not have a primary care provider, please call [phone] and they will assist you.        Care EveryWhere ID     This is your Care EveryWhere ID. This could be used by other organizations to access your [place] medical records  [id number]         Blood Pressure from Last 3 Encounters:   04/26/18 (!) 146/98   04/17/18 (!) 143/94   04/13/18 (!) 146/96    Weight from Last 3 Encounters:   04/26/18 104.3 kg (230 lb)   04/17/18 108.8 kg (239 lb 14.4 oz)   04/13/18 107.5 kg (237 lb)              Today, you had the following     No orders found for display       Primary Care Provider Office Phone # Fax #    Mesha John [phone]       [facility] [address]        Equal Access to Services     BRIGID HWANG AH: Hadii ivy orellana hadasho Soomaali, waaxda luqadaha, qaybta kaalmada adeegyada, tosin amaya hayvictor manuel vaca. So [facility] [phone].    ATENCIÓN: Si habla español, tiene a mendez disposición servicios gratuitos de asistencia lingüística. Llame al [phone].    We comply with applicable federal civil rights laws and [place] laws. We do not discriminate on the basis of race, color, national origin, age, disability, sex, sexual orientation, or gender identity.            Thank you!     Thank you for choosing [facility]  for your care. Our goal is always to provide you with excellent care. Hearing back from our patients is one  way we can continue to improve our services. Please take a few minutes to complete the written survey that you may receive in the mail after your visit with us. Thank you!             Your Updated Medication List - Protect others around you: Learn how to safely use, store and throw away your medicines at www.disposemymeds.org.          This list is accurate as of 4/26/18  5:09 PM.  Always use your most recent med list.                   Brand Name Dispense Instructions for use Diagnosis    albuterol 108 (90 Base) MCG/ACT Inhaler    PROAIR HFA/PROVENTIL HFA/VENTOLIN HFA    1 Inhaler    Inhale 2 puffs into the lungs every 4 hours as needed for shortness of breath / dyspnea    Mild persistent asthma without complication       ASPIRIN PO      Take 81 mg by mouth        diclofenac 1 % Gel topical gel    VOLTAREN    100 g    Apply  2 grams to wrist area up to four times daily as needed using enclosed dosing card.    Radial styloid tenosynovitis, Right wrist pain       montelukast 10 MG tablet    SINGULAIR    90 tablet    Take 1 tablet (10 mg) by mouth At Bedtime    Mild persistent asthma without complication       order for DME     1 each    Equipment being ordered: Wrist brace    Radial styloid tenosynovitis of right hand       PRENATAL VITAMIN PO           VITAMIN D (CHOLECALCIFEROL) PO      Take 1,000 Units by mouth daily

## 2018-04-26 NOTE — MR AVS SNAPSHOT
After Visit Summary   4/26/2018    Ruby Morrison    MRN: [id number]           Patient Information     Date Of Birth          1985        Visit Information        Provider Department      4/26/2018 4:15 PM Maria Elena Sterling MD [facility]        Today's Diagnoses     Chronic hypertension affecting pregnancy    -  1    Encounter for supervision of other normal pregnancy in third trimester           Follow-ups after your visit        Follow-up notes from your care team     Return in about 1 week (around 5/3/2018).      Your next 10 appointments already scheduled     May 02, 2018 11:00 AM CDT   (Arrive by 10:45 AM)   MFM US COMPRE SINGLE F/U with [id number]   [facility])    [address]  [facility] [address] [phone]           Wear comfortable clothes and leave your valuables at home.            May 02, 2018 11:30 AM CDT   Radiology MD with UR NE LISA   [facility])    [address]  [facility] [address] [phone]           Please arrive at the time given for your first appointment. This visit is used internally to schedule the physician's time during your ultrasound.            May 03, 2018 11:30 AM CDT   ESTABLISHED PRENATAL with Maria Elena Sterling MD   [facility] ([facility])    [address] [place] [address] [phone]              Who to contact     If you have questions or need follow up information about today's clinic visit or your schedule please contact [facility] directly at [phone].  Normal or non-critical lab and imaging results will be communicated to you by MyChart, letter or phone within 4 business days after the clinic has received the results. If you do not hear from us  within 7 days, please contact the clinic through [employer] or phone. If you have a critical or abnormal lab result, we will notify you by phone as soon as possible.  Submit refill requests through [employer] or call your pharmacy and they will forward the refill request to us. Please allow 3 business days for your refill to be completed.          Additional Information About Your Visit        [employer] Information     [employer] gives you secure access to your electronic health record. If you see a primary care provider, you can also send messages to your care team and make appointments. If you have questions, please call your primary care clinic.  If you do not have a primary care provider, please call [phone] and they will assist you.        Care EveryWhere ID     This is your Care EveryWhere ID. This could be used by other organizations to access your [place] medical records  [id number]        Your Vitals Were     Pulse Temperature BMI (Body Mass Index)             94 98.4  F (36.9  C) (Oral) 44.92 kg/m2          Blood Pressure from Last 3 Encounters:   04/26/18 (!) 146/98   04/17/18 (!) 143/94   04/13/18 (!) 146/96    Weight from Last 3 Encounters:   04/26/18 230 lb (104.3 kg)   04/17/18 239 lb 14.4 oz (108.8 kg)   04/13/18 237 lb (107.5 kg)              We Performed the Following     Creatinine urine calculation only     Protein  random urine with Creat Ratio        Primary Care Provider Office Phone # Fax #    Mesha John  [phone]       [facility] [address]        Equal Access to Services     BRIGDI HWANG : Hadii aad ku hadasho Soomaali, waaxda luqadaha, qaybta kaalmada adeegyasp, tosin rollins . So [facility] [phone].    ATENCIÓN: Si habla español, tiene a mendez disposición servicios gratuitos de asistencia lingüística. Llame al [phone].    We comply with applicable federal civil rights laws and [place] laws. We do not discriminate on the  basis of race, color, national origin, age, disability, sex, sexual orientation, or gender identity.            Thank you!     Thank you for choosing [facility]  for your care. Our goal is always to provide you with excellent care. Hearing back from our patients is one way we can continue to improve our services. Please take a few minutes to complete the written survey that you may receive in the mail after your visit with us. Thank you!             Your Updated Medication List - Protect others around you: Learn how to safely use, store and throw away your medicines at www.disposemymeds.org.          This list is accurate as of 4/26/18  6:30 PM.  Always use your most recent med list.                   Brand Name Dispense Instructions for use Diagnosis    albuterol 108 (90 Base) MCG/ACT Inhaler    PROAIR HFA/PROVENTIL HFA/VENTOLIN HFA    1 Inhaler    Inhale 2 puffs into the lungs every 4 hours as needed for shortness of breath / dyspnea    Mild persistent asthma without complication       ASPIRIN PO      Take 81 mg by mouth        diclofenac 1 % Gel topical gel    VOLTAREN    100 g    Apply  2 grams to wrist area up to four times daily as needed using enclosed dosing card.    Radial styloid tenosynovitis, Right wrist pain       montelukast 10 MG tablet    SINGULAIR    90 tablet    Take 1 tablet (10 mg) by mouth At Bedtime    Mild persistent asthma without complication       order for DME     1 each    Equipment being ordered: Wrist brace    Radial styloid tenosynovitis of right hand       PRENATAL VITAMIN PO           VITAMIN D (CHOLECALCIFEROL) PO      Take 1,000 Units by mouth daily

## 2018-04-26 NOTE — PROGRESS NOTES
34w3d  GI bug earlier this week, lots of vomiting. Back to normal now.  BPs 140s/90s at home. Initial BP elevated 146/102, repeat 146/98. Denies headache, visual change, RUQ pain. No edema. Urine prot/creat ratio today. Reviewed s/sx pre-eclampsia. Active fetal movement. No contractions. Requests SCE today.   US today: cephalic, FHR 131bpm, DAJA 20.9cm with MVP 6.7cm, BPP 8/8.   RTC weekly.  Maria Elena Sterling MD

## 2018-04-27 LAB
CREAT UR-MCNC: 113 MG/DL
PROT UR-MCNC: 0.31 G/L
PROT/CREAT 24H UR: 0.28 G/G CR (ref 0–0.2)

## 2018-05-02 ENCOUNTER — HOSPITAL ENCOUNTER (OUTPATIENT)
Dept: ULTRASOUND IMAGING | Facility: CLINIC | Age: 33
Discharge: HOME | End: 2018-05-02
Attending: OBSTETRICS & GYNECOLOGY | Admitting: OBSTETRICS & GYNECOLOGY
Payer: COMMERCIAL

## 2018-05-02 ENCOUNTER — OFFICE VISIT (OUTPATIENT)
Dept: MATERNAL FETAL MEDICINE | Facility: CLINIC | Age: 33
End: 2018-05-02
Attending: OBSTETRICS & GYNECOLOGY
Payer: COMMERCIAL

## 2018-05-02 ENCOUNTER — PRENATAL OFFICE VISIT (OUTPATIENT)
Dept: OBGYN | Facility: CLINIC | Age: 33
End: 2018-05-02
Payer: COMMERCIAL

## 2018-05-02 VITALS
BODY MASS INDEX: 46.15 KG/M2 | DIASTOLIC BLOOD PRESSURE: 92 MMHG | TEMPERATURE: 98 F | SYSTOLIC BLOOD PRESSURE: 144 MMHG | WEIGHT: 236.3 LBS

## 2018-05-02 DIAGNOSIS — Z34.83 ENCOUNTER FOR SUPERVISION OF OTHER NORMAL PREGNANCY IN THIRD TRIMESTER: Primary | ICD-10-CM

## 2018-05-02 DIAGNOSIS — O10.919 CHRONIC HYPERTENSION IN PREGNANCY: Primary | ICD-10-CM

## 2018-05-02 DIAGNOSIS — O10.919 CHRONIC HYPERTENSION IN PREGNANCY: ICD-10-CM

## 2018-05-02 PROCEDURE — 76816 OB US FOLLOW-UP PER FETUS: CPT

## 2018-05-02 PROCEDURE — 99207 ZZC PRENATAL VISIT: CPT | Performed by: OBSTETRICS & GYNECOLOGY

## 2018-05-02 PROCEDURE — 87186 SC STD MICRODIL/AGAR DIL: CPT | Performed by: OBSTETRICS & GYNECOLOGY

## 2018-05-02 PROCEDURE — 76819 FETAL BIOPHYS PROFIL W/O NST: CPT | Performed by: OBSTETRICS & GYNECOLOGY

## 2018-05-02 PROCEDURE — 87653 STREP B DNA AMP PROBE: CPT | Performed by: OBSTETRICS & GYNECOLOGY

## 2018-05-02 NOTE — PROGRESS NOTES
"Please see \"Imaging\" tab under \"Chart Review\" for details of today's ultrasound.    Vinicius Randle M.D.  Specialist in Maternal-Fetal Medicine     "

## 2018-05-02 NOTE — MR AVS SNAPSHOT
After Visit Summary   5/2/2018    Ruby Morrison    MRN: [id number]           Patient Information     Date Of Birth          1985        Visit Information        Provider Department      5/2/2018 11:30 AM Vinicius Randle MD [facility]        Today's Diagnoses     Chronic hypertension in pregnancy    -  1       Follow-ups after your visit        Your next 10 appointments already scheduled     May 03, 2018 11:30 AM CDT   ESTABLISHED PRENATAL with Maria Elena Sterling MD   [facility]    [address] [facility] [address]  [facility] [address]   [phone]            May 09, 2018  3:30 PM CDT   MFM BPP SINGLE with [id number]   [facility])    [address]  [facility] [address] [phone]            May 09, 2018  4:00 PM CDT   Radiology MD with SALONI BENAVIDEZ MD   [facility])    [address]  [facility] [address]   [phone]           Please arrive at the time given for your first appointment. This visit is used internally to schedule the physician's time during your ultrasound.            May 11, 2018  9:00 AM CDT   ESTABLISHED PRENATAL with Tomasa Bernal MD   [facility]    [address] [facility] [address]  [facility] [address] [phone]            May 16, 2018 11:45 AM CDT   MFM BPP SINGLE with [id number]   [facility])    [address]  [facility] [address]   [phone]            May 16, 2018 12:15 PM CDT   Radiology MD with SALONI BENAVIDEZ MD   [facility])    [address]  [facility] [place] [address]   [phone]           Please arrive at the time given for your first appointment. This visit is used internally to schedule the physician's time during your ultrasound.              Future tests that were ordered for you today     Open Future Orders        Priority Expected Expires Ordered    M BPP Single Routine  3/2/2019 5/2/2018    MFM BPP Single Routine  3/2/2019 5/2/2018            Who to contact     If you have questions or need follow up information about today's clinic visit or your schedule please contact [facility] MATERNAL FETAL MEDIC[facility] directly at [phone].  Normal or non-critical lab and imaging results will be communicated to you by [employer], letter or phone within 4 business days after the clinic has received the results. If you do not hear from us within 7 days, please contact the clinic through iwocat or phone. If you have a critical or abnormal lab result, we will notify you by phone as soon as possible.  Submit refill requests through [employer] or call your pharmacy and they will forward the refill request to us. Please allow 3 business days for your refill to be completed.          Additional Information About Your Visit        [employer] Information     [employer] gives you secure access to your electronic health record. If you see a primary care provider, you can also send messages to your care team and make appointments. If you have questions, please call your primary care clinic.  If you do not have a primary care provider, please call [phone] and they will assist you.        Care EveryWhere ID     This is your Care EveryWhere ID. This could be used by other organizations to access your [place] medical records  [id number]         Blood Pressure from Last 3 Encounters:   05/02/18 (!) 144/92   04/26/18 (!) 146/98   04/17/18 (!) 143/94    Weight from Last 3 Encounters:   05/02/18 107.2 kg (236 lb 4.8 oz)   04/26/18 104.3 kg (230 lb)   04/17/18 108.8 kg  (239 lb 14.4 oz)               Primary Care Provider Office Phone # Fax #    Mesha John [phone]       [address] [facility] [address]        Equal Access to Services     BRIGID HWANG : Hadii aad ku hadsukhio Soboali, waaxda luqadaha, qaybta kaalmada adeegyada, tosin alarconaletha ortizyovani toscano ria vaca. So [facility] [phone].    ATENCIÓN: Si habla español, tiene a mendez disposición servicios gratuitos de asistencia lingüística. Llame al [phone].    We comply with applicable federal civil rights laws and [place] laws. We do not discriminate on the basis of race, color, national origin, age, disability, sex, sexual orientation, or gender identity.            Thank you!     Thank you for choosing [facility]  for your care. Our goal is always to provide you with excellent care. Hearing back from our patients is one way we can continue to improve our services. Please take a few minutes to complete the written survey that you may receive in the mail after your visit with us. Thank you!             Your Updated Medication List - Protect others around you: Learn how to safely use, store and throw away your medicines at www.disposemymeds.org.          This list is accurate as of 5/2/18  1:08 PM.  Always use your most recent med list.                   Brand Name Dispense Instructions for use Diagnosis    albuterol 108 (90 Base) MCG/ACT Inhaler    PROAIR HFA/PROVENTIL HFA/VENTOLIN HFA    1 Inhaler    Inhale 2 puffs into the lungs every 4 hours as needed for shortness of breath / dyspnea    Mild persistent asthma without complication       ASPIRIN PO      Take 81 mg by mouth        diclofenac 1 % Gel topical gel    VOLTAREN    100 g    Apply  2 grams to wrist area up to four times daily as needed using enclosed dosing card.    Radial styloid tenosynovitis, Right wrist pain       montelukast 10 MG tablet    SINGULAIR    90 tablet    Take 1 tablet (10 mg) by  mouth At Bedtime    Mild persistent asthma without complication       order for DME     1 each    Equipment being ordered: Wrist brace    Radial styloid tenosynovitis of right hand       PRENATAL VITAMIN PO           VITAMIN D (CHOLECALCIFEROL) PO      Take 1,000 Units by mouth daily

## 2018-05-02 NOTE — MR AVS SNAPSHOT
After Visit Summary   5/2/2018    Ruby Morrison    MRN: [id number]           Patient Information     Date Of Birth          1985        Visit Information        Provider Department      5/2/2018 10:30 AM Estrella Stiles MD [facility]        Today's Diagnoses     Encounter for supervision of other normal pregnancy in third trimester    -  1       Follow-ups after your visit        Your next 10 appointments already scheduled     May 03, 2018 11:30 AM CDT   ESTABLISHED PRENATAL with Maria Elena Sterling MD   [facility] ([facility])    [address] [facility] [address]  [facility] [address]   [phone]            May 09, 2018  3:30 PM CDT   MFM BPP SINGLE with [id number]   [facility])    [address]  [facility] [address]   [phone]            May 09, 2018  4:00 PM CDT   Radiology MD with SALONI BENAVIDEZ MD   [facility])    [address]  [facility] [address]   [phone]           Please arrive at the time given for your first appointment. This visit is used internally to schedule the physician's time during your ultrasound.            May 11, 2018  9:00 AM CDT   ESTABLISHED PRENATAL with Tomasa Bernal MD   [facility] ([facility])    [address] [facility] [address]  [facility] [address] [phone]            May 16, 2018 11:45 AM CDT   MFM BPP SINGLE with [id number]   [facility])    [address]  [facility] [address]   [phone]            May 16, 2018 12:15 PM CDT   Radiology MD with SALONI BENAVIDEZ MD   [facility])    [address]  [facility] [place] [address]   [phone]           Please arrive at the time given for your first appointment. This visit is used internally to schedule the physician's time during your ultrasound.              Future tests that were ordered for you today     Open Future Orders        Priority Expected Expires Ordered    MFM BPP Single Routine  3/2/2019 5/2/2018    MFM BPP Single Routine  3/2/2019 5/2/2018            Who to contact     If you have questions or need follow up information about today's clinic visit or your schedule please contact [facility] directly at [phone].  Normal or non-critical lab and imaging results will be communicated to you by [employer], letter or phone within 4 business days after the clinic has received the results. If you do not hear from us within 7 days, please contact the clinic through [employer] or phone. If you have a critical or abnormal lab result, we will notify you by phone as soon as possible.  Submit refill requests through [employer] or call your pharmacy and they will forward the refill request to us. Please allow 3 business days for your refill to be completed.          Additional Information About Your Visit        MyChart Information     [employer] gives you secure access to your electronic health record. If you see a primary care provider, you can also send messages to your care team and make appointments. If you have questions, please call your primary care clinic.  If you do not have a primary care provider, please call [phone] and they will assist you.        Care EveryWhere ID     This is your Care EveryWhere ID. This could be used by other organizations to access your [place] medical records  [id number]        Your Vitals Were     Temperature BMI (Body Mass Index)                98  F (36.7  C) (Oral) 46.15 kg/m2           Blood Pressure from Last 3 Encounters:   05/02/18 (!) 144/92   04/26/18 (!) 146/98   04/17/18 (!) 143/94     Weight from Last 3 Encounters:   05/02/18 236 lb 4.8 oz (107.2 kg)   04/26/18 230 lb (104.3 kg)   04/17/18 239 lb 14.4 oz (108.8 kg)              We Performed the Following     Group B strep PCR        Primary Care Provider Office Phone # Fax #    Mesha John [phone]       [address] [facility] [address]        Equal Access to Services     BRIGID HWANG : Hadii aad ku hadasho Soomaali, waaxda luqadaha, qaybta kaalmada adeegyada, waxay idiin hayaan adeeg everton rollins . So [facility] [place] [facility] [phone].    ATENCIÓN: Si habla español, tiene a mendez disposición servicios gratuitos de asistencia lingüística. [facility] [phone].    We comply with applicable federal civil rights laws and [place] laws. We do not discriminate on the basis of race, color, national origin, age, disability, sex, sexual orientation, or gender identity.            Thank you!     Thank you for choosing [facility]  for your care. Our goal is always to provide you with excellent care. Hearing back from our patients is one way we can continue to improve our services. Please take a few minutes to complete the written survey that you may receive in the mail after your visit with us. Thank you!             Your Updated Medication List - Protect others around you: Learn how to safely use, store and throw away your medicines at www.disposemymeds.org.          This list is accurate as of 5/2/18  1:41 PM.  Always use your most recent med list.                   Brand Name Dispense Instructions for use Diagnosis    albuterol 108 (90 Base) MCG/ACT Inhaler    PROAIR HFA/PROVENTIL HFA/VENTOLIN HFA    1 Inhaler    Inhale 2 puffs into the lungs every 4 hours as needed for shortness of breath / dyspnea    Mild persistent asthma without complication       ASPIRIN PO      Take 81 mg by mouth        diclofenac 1 % Gel topical gel    VOLTAREN    100 g    Apply  2 grams to wrist area up to four times daily as needed using  enclosed dosing card.    Radial styloid tenosynovitis, Right wrist pain       montelukast 10 MG tablet    SINGULAIR    90 tablet    Take 1 tablet (10 mg) by mouth At Bedtime    Mild persistent asthma without complication       order for DME     1 each    Equipment being ordered: Wrist brace    Radial styloid tenosynovitis of right hand       PRENATAL VITAMIN PO           VITAMIN D (CHOLECALCIFEROL) PO      Take 1,000 Units by mouth daily

## 2018-05-02 NOTE — PROGRESS NOTES
35w2d Feeling good, no c/o.  Good fm.  No ctx that she can tell.  Denies ha, scotomata, RUQ pain.  Has us today with MFM.  GBS today, will do hemoglobin next visit.  RTC weekly  andie

## 2018-05-03 LAB
GP B STREP DNA SPEC QL NAA+PROBE: POSITIVE
SPECIMEN SOURCE: ABNORMAL

## 2018-05-06 LAB
BACTERIA SPEC CULT: ABNORMAL
SPECIMEN SOURCE: ABNORMAL

## 2018-05-09 ENCOUNTER — HOSPITAL ENCOUNTER (OUTPATIENT)
Dept: ULTRASOUND IMAGING | Facility: CLINIC | Age: 33
Discharge: HOME | End: 2018-05-09
Attending: OBSTETRICS & GYNECOLOGY | Admitting: OBSTETRICS & GYNECOLOGY
Payer: COMMERCIAL

## 2018-05-09 ENCOUNTER — OFFICE VISIT (OUTPATIENT)
Dept: MATERNAL FETAL MEDICINE | Facility: CLINIC | Age: 33
End: 2018-05-09
Attending: OBSTETRICS & GYNECOLOGY
Payer: COMMERCIAL

## 2018-05-09 DIAGNOSIS — O10.919 CHRONIC HYPERTENSION IN PREGNANCY: ICD-10-CM

## 2018-05-09 DIAGNOSIS — O10.919 CHRONIC HYPERTENSION AFFECTING PREGNANCY: Primary | ICD-10-CM

## 2018-05-09 PROCEDURE — 76819 FETAL BIOPHYS PROFIL W/O NST: CPT

## 2018-05-09 NOTE — MR AVS SNAPSHOT
After Visit Summary   5/9/2018    Ruby Morrison    MRN: [id number]           Patient Information     Date Of Birth          1985        Visit Information        Provider Department      5/9/2018 4:00 PM Holley Cordova MD [facility] Maternal Fetal Medicine [facility]        Today's Diagnoses     Chronic hypertension affecting pregnancy    -  1       Follow-ups after your visit        Your next 10 appointments already scheduled     May 11, 2018  9:00 AM CDT   ESTABLISHED PRENATAL with Tomasa Bernal MD   [facility]    [address]  [facility] [address] [phone]            May 16, 2018 11:45 AM CDT   [facility] BPP SINGLE with [id number]   [facility] Maternal Fetal Medicine Ultrasound [facility])    [address]  [facility] [address] [phone]            May 16, 2018 12:15 PM CDT   Radiology MD with UR REEMA LISA   [facility] Maternal Fetal Medicine [facility])    [address]  [facility] [address]   [phone]           Please arrive at the time given for your first appointment. This visit is used internally to schedule the physician's time during your ultrasound.              Future tests that were ordered for you today     Open Future Orders        Priority Expected Expires Ordered    M BPP Single Routine 5/23/2018 3/9/2019 5/9/2018    [facility] US Comprehensive Single F/U Routine 5/30/2018 5/9/2019 5/9/2018            Who to contact     If you have questions or need follow up information about today's clinic visit or your schedule please contact [facility]ERNAL FETAL MEDIC[facility] directly at [phone].  Normal or non-critical lab and imaging results will be communicated to you by MyChart, letter or phone within 4 business days after the clinic has received the results. If you do not hear from  us within 7 days, please contact the clinic through APR or phone. If you have a critical or abnormal lab result, we will notify you by phone as soon as possible.  Submit refill requests through APR or call your pharmacy and they will forward the refill request to us. Please allow 3 business days for your refill to be completed.          Additional Information About Your Visit        [employer] Information     APR gives you secure access to your electronic health record. If you see a primary care provider, you can also send messages to your care team and make appointments. If you have questions, please call your primary care clinic.  If you do not have a primary care provider, please call [phone] and they will assist you.        Care EveryWhere ID     This is your Care EveryWhere ID. This could be used by other organizations to access your [place] medical records  [id number]         Blood Pressure from Last 3 Encounters:   05/02/18 (!) 144/92   04/26/18 (!) 146/98   04/17/18 (!) 143/94    Weight from Last 3 Encounters:   05/02/18 107.2 kg (236 lb 4.8 oz)   04/26/18 104.3 kg (230 lb)   04/17/18 108.8 kg (239 lb 14.4 oz)               Primary Care Provider Office Phone # Fax #    Mesha John [phone]       [facility] [facility] [address]        Equal Access to Services     BRIGID HWANG AH: Hadii ivy ku hadasho Soomaali, waaxda luqadaha, qaybta kaalmada adeegyada, tosin rollins . So [facility] [phone].    ATENCIÓN: Si habla español, tiene a mendez disposición servicios gratuitos de asistencia lingüística. Llame al [phone].    We comply with applicable federal civil rights laws and [place] laws. We do not discriminate on the basis of race, color, national origin, age, disability, sex, sexual orientation, or gender identity.            Thank you!     Thank you for choosing [facility]  for your care. Our goal is  always to provide you with excellent care. Hearing back from our patients is one way we can continue to improve our services. Please take a few minutes to complete the written survey that you may receive in the mail after your visit with us. Thank you!             Your Updated Medication List - Protect others around you: Learn how to safely use, store and throw away your medicines at www.disposemymeds.org.          This list is accurate as of 5/9/18  4:09 PM.  Always use your most recent med list.                   Brand Name Dispense Instructions for use Diagnosis    albuterol 108 (90 Base) MCG/ACT Inhaler    PROAIR HFA/PROVENTIL HFA/VENTOLIN HFA    1 Inhaler    Inhale 2 puffs into the lungs every 4 hours as needed for shortness of breath / dyspnea    Mild persistent asthma without complication       ASPIRIN PO      Take 81 mg by mouth        diclofenac 1 % Gel topical gel    VOLTAREN    100 g    Apply  2 grams to wrist area up to four times daily as needed using enclosed dosing card.    Radial styloid tenosynovitis, Right wrist pain       montelukast 10 MG tablet    SINGULAIR    90 tablet    Take 1 tablet (10 mg) by mouth At Bedtime    Mild persistent asthma without complication       order for DME     1 each    Equipment being ordered: Wrist brace    Radial styloid tenosynovitis of right hand       PRENATAL VITAMIN PO           VITAMIN D (CHOLECALCIFEROL) PO      Take 1,000 Units by mouth daily

## 2018-05-11 ENCOUNTER — PRENATAL OFFICE VISIT (OUTPATIENT)
Dept: OBGYN | Facility: CLINIC | Age: 33
End: 2018-05-11
Payer: COMMERCIAL

## 2018-05-11 VITALS
TEMPERATURE: 96.6 F | DIASTOLIC BLOOD PRESSURE: 96 MMHG | BODY MASS INDEX: 46.32 KG/M2 | HEART RATE: 95 BPM | WEIGHT: 237.2 LBS | SYSTOLIC BLOOD PRESSURE: 154 MMHG

## 2018-05-11 DIAGNOSIS — B95.1 GROUP B STREPTOCOCCUS URINARY TRACT INFECTION AFFECTING PREGNANCY IN FIRST TRIMESTER: ICD-10-CM

## 2018-05-11 DIAGNOSIS — Z34.83 ENCOUNTER FOR SUPERVISION OF OTHER NORMAL PREGNANCY IN THIRD TRIMESTER: ICD-10-CM

## 2018-05-11 DIAGNOSIS — O10.919 CHRONIC HYPERTENSION AFFECTING PREGNANCY: Primary | ICD-10-CM

## 2018-05-11 DIAGNOSIS — O23.41 GROUP B STREPTOCOCCUS URINARY TRACT INFECTION AFFECTING PREGNANCY IN FIRST TRIMESTER: ICD-10-CM

## 2018-05-11 LAB
ALT SERPL W P-5'-P-CCNC: 23 U/L (ref 0–50)
AST SERPL W P-5'-P-CCNC: 21 U/L (ref 0–45)
CREAT SERPL-MCNC: 0.56 MG/DL (ref 0.52–1.04)
ERYTHROCYTE [DISTWIDTH] IN BLOOD BY AUTOMATED COUNT: 15.6 % (ref 10–15)
GFR SERPL CREATININE-BSD FRML MDRD: >90 ML/MIN/1.7M2
HCT VFR BLD AUTO: 39 % (ref 35–47)
HGB BLD-MCNC: 12.6 G/DL (ref 11.7–15.7)
MCH RBC QN AUTO: 29.4 PG (ref 26.5–33)
MCHC RBC AUTO-ENTMCNC: 32.3 G/DL (ref 31.5–36.5)
MCV RBC AUTO: 91 FL (ref 78–100)
PLATELET # BLD AUTO: 264 10E9/L (ref 150–450)
PROT UR-MCNC: 0.18 G/L
PROT/CREAT 24H UR: 0.18 G/G CR (ref 0–0.2)
RBC # BLD AUTO: 4.29 10E12/L (ref 3.8–5.2)
WBC # BLD AUTO: 13.7 10E9/L (ref 4–11)

## 2018-05-11 PROCEDURE — 99207 ZZC PRENATAL VISIT: CPT | Performed by: OBSTETRICS & GYNECOLOGY

## 2018-05-11 PROCEDURE — 82565 ASSAY OF CREATININE: CPT | Performed by: OBSTETRICS & GYNECOLOGY

## 2018-05-11 PROCEDURE — 84450 TRANSFERASE (AST) (SGOT): CPT | Performed by: OBSTETRICS & GYNECOLOGY

## 2018-05-11 PROCEDURE — 85027 COMPLETE CBC AUTOMATED: CPT | Performed by: OBSTETRICS & GYNECOLOGY

## 2018-05-11 PROCEDURE — 84156 ASSAY OF PROTEIN URINE: CPT | Performed by: OBSTETRICS & GYNECOLOGY

## 2018-05-11 PROCEDURE — 36415 COLL VENOUS BLD VENIPUNCTURE: CPT | Performed by: OBSTETRICS & GYNECOLOGY

## 2018-05-11 PROCEDURE — 84460 ALANINE AMINO (ALT) (SGPT): CPT | Performed by: OBSTETRICS & GYNECOLOGY

## 2018-05-11 RX ORDER — CARBOPROST TROMETHAMINE 250 UG/ML
250 INJECTION, SOLUTION INTRAMUSCULAR
Status: CANCELLED | OUTPATIENT
Start: 2018-05-11

## 2018-05-11 RX ORDER — ACETAMINOPHEN 325 MG/1
650 TABLET ORAL EVERY 4 HOURS PRN
Status: CANCELLED | OUTPATIENT
Start: 2018-05-11

## 2018-05-11 RX ORDER — NALOXONE HYDROCHLORIDE 0.4 MG/ML
.1-.4 INJECTION, SOLUTION INTRAMUSCULAR; INTRAVENOUS; SUBCUTANEOUS
Status: CANCELLED | OUTPATIENT
Start: 2018-05-11

## 2018-05-11 RX ORDER — FENTANYL CITRATE 50 UG/ML
50-100 INJECTION, SOLUTION INTRAMUSCULAR; INTRAVENOUS
Status: CANCELLED | OUTPATIENT
Start: 2018-05-11

## 2018-05-11 RX ORDER — IBUPROFEN 200 MG
800 TABLET ORAL
Status: CANCELLED | OUTPATIENT
Start: 2018-05-11

## 2018-05-11 RX ORDER — OXYTOCIN 10 [USP'U]/ML
10 INJECTION, SOLUTION INTRAMUSCULAR; INTRAVENOUS
Status: CANCELLED | OUTPATIENT
Start: 2018-05-11

## 2018-05-11 RX ORDER — LIDOCAINE 40 MG/G
CREAM TOPICAL
Status: CANCELLED | OUTPATIENT
Start: 2018-05-11

## 2018-05-11 RX ORDER — OXYTOCIN/0.9 % SODIUM CHLORIDE 30/500 ML
100-340 PLASTIC BAG, INJECTION (ML) INTRAVENOUS CONTINUOUS PRN
Status: CANCELLED | OUTPATIENT
Start: 2018-05-11

## 2018-05-11 RX ORDER — SODIUM CHLORIDE, SODIUM LACTATE, POTASSIUM CHLORIDE, CALCIUM CHLORIDE 600; 310; 30; 20 MG/100ML; MG/100ML; MG/100ML; MG/100ML
INJECTION, SOLUTION INTRAVENOUS CONTINUOUS
Status: CANCELLED | OUTPATIENT
Start: 2018-05-11

## 2018-05-11 RX ORDER — OXYCODONE AND ACETAMINOPHEN 5; 325 MG/1; MG/1
1 TABLET ORAL
Status: CANCELLED | OUTPATIENT
Start: 2018-05-11

## 2018-05-11 RX ORDER — METHYLERGONOVINE MALEATE 0.2 MG/ML
200 INJECTION INTRAVENOUS
Status: CANCELLED | OUTPATIENT
Start: 2018-05-11

## 2018-05-11 RX ORDER — ONDANSETRON 2 MG/ML
4 INJECTION INTRAMUSCULAR; INTRAVENOUS EVERY 6 HOURS PRN
Status: CANCELLED | OUTPATIENT
Start: 2018-05-11

## 2018-05-11 NOTE — PROGRESS NOTES
GBS: Positive  Hemoglobin   Date Value Ref Range Status   04/17/2018 12.1 11.7 - 15.7 g/dL Final   ]    Breast pump rx: fill inpatient  Labor orders: signed and held  Birth plan: no  Length of stay: discussed, probably 2 days given blood pressures  Disability paperwork: NA  Resident involvement: discussed and agrees.    Good fetal movement.   Discussed IOL. BP a little higher today, we'll do PIH labs. If superimposed pre-eclampsia will plan at 37 weeks, which is in a few days.   If normal will plan at 38-39 weeks. I will schedule her IOL when labs are back.   Weight gain on track for starting BMI  Plan to RTC weekly.

## 2018-05-12 ASSESSMENT — PATIENT HEALTH QUESTIONNAIRE - PHQ9: SUM OF ALL RESPONSES TO PHQ QUESTIONS 1-9: 0

## 2018-05-16 ENCOUNTER — OFFICE VISIT (OUTPATIENT)
Dept: MATERNAL FETAL MEDICINE | Facility: CLINIC | Age: 33
End: 2018-05-16
Attending: OBSTETRICS & GYNECOLOGY
Payer: COMMERCIAL

## 2018-05-16 ENCOUNTER — HOSPITAL ENCOUNTER (OUTPATIENT)
Dept: ULTRASOUND IMAGING | Facility: CLINIC | Age: 33
Discharge: HOME | End: 2018-05-16
Attending: OBSTETRICS & GYNECOLOGY | Admitting: OBSTETRICS & GYNECOLOGY
Payer: COMMERCIAL

## 2018-05-16 ENCOUNTER — PRENATAL OFFICE VISIT (OUTPATIENT)
Dept: OBGYN | Facility: CLINIC | Age: 33
End: 2018-05-16
Payer: COMMERCIAL

## 2018-05-16 VITALS
SYSTOLIC BLOOD PRESSURE: 140 MMHG | BODY MASS INDEX: 46.36 KG/M2 | DIASTOLIC BLOOD PRESSURE: 92 MMHG | WEIGHT: 237.4 LBS | TEMPERATURE: 97.9 F

## 2018-05-16 DIAGNOSIS — O10.919 CHRONIC HYPERTENSION AFFECTING PREGNANCY: Primary | ICD-10-CM

## 2018-05-16 DIAGNOSIS — O10.919 CHRONIC HYPERTENSION IN PREGNANCY: ICD-10-CM

## 2018-05-16 PROCEDURE — 99207 ZZC PRENATAL VISIT: CPT | Performed by: OBSTETRICS & GYNECOLOGY

## 2018-05-16 PROCEDURE — 76819 FETAL BIOPHYS PROFIL W/O NST: CPT

## 2018-05-16 NOTE — PROGRESS NOTES
"Please see \"Imaging\" tab under \"Chart Review\" for details of today's US at the [facility].    Anand Arango MD  Maternal-Fetal Medicine      "

## 2018-05-16 NOTE — PROGRESS NOTES
37w2d Feeling ok, no c/o.  Good fm.  Denies preeclamptic sx.  BP stable today.  Has IOL scheduled 5/21, discussed ripening vs pitocin and typical timelines.  Questions answered.   Labor instructions and kick counts reviewed. andie

## 2018-05-16 NOTE — MR AVS SNAPSHOT
After Visit Summary   5/16/2018    Ruby Morrison    MRN: [id number]           Patient Information     Date Of Birth          1985        Visit Information        Provider Department      5/16/2018 12:30 PM Estrella Stiles MD [facility] [place] [place]        Today's Diagnoses     Chronic hypertension affecting pregnancy    -  1       Follow-ups after your visit        Your next 10 appointments already scheduled     May 16, 2018 12:30 PM CDT   ESTABLISHED PRENATAL with Estrella Stiles MD   [facility] [place] [place] ([facility] [place] [place])    [address] [place] [id number] [phone]              Who to contact     If you have questions or need follow up information about today's clinic visit or your schedule please contact [facility] directly at [phone].  Normal or non-critical lab and imaging results will be communicated to you by MyChart, letter or phone within 4 business days after the clinic has received the results. If you do not hear from us within 7 days, please contact the clinic through MyChart or phone. If you have a critical or abnormal lab result, we will notify you by phone as soon as possible.  Submit refill requests through Veeqo or call your pharmacy and they will forward the refill request to us. Please allow 3 business days for your refill to be completed.          Additional Information About Your Visit        MyChart Information     Veeqo gives you secure access to your electronic health record. If you see a primary care provider, you can also send messages to your care team and make appointments. If you have questions, please call your primary care clinic.  If you do not have a primary care provider, please call [phone] and they will assist you.        Care EveryWhere ID     This is your Care EveryWhere ID. This could be used by other organizations to access your [facility]  records  [id number]        Your Vitals Were     Temperature BMI (Body Mass Index)                97.9  F (36.6  C) (Oral) 46.36 kg/m2           Blood Pressure from Last 3 Encounters:   05/16/18 (!) 140/92   05/11/18 (!) 154/96   05/02/18 (!) 144/92    Weight from Last 3 Encounters:   05/16/18 237 lb 6.4 oz (107.7 kg)   05/11/18 237 lb 3.2 oz (107.6 kg)   05/02/18 236 lb 4.8 oz (107.2 kg)              Today, you had the following     No orders found for display       Primary Care Provider Office Phone # Fax #    Mesha John  [phone]       [facility] [place] [address]        Equal Access to Services     BRIGID HWANG : Evelin Lucero, warobert venegas, farooq kaaljackelyn cornejo, tosin rollins . So [facility] [phone].    ATENCIÓN: Si habla español, tiene a mendez disposición servicios gratuitos de asistencia lingüística. Llbárbara al [phone].    We comply with applicable federal civil rights laws and [place] laws. We do not discriminate on the basis of race, color, national origin, age, disability, sex, sexual orientation, or gender identity.            Thank you!     Thank you for choosing [facility] [place]  for your care. Our goal is always to provide you with excellent care. Hearing back from our patients is one way we can continue to improve our services. Please take a few minutes to complete the written survey that you may receive in the mail after your visit with us. Thank you!             Your Updated Medication List - Protect others around you: Learn how to safely use, store and throw away your medicines at www.disposemymeds.org.          This list is accurate as of 5/16/18 12:30 PM.  Always use your most recent med list.                   Brand Name Dispense Instructions for use Diagnosis    albuterol 108 (90 Base) MCG/ACT Inhaler    PROAIR HFA/PROVENTIL HFA/VENTOLIN HFA    1 Inhaler    Inhale 2 puffs into the lungs every 4  hours as needed for shortness of breath / dyspnea    Mild persistent asthma without complication       ASPIRIN PO      Take 81 mg by mouth        diclofenac 1 % Gel topical gel    VOLTAREN    100 g    Apply  2 grams to wrist area up to four times daily as needed using enclosed dosing card.    Radial styloid tenosynovitis, Right wrist pain       montelukast 10 MG tablet    SINGULAIR    90 tablet    Take 1 tablet (10 mg) by mouth At Bedtime    Mild persistent asthma without complication       order for DME     1 each    Equipment being ordered: Wrist brace    Radial styloid tenosynovitis of right hand       PRENATAL VITAMIN PO           VITAMIN D (CHOLECALCIFEROL) PO      Take 1,000 Units by mouth daily

## 2018-05-16 NOTE — MR AVS SNAPSHOT
After Visit Summary   5/16/2018    Ruby Morrison    MRN: [id number]           Patient Information     Date Of Birth          1985        Visit Information        Provider Department      5/16/2018 12:15 PM Anadn Arango MD [facility]etal [facility]        Today's Diagnoses     Chronic hypertension affecting pregnancy    -  1       Follow-ups after your visit        Who to contact     If you have questions or need follow up information about today's clinic visit or your schedule please contact [facility] directly at [phone].  Normal or non-critical lab and imaging results will be communicated to you by Oleryhart, letter or phone within 4 business days after the clinic has received the results. If you do not hear from us within 7 days, please contact the clinic through InPact.met or phone. If you have a critical or abnormal lab result, we will notify you by phone as soon as possible.  Submit refill requests through Sportlobster or call your pharmacy and they will forward the refill request to us. Please allow 3 business days for your refill to be completed.          Additional Information About Your Visit        MyChart Information     Sportlobster gives you secure access to your electronic health record. If you see a primary care provider, you can also send messages to your care team and make appointments. If you have questions, please call your primary care clinic.  If you do not have a primary care provider, please call [phone] and they will assist you.        Care EveryWhere ID     This is your Care EveryWhere ID. This could be used by other organizations to access your [facility] medical records  [id number]         Blood Pressure from Last 3 Encounters:   05/16/18 (!) 140/92   05/11/18 (!) 154/96   05/02/18 (!) 144/92    Weight from Last 3 Encounters:   05/16/18 107.7 kg (237 lb 6.4 oz)   05/11/18 107.6 kg (237 lb 3.2 oz)   05/02/18 107.2  kg (236 lb 4.8 oz)              Today, you had the following     No orders found for display       Primary Care Provider Office Phone # Fax #    Mesha John [phone]       [address] [facility] [address]        Equal Access to Services     BRIGID HWANG : Hadii ivy orellana hadasho Soomaali, waaxda luqadaha, qaybta kaalmada adeegyada, waxnorma davisayaanbarbi vaca. So [facility] [phone].    ATENCIÓN: Si habla español, tiene a mendez disposición servicios gratuitos de asistencia lingüística. Llame al [phone].    We comply with applicable federal civil rights laws and [place] laws. We do not discriminate on the basis of race, color, national origin, age, disability, sex, sexual orientation, or gender identity.            Thank you!     Thank you for choosing [facility]  for your care. Our goal is always to provide you with excellent care. Hearing back from our patients is one way we can continue to improve our services. Please take a few minutes to complete the written survey that you may receive in the mail after your visit with us. Thank you!             Your Updated Medication List - Protect others around you: Learn how to safely use, store and throw away your medicines at www.disposemymeds.org.          This list is accurate as of 5/16/18 12:37 PM.  Always use your most recent med list.                   Brand Name Dispense Instructions for use Diagnosis    albuterol 108 (90 Base) MCG/ACT Inhaler    PROAIR HFA/PROVENTIL HFA/VENTOLIN HFA    1 Inhaler    Inhale 2 puffs into the lungs every 4 hours as needed for shortness of breath / dyspnea    Mild persistent asthma without complication       ASPIRIN PO      Take 81 mg by mouth        diclofenac 1 % Gel topical gel    VOLTAREN    100 g    Apply  2 grams to wrist area up to four times daily as needed using enclosed dosing card.    Radial styloid tenosynovitis, Right wrist pain       montelukast  10 MG tablet    SINGULAIR    90 tablet    Take 1 tablet (10 mg) by mouth At Bedtime    Mild persistent asthma without complication       order for DME     1 each    Equipment being ordered: Wrist brace    Radial styloid tenosynovitis of right hand       PRENATAL VITAMIN PO           VITAMIN D (CHOLECALCIFEROL) PO      Take 1,000 Units by mouth daily

## 2018-05-21 ENCOUNTER — HOSPITAL ENCOUNTER (INPATIENT)
Facility: CLINIC | Age: 33
LOS: 4 days | Discharge: HOME HEALTH | End: 2018-05-25
Attending: OBSTETRICS & GYNECOLOGY | Admitting: OBSTETRICS & GYNECOLOGY
Payer: COMMERCIAL

## 2018-05-21 ENCOUNTER — ANESTHESIA (OUTPATIENT)
Dept: OBGYN | Facility: CLINIC | Age: 33
End: 2018-05-21
Payer: COMMERCIAL

## 2018-05-21 ENCOUNTER — ANESTHESIA EVENT (OUTPATIENT)
Dept: OBGYN | Facility: CLINIC | Age: 33
End: 2018-05-21
Payer: COMMERCIAL

## 2018-05-21 DIAGNOSIS — O10.919 CHRONIC HYPERTENSION AFFECTING PREGNANCY: Primary | ICD-10-CM

## 2018-05-21 LAB
ABO + RH BLD: NORMAL
ABO + RH BLD: NORMAL
ALBUMIN SERPL-MCNC: 2.1 G/DL (ref 3.4–5)
ALP SERPL-CCNC: 93 U/L (ref 40–150)
ALT SERPL W P-5'-P-CCNC: 26 U/L (ref 0–50)
ANION GAP SERPL CALCULATED.3IONS-SCNC: 13 MMOL/L (ref 3–14)
AST SERPL W P-5'-P-CCNC: 19 U/L (ref 0–45)
BILIRUB SERPL-MCNC: 0.2 MG/DL (ref 0.2–1.3)
BLD GP AB SCN SERPL QL: NORMAL
BLOOD BANK CMNT PATIENT-IMP: NORMAL
BLOOD BANK CMNT PATIENT-IMP: NORMAL
BUN SERPL-MCNC: 9 MG/DL (ref 7–30)
CALCIUM SERPL-MCNC: 8.6 MG/DL (ref 8.5–10.1)
CHLORIDE SERPL-SCNC: 106 MMOL/L (ref 94–109)
CO2 SERPL-SCNC: 20 MMOL/L (ref 20–32)
CREAT SERPL-MCNC: 0.52 MG/DL (ref 0.52–1.04)
GFR SERPL CREATININE-BSD FRML MDRD: >90 ML/MIN/1.7M2
GLUCOSE SERPL-MCNC: 132 MG/DL (ref 70–99)
HGB BLD-MCNC: 12.6 G/DL (ref 11.7–15.7)
PLATELET # BLD AUTO: 255 10E9/L (ref 150–450)
POTASSIUM SERPL-SCNC: 3.7 MMOL/L (ref 3.4–5.3)
PROT SERPL-MCNC: 6.7 G/DL (ref 6.8–8.8)
SODIUM SERPL-SCNC: 139 MMOL/L (ref 133–144)
SPECIMEN EXP DATE BLD: NORMAL
T PALLIDUM AB SER QL: NONREACTIVE

## 2018-05-21 PROCEDURE — 86850 RBC ANTIBODY SCREEN: CPT | Performed by: OBSTETRICS & GYNECOLOGY

## 2018-05-21 PROCEDURE — 36415 COLL VENOUS BLD VENIPUNCTURE: CPT | Performed by: OBSTETRICS & GYNECOLOGY

## 2018-05-21 PROCEDURE — 80053 COMPREHEN METABOLIC PANEL: CPT | Performed by: OBSTETRICS & GYNECOLOGY

## 2018-05-21 PROCEDURE — 86901 BLOOD TYPING SEROLOGIC RH(D): CPT | Performed by: OBSTETRICS & GYNECOLOGY

## 2018-05-21 PROCEDURE — 85018 HEMOGLOBIN: CPT | Performed by: OBSTETRICS & GYNECOLOGY

## 2018-05-21 PROCEDURE — 59200 INSERT CERVICAL DILATOR: CPT | Performed by: OBSTETRICS & GYNECOLOGY

## 2018-05-21 PROCEDURE — 25000128 H RX IP 250 OP 636

## 2018-05-21 PROCEDURE — 85049 AUTOMATED PLATELET COUNT: CPT | Performed by: OBSTETRICS & GYNECOLOGY

## 2018-05-21 PROCEDURE — 86780 TREPONEMA PALLIDUM: CPT | Performed by: OBSTETRICS & GYNECOLOGY

## 2018-05-21 PROCEDURE — 25000132 ZZH RX MED GY IP 250 OP 250 PS 637: Performed by: STUDENT IN AN ORGANIZED HEALTH CARE EDUCATION/TRAINING PROGRAM

## 2018-05-21 PROCEDURE — 25000128 H RX IP 250 OP 636: Performed by: OBSTETRICS & GYNECOLOGY

## 2018-05-21 PROCEDURE — 12000030 ZZH R&B OB INTERMEDIATE UMMC

## 2018-05-21 PROCEDURE — 25000125 ZZHC RX 250: Performed by: OBSTETRICS & GYNECOLOGY

## 2018-05-21 PROCEDURE — 86900 BLOOD TYPING SEROLOGIC ABO: CPT | Performed by: OBSTETRICS & GYNECOLOGY

## 2018-05-21 RX ORDER — OXYTOCIN/0.9 % SODIUM CHLORIDE 30/500 ML
1-24 PLASTIC BAG, INJECTION (ML) INTRAVENOUS CONTINUOUS
Status: DISCONTINUED | OUTPATIENT
Start: 2018-05-21 | End: 2018-05-22

## 2018-05-21 RX ORDER — FENTANYL/BUPIVACAINE/NS/PF 2-1250MCG
PLASTIC BAG, INJECTION (ML) INJECTION CONTINUOUS PRN
Status: DISCONTINUED | OUTPATIENT
Start: 2018-05-21 | End: 2018-12-11 | Stop reason: HOSPADM

## 2018-05-21 RX ORDER — EPHEDRINE SULFATE 50 MG/ML
5 INJECTION, SOLUTION INTRAMUSCULAR; INTRAVENOUS; SUBCUTANEOUS
Status: DISCONTINUED | OUTPATIENT
Start: 2018-05-21 | End: 2018-05-22

## 2018-05-21 RX ORDER — SODIUM CHLORIDE, SODIUM LACTATE, POTASSIUM CHLORIDE, CALCIUM CHLORIDE 600; 310; 30; 20 MG/100ML; MG/100ML; MG/100ML; MG/100ML
INJECTION, SOLUTION INTRAVENOUS CONTINUOUS
Status: DISCONTINUED | OUTPATIENT
Start: 2018-05-21 | End: 2018-05-22

## 2018-05-21 RX ORDER — LIDOCAINE 40 MG/G
CREAM TOPICAL
Status: DISCONTINUED | OUTPATIENT
Start: 2018-05-21 | End: 2018-05-22

## 2018-05-21 RX ORDER — OXYTOCIN/0.9 % SODIUM CHLORIDE 30/500 ML
100-340 PLASTIC BAG, INJECTION (ML) INTRAVENOUS CONTINUOUS PRN
Status: COMPLETED | OUTPATIENT
Start: 2018-05-21 | End: 2018-05-22

## 2018-05-21 RX ORDER — LIDOCAINE HYDROCHLORIDE 10 MG/ML
INJECTION, SOLUTION INFILTRATION; PERINEURAL
Status: COMPLETED
Start: 2018-05-21 | End: 2018-05-22

## 2018-05-21 RX ORDER — MISOPROSTOL 200 UG/1
TABLET ORAL
Status: DISCONTINUED
Start: 2018-05-21 | End: 2018-05-22 | Stop reason: HOSPADM

## 2018-05-21 RX ORDER — EPHEDRINE SULFATE 50 MG/ML
INJECTION, SOLUTION INTRAMUSCULAR; INTRAVENOUS; SUBCUTANEOUS
Status: DISCONTINUED
Start: 2018-05-21 | End: 2018-05-22 | Stop reason: HOSPADM

## 2018-05-21 RX ORDER — LIDOCAINE HYDROCHLORIDE 10 MG/ML
INJECTION, SOLUTION INFILTRATION; PERINEURAL
Status: DISCONTINUED
Start: 2018-05-21 | End: 2018-05-22 | Stop reason: HOSPADM

## 2018-05-21 RX ORDER — IBUPROFEN 800 MG/1
800 TABLET, FILM COATED ORAL
Status: DISCONTINUED | OUTPATIENT
Start: 2018-05-21 | End: 2018-05-22

## 2018-05-21 RX ORDER — OXYTOCIN 10 [USP'U]/ML
10 INJECTION, SOLUTION INTRAMUSCULAR; INTRAVENOUS
Status: DISCONTINUED | OUTPATIENT
Start: 2018-05-21 | End: 2018-05-22

## 2018-05-21 RX ORDER — ACETAMINOPHEN 325 MG/1
650 TABLET ORAL EVERY 4 HOURS PRN
Status: DISCONTINUED | OUTPATIENT
Start: 2018-05-21 | End: 2018-05-22

## 2018-05-21 RX ORDER — FENTANYL CITRATE 50 UG/ML
50-100 INJECTION, SOLUTION INTRAMUSCULAR; INTRAVENOUS
Status: DISCONTINUED | OUTPATIENT
Start: 2018-05-21 | End: 2018-05-22

## 2018-05-21 RX ORDER — ONDANSETRON 2 MG/ML
4 INJECTION INTRAMUSCULAR; INTRAVENOUS EVERY 6 HOURS PRN
Status: DISCONTINUED | OUTPATIENT
Start: 2018-05-21 | End: 2018-05-22

## 2018-05-21 RX ORDER — FENTANYL/BUPIVACAINE/NS/PF 2-625MCG/1
PLASTIC BAG, INJECTION (ML) INJECTION
Status: COMPLETED
Start: 2018-05-21 | End: 2018-05-21

## 2018-05-21 RX ORDER — MISOPROSTOL 100 UG/1
25 TABLET ORAL EVERY 4 HOURS PRN
Status: DISCONTINUED | OUTPATIENT
Start: 2018-05-21 | End: 2018-05-22

## 2018-05-21 RX ORDER — TERBUTALINE SULFATE 1 MG/ML
0.25 INJECTION, SOLUTION SUBCUTANEOUS
Status: DISCONTINUED | OUTPATIENT
Start: 2018-05-21 | End: 2018-05-22

## 2018-05-21 RX ORDER — CARBOPROST TROMETHAMINE 250 UG/ML
250 INJECTION, SOLUTION INTRAMUSCULAR
Status: DISCONTINUED | OUTPATIENT
Start: 2018-05-21 | End: 2018-05-22

## 2018-05-21 RX ORDER — OXYTOCIN 10 [USP'U]/ML
INJECTION, SOLUTION INTRAMUSCULAR; INTRAVENOUS
Status: DISCONTINUED
Start: 2018-05-21 | End: 2018-05-22 | Stop reason: WASHOUT

## 2018-05-21 RX ORDER — OXYCODONE AND ACETAMINOPHEN 5; 325 MG/1; MG/1
1 TABLET ORAL
Status: DISCONTINUED | OUTPATIENT
Start: 2018-05-21 | End: 2018-05-22

## 2018-05-21 RX ORDER — METHYLERGONOVINE MALEATE 0.2 MG/ML
200 INJECTION INTRAVENOUS
Status: DISCONTINUED | OUTPATIENT
Start: 2018-05-21 | End: 2018-05-22

## 2018-05-21 RX ORDER — NALBUPHINE HYDROCHLORIDE 10 MG/ML
2.5-5 INJECTION, SOLUTION INTRAMUSCULAR; INTRAVENOUS; SUBCUTANEOUS EVERY 6 HOURS PRN
Status: DISCONTINUED | OUTPATIENT
Start: 2018-05-21 | End: 2018-05-22

## 2018-05-21 RX ORDER — NALOXONE HYDROCHLORIDE 0.4 MG/ML
.1-.4 INJECTION, SOLUTION INTRAMUSCULAR; INTRAVENOUS; SUBCUTANEOUS
Status: DISCONTINUED | OUTPATIENT
Start: 2018-05-21 | End: 2018-05-21

## 2018-05-21 RX ORDER — NALOXONE HYDROCHLORIDE 0.4 MG/ML
.1-.4 INJECTION, SOLUTION INTRAMUSCULAR; INTRAVENOUS; SUBCUTANEOUS
Status: DISCONTINUED | OUTPATIENT
Start: 2018-05-21 | End: 2018-05-22

## 2018-05-21 RX ORDER — OXYTOCIN 10 [USP'U]/ML
INJECTION, SOLUTION INTRAMUSCULAR; INTRAVENOUS
Status: DISCONTINUED
Start: 2018-05-21 | End: 2018-05-22 | Stop reason: HOSPADM

## 2018-05-21 RX ORDER — CALCIUM CARBONATE 500 MG/1
2 TABLET, CHEWABLE ORAL 3 TIMES DAILY
COMMUNITY
End: 2018-07-10

## 2018-05-21 RX ORDER — LIDOCAINE 40 MG/G
CREAM TOPICAL
Status: DISCONTINUED | OUTPATIENT
Start: 2018-05-21 | End: 2018-05-21

## 2018-05-21 RX ORDER — OXYTOCIN/0.9 % SODIUM CHLORIDE 30/500 ML
PLASTIC BAG, INJECTION (ML) INTRAVENOUS
Status: COMPLETED
Start: 2018-05-21 | End: 2018-05-22

## 2018-05-21 RX ADMIN — OXYTOCIN-SODIUM CHLORIDE 0.9% IV SOLN 30 UNIT/500ML 1 MILLI-UNITS/MIN: 30-0.9/5 SOLUTION at 20:59

## 2018-05-21 RX ADMIN — Medication 2.5 MILLION UNITS: at 21:47

## 2018-05-21 RX ADMIN — Medication: at 20:43

## 2018-05-21 RX ADMIN — SODIUM CHLORIDE, POTASSIUM CHLORIDE, SODIUM LACTATE AND CALCIUM CHLORIDE 500 ML: 600; 310; 30; 20 INJECTION, SOLUTION INTRAVENOUS at 19:25

## 2018-05-21 RX ADMIN — SODIUM CHLORIDE, POTASSIUM CHLORIDE, SODIUM LACTATE AND CALCIUM CHLORIDE 500 ML: 600; 310; 30; 20 INJECTION, SOLUTION INTRAVENOUS at 14:13

## 2018-05-21 RX ADMIN — SODIUM CHLORIDE 5 MILLION UNITS: 9 INJECTION, SOLUTION INTRAVENOUS at 17:40

## 2018-05-21 RX ADMIN — Medication 8 ML/HR: at 20:23

## 2018-05-21 RX ADMIN — Medication 25 MCG: at 10:25

## 2018-05-21 ASSESSMENT — ASTHMA QUESTIONNAIRES: QUESTION_5 LAST FOUR WEEKS HOW WOULD YOU RATE YOUR ASTHMA CONTROL: WELL CONTROLLED

## 2018-05-21 NOTE — PROGRESS NOTES
Asked to assess patient while Dr. Bernal was in surgery.  Misoprostol given at 1025.  Ctx q 6 minutes.  FHTs currently show minimal variability, no accels, small late decels with several ctx.  Baseline 150s.  Cx per RN unchanged from initial exam.  Fluid bolus given, position changes underway.  Will monitor closely.

## 2018-05-21 NOTE — PROGRESS NOTES
[facility] PROGRESS NOTE:   May 21, 2018   5:12 PM         SUBJECTIVE:   Patient c/o nothing.    Contractions:  q 6 minutes  Leakage of fluid:  No  Vaginal bleeding:  No  Pain controlled:  Yes           OBJECTIVE:     Vitals:    05/21/18 1225 05/21/18 1420 05/21/18 1539 05/21/18 1551   BP: 136/83 140/86 (!) 143/101 134/85   Temp:   98.5  F (36.9  C)    TempSrc:   Oral    SpO2:       Weight:       Height:             NST:  Fetal Heart Rate Tracing:   Baseline: 135  Variability: Moderate  Accels, no decels.     Tocometer: q 6 minutes    Abdomen:  Gravid, NT  Cervix:   Dilation: 3.5   Effacement: 80%   Station:-2   Consistency: soft   Position: Mid           LABS:     Recent Results (from the past 12 hour(s))   ABO/Rh type and screen    Collection Time: 05/21/18  9:24 AM   Result Value Ref Range    ABO A     RH(D) Pos     Antibody Screen Neg     Test Valid Only At          [facility]    Specimen Expires 05/24/2018     Blood Bank Comment       Delay in availability of Red Blood Cells called to  [facility] 5/21/18 @1050 BY RU     Hemoglobin    Collection Time: 05/21/18  9:24 AM   Result Value Ref Range    Hemoglobin 12.6 11.7 - 15.7 g/dL   Platelet count    Collection Time: 05/21/18  9:24 AM   Result Value Ref Range    Platelet Count 255 150 - 450 10e9/L   Treponema Abs w Reflex to RPR and Titer    Collection Time: 05/21/18  9:24 AM   Result Value Ref Range    Treponema Antibodies Nonreactive NR^Nonreactive   Comprehensive metabolic panel    Collection Time: 05/21/18  9:24 AM   Result Value Ref Range    Sodium 139 133 - 144 mmol/L    Potassium 3.7 3.4 - 5.3 mmol/L    Chloride 106 94 - 109 mmol/L    Carbon Dioxide 20 20 - 32 mmol/L    Anion Gap 13 3 - 14 mmol/L    Glucose 132 (H) 70 - 99 mg/dL    Urea Nitrogen 9 7 - 30 mg/dL    Creatinine 0.52 0.52 - 1.04 mg/dL    GFR Estimate >90 >60 mL/min/1.7m2    GFR Estimate If Black >90 >60 mL/min/1.7m2     Calcium 8.6 8.5 - 10.1 mg/dL    Bilirubin Total 0.2 0.2 - 1.3 mg/dL    Albumin 2.1 (L) 3.4 - 5.0 g/dL    Protein Total 6.7 (L) 6.8 - 8.8 g/dL    Alkaline Phosphatase 93 40 - 150 U/L    ALT 26 0 - 50 U/L    AST 19 0 - 45 U/L              ASSESSMENT:   32 year old  at 38w0d   induction of labor, indication chronic hypertension.  Minimal variability and late decelerations previously resolved with position change alone.           PLAN:   Cervix now favorable. Will start pitocin then AROM when adequate GBS prophylaxis.  Prophylactic antibiotic for + GBS status  Anticipate     Tomasa Bernal MD

## 2018-05-21 NOTE — IP AVS SNAPSHOT
[facility]    [address] [facility] [address]    Phone:  [phone]                                       After Visit Summary   5/21/2018    Ruby Morrison    MRN: [id number]           After Visit Summary Signature Page     I have received my discharge instructions, and my questions have been answered. I have discussed any challenges I see with this plan with the nurse or doctor.    ..........................................................................................................................................  Patient/Patient Representative Signature      ..........................................................................................................................................  Patient Representative Print Name and Relationship to Patient    ..................................................               ................................................  Date                                            Time    ..........................................................................................................................................  Reviewed by Signature/Title    ...................................................              ..............................................  Date                                                            Time

## 2018-05-21 NOTE — PROVIDER NOTIFICATION
05/21/18 0944   Provider Notification   Provider Name/Title Dr. Bernal   Method of Notification At Bedside   Request Evaluate in Person   Notification Reason Patient Arrived;SVE   Provider here to discuss plan for induction

## 2018-05-21 NOTE — H&P
[facility] Labor and Delivery History and Physical    Ruby Morrison MRN# [id number]   Age: 32 year old YOB: 1985     Date of Admission:  18     Primary care provider: Mesha John           HPI:     Ruby Morrison is a 32 year old  at 38w0d by EDC consistent with 6w5d US here for IOL for chronic hypertension.     Patient reports doing well today.  Denies decreased fetal movement, loss of fluid, vaginal bleeding, or regular, painful contractions.  She denies HA, vision changes, chest pain, shortness of breath, nausea, vomiting, or dysuria.        OB Problem List:   1. Chronic HTN - not on medications  2. GBS bacteriuria   3. mild persistent asthma - no current sx  4. 4.4cm intramural fibroid         Pregnancy history:     OBSTETRIC HISTORY:    Obstetric History       T1      L1     SAB0   TAB0   Ectopic0   Multiple0   Live Births1       # Outcome Date GA Lbr Serge/2nd Weight Sex Delivery Anes PTL Lv   2 Current            1 Term 16 37w0d / 03:50 2.551 kg (5 lb 10 oz) F Vag-Spont EPI  GARY      Apgar1:  2                Apgar5: 6          Prenatal Labs:   Lab Results   Component Value Date    ABO A 2018    RH Pos 2018    AS PENDING 2018    HEPBANG Nonreactive 10/30/2017    CHPCRT  2011     Negative for C. trachomatis rRNA by transcription mediated amplification.   A negative result by transcription mediated amplification does not preclude the   presence of C. trachomatis infection because results are dependent on proper   and adequate collection, absence of inhibitors, and sufficient rRNA to be   detected.   A negative urine result for a female patient who is clinically suspected of   having a chlamydial infection does not rule out the presence of C. trachomatis   in the urogenital tract.    GCPCRT  2011     Negative for N. gonorrhoeae rRNA by transcription mediated amplification.   A negative result by transcription mediated  amplification does not preclude the   presence of N. gonorrhoeae infection because results are dependent on proper   and adequate collection, absence of inhibitors, and sufficient rRNA to be   detected.   A negative urine result for a female patient who is clinically suspect of   having a gonococcal infection does not rule out the presence of N. gonorrhoeae   in the urogenital tract.    TREPAB Negative 10/30/2017    HGB 12.6 05/21/2018       GBS Status:   Lab Results   Component Value Date    GBS Positive (A) 05/02/2018       Active Problem List  Patient Active Problem List   Diagnosis     Mild persistent asthma     Environmental allergies     GERD (gastroesophageal reflux disease)     CARDIOVASCULAR SCREENING; LDL GOAL LESS THAN 130     Obesity     Abnormal uterine bleeding     PCOS (polycystic ovarian syndrome)     Need for Tdap vaccination     Encounter for supervision of other normal pregnancy     Group B Streptococcus urinary tract infection affecting pregnancy in first trimester     Chronic hypertension affecting pregnancy     Indication for care in labor or delivery       Medication Prior to Admission  Prescriptions Prior to Admission   Medication Sig Dispense Refill Last Dose     ASPIRIN PO Take 81 mg by mouth   5/20/2018 at Unknown time     calcium carbonate (TUMS) 500 MG chewable tablet Take 2 chew tab by mouth 3 times daily   5/20/2018 at Unknown time     diclofenac (VOLTAREN) 1 % GEL topical gel Apply  2 grams to wrist area up to four times daily as needed using enclosed dosing card. 100 g 1 5/20/2018 at Unknown time     montelukast (SINGULAIR) 10 MG tablet Take 1 tablet (10 mg) by mouth At Bedtime 90 tablet 1 5/20/2018 at Unknown time     order for DME Equipment being ordered: Wrist brace 1 each 0 5/21/2018 at Unknown time     Prenatal Vit-Fe Fumarate-FA (PRENATAL VITAMIN PO)    5/20/2018 at Unknown time     albuterol (PROAIR HFA, PROVENTIL HFA, VENTOLIN HFA) 108 (90 BASE) MCG/ACT inhaler Inhale 2 puffs  into the lungs every 4 hours as needed for shortness of breath / dyspnea 1 Inhaler 1 More than a month at Unknown time           Maternal Past Medical History:     Past Medical History:   Diagnosis Date     Asthma      Cat allergies      GERD (gastroesophageal reflux disease)      Hx of previous reproductive problem      Hypertension     during pregnancy                       Family History:     Family History   Problem Relation Age of Onset     Migraines Mother      Hypertension Father      Lipids Father      DIABETES Maternal Grandmother      DIABETES Paternal Grandmother      CANCER Paternal Grandfather      luekemia            Social History:     Social History     Social History     Marital status:      Spouse name: N/A     Number of children: N/A     Years of education: N/A     Occupational History     Not on file.     Social History Main Topics     Smoking status: Never Smoker     Smokeless tobacco: Never Used     Alcohol use No     Drug use: No     Sexual activity: Yes     Partners: Male     Birth control/ protection:      Other Topics Concern     Parent/Sibling W/ Cabg, Mi Or Angioplasty Before 65f 55m? No     Social History Narrative    Caffeine intake/servings daily - soda 1/day    Calcium intake/servings daily - 2/day, cheese, yogurt    Exercise none times weekly    Sunscreen used - Yes    Seatbelts used - Yes    Guns stored in the home - Yes, locked in a cabinet    Self Breast Exam - No    Pap test up to date -  Yes    Eye exam up to date -  Yes    Dental exam up to date -  Yes    DEXA scan up to date -  No    Flex Sig/Colonoscopy up to date -  No    Mammography up to date -  No    Immunizations reviewed and up to date - Yes    Abuse: Current or Past (Physical, Sexual or Emotional) - No    Do you feel safe in your environment - Yes    Do you cope well with stress - Yes    Do you suffer from insomnia - No    Last updated by: Araseli Dickerson  10/30/2017                Review of Systems:    Negative except as noted above in the HPI         Physical Exam:     Vitals:    18 0938   Temp: 97.7  F (36.5  C)   TempSrc: Oral   SpO2: 100%   Weight: 107.5 kg (237 lb)   Height: 1.524 m (5')     Gen: Alert and oriented in NAD   Cardio: RRR  Resp: no increased work of breathing   Abdomen: gravid, soft, nontender. EFW 7.5lbs   Cervix: 1/50/high  Presentation:Cephalic by bedside US    Fetal Heart Rate Tracin, mod variability, accels present, no decels  Tocometer: quiet     Imaging:    Dating Ultrasound   GA: 6w5d      Single IUP, +Fetal cardiac activity   Fetal Anatomy Survey   GA: 23+3      Single IUP, Fetal Anatomy WNL, 3 VC, Placenta: posterior    5/2   GA:  35+2  EFW 2722g           Assessment/Plan:   Ruby Morrison is a 32 year old  at 38w0d by EDC c/w 6w5d US admitted for IOL for chronic HTN.     # IOL   - Admit to labor and delivery, routine labs (and HELLP labs)  - discussed IOL process and options for cervical ripening. Will begin with PV miso (1025).     # cHTN   - not on meds at home, BP mild range   - HELLP labs drawn, no s/s preeclampsia    # FWB: Cat 1, reactive   posterior placenta; EFW 3300g  Cephalic by BSUS    # GBS: bacteriuria- penicillin when closer to active labor    # PNC: Rh positive, RI     Mouna Davila, PGY2  OB/Gyn     Physician Attestation   I, Tomasa Bernal, personally examined and evaluated this patient.  I discussed the patient with the medical student and/or resident and care team, and agree with the assessment and plan of care as documented in the note of 18  [date].      I personally reviewed vital signs, medications, labs and exam.    Key findings: Cervix unfavorable. Vaginal misoprostol for cervical ripening discussed with the patient and first dose placed vaginally by me.   Tomasa Bernal MD  Date of Service (when I saw the patient): 18

## 2018-05-21 NOTE — IP AVS SNAPSHOT
MRN:[id number]                      After Visit Summary   5/21/2018    Ruby Morrison    MRN: [id number]           Thank you!     Thank you for choosing [facility] for your care. Our goal is always to provide you with excellent care. Hearing back from our patients is one way we can continue to improve our services. Please take a few minutes to complete the written survey that you may receive in the mail after you visit with us. Thank you!        Patient Information     Date Of Birth          1985        Designated Caregiver       Most Recent Value    Caregiver    Will someone help with your care after discharge? yes    Name of designated caregiver Kike Morrison    Phone number of caregiver [phone]    Caregiver address see face sheet      About your hospital stay     You were admitted on:  May 21, 2018 You last received care in the:  [facility]    You were discharged on:  May 25, 2018       Who to Call     For medical emergencies, please call 911.  For non-urgent questions about your medical care, please call your primary care provider or clinic, [phone]          Attending Provider     Provider Specialty    Tomasa Bernal MD OB/Gyn       Primary Care Provider Office Phone # Fax #    Mesha John  [phone]      Your next 10 appointments already scheduled     May 29, 2018  8:40 AM CDT   [facility] with [facility]   [facility]    [address] [place] [id number] [phone]              Further instructions from your care team         Call your health care provider if you have any of these symptoms:        Keep your hands clean:  Always wash your hands before touching your perineal area and stitches.  This helps reduce your risk of infection.  If your hands aren't dirty, you may use an alcohol hand-rub to clean your hands. Keep your nails clean and short.    Postpartum Vaginal  Delivery Instructions    Activity       Ask family and friends for help when you need it.    Do not place anything in your vagina for 6 weeks.    You are not restricted on other activities, but take it easy for a few weeks to allow your body to recover from delivery.  You are able to do any activities you feel up to that point.    No driving until you have stopped taking your pain medications (usually two weeks after delivery).     Call your health care provider if you have any of these symptoms:       Increased pain, swelling, redness, or fluid around your stiches from an episiotomy or perineal tear.    A fever above 100.4 F (38 C) with or without chills when placing a thermometer under your tongue.    You soak a sanitary pad with blood within 1 hour, or you see blood clots larger than a golf ball.    Bleeding that lasts more than 6 weeks.    Vaginal discharge that smells bad.    Severe pain, cramping or tenderness in your lower belly area.    A need to urinate more frequently (use the toilet more often), more urgently (use the toilet very quickly), or it burns when you urinate.    Nausea and vomiting.    Redness, swelling or pain around a vein in your leg.    Problems breastfeeding or a red or painful area on your breast.    Chest pain and cough or are gasping for air.    Problems coping with sadness, anxiety, or depression.  If you have any concerns about hurting yourself or the baby, call your provider immediately.     You have questions or concerns after you return home.     Keep your hands clean:  Always wash your hands before touching your perineal area and stitches.  This helps reduce your risk of infection.  If your hands aren't dirty, you may use an alcohol hand-rub to clean your hands. Keep your nails clean and short.      Follow up in 1 week for blood pressure check up or sooner as discussed with your OB/GYN doctor.   Follow up in 6 weeks for post delivery check up.      Pending Results     No orders  found from 5/19/2018 to 5/22/2018.            Statement of Approval     Ordered          05/25/18 1046  I have reviewed and agree with all the recommendations and orders detailed in this document.  EFFECTIVE NOW     Approved and electronically signed by:  Ana María Barrera MD           05/24/18 0839  I have reviewed and agree with all the recommendations and orders detailed in this document.  EFFECTIVE NOW     Approved and electronically signed by:  Elham Merrill MD             Admission Information     Date & Time Provider Department Dept. Phone    5/21/2018 Tomasa Bernal MD [facility] [phone]      Your Vitals Were     Blood Pressure Pulse Temperature Respirations Height Weight    153/103 101 98  F (36.7  C) 16 1.524 m (5') 102.1 kg (225 lb)    Pulse Oximetry BMI (Body Mass Index)                94% 43.94 kg/m2          MyChart Information     [facility] gives you secure access to your electronic health record. If you see a primary care provider, you can also send messages to your care team and make appointments. If you have questions, please call your primary care clinic.  If you do not have a primary care provider, please call [phone] and they will assist you.        Care EveryWhere ID     This is your Care EveryWhere ID. This could be used by other organizations to access your [facility] medical records  [id number]        Equal Access to Services     BRIGID HWANG : Hadii ivy no Sodarnell, waaxda luqadaha, qaybta kaalmada clemente, tosin vaca. So [facility] [phone].    ATENCIÓN: Si habla español, tiene a mendez disposición servicios gratuitos de asistencia lingüística. Llame al [phone].    We comply with applicable federal civil rights laws and [place] laws. We do not discriminate on the basis of race, color, national origin, age, disability, sex, sexual orientation, or gender identity.               Review of your medicines      START taking         Dose / Directions    NIFEdipine ER 30 MG Tb24   Commonly known as:  ADALAT CC   Used for:  Chronic hypertension affecting pregnancy        Dose:  30 mg   Take 1 tablet (30 mg) by mouth daily   Quantity:  50 tablet   Refills:  0         CONTINUE these medicines which have NOT CHANGED        Dose / Directions    albuterol 108 (90 Base) MCG/ACT Inhaler   Commonly known as:  PROAIR HFA/PROVENTIL HFA/VENTOLIN HFA   Used for:  Mild persistent asthma without complication        Dose:  2 puff   Inhale 2 puffs into the lungs every 4 hours as needed for shortness of breath / dyspnea   Quantity:  1 Inhaler   Refills:  1       ASPIRIN PO        Dose:  81 mg   Take 81 mg by mouth   Refills:  0       calcium carbonate 500 MG chewable tablet   Commonly known as:  TUMS   Indication:  Heartburn        Dose:  2 chew tab   Take 2 chew tab by mouth 3 times daily   Refills:  0       diclofenac 1 % Gel topical gel   Commonly known as:  VOLTAREN   Used for:  Radial styloid tenosynovitis, Right wrist pain        Apply  2 grams to wrist area up to four times daily as needed using enclosed dosing card.   Quantity:  100 g   Refills:  1       montelukast 10 MG tablet   Commonly known as:  SINGULAIR   Used for:  Mild persistent asthma without complication        Dose:  1 tablet   Take 1 tablet (10 mg) by mouth At Bedtime   Quantity:  90 tablet   Refills:  1       order for DME   Used for:  Radial styloid tenosynovitis of right hand        Equipment being ordered: Wrist brace   Quantity:  1 each   Refills:  0       PRENATAL VITAMIN PO        Refills:  0            Where to get your medicines      These medications were sent to [facility] [place], [place] - [address]  [address] [facility] [address]     Phone:  [phone]     NIFEdipine ER 30 MG Tb24                Protect others around you: Learn how to safely use, store and throw away your medicines at www.disposemymeds.org.             Medication List:  This is a list of all your medications and when to take them. Check marks below indicate your daily home schedule. Keep this list as a reference.      Medications           Morning Afternoon Evening Bedtime As Needed    albuterol 108 (90 Base) MCG/ACT Inhaler   Commonly known as:  PROAIR HFA/PROVENTIL HFA/VENTOLIN HFA   Inhale 2 puffs into the lungs every 4 hours as needed for shortness of breath / dyspnea                                ASPIRIN PO   Take 81 mg by mouth                                calcium carbonate 500 MG chewable tablet   Commonly known as:  TUMS   Take 2 chew tab by mouth 3 times daily                                diclofenac 1 % Gel topical gel   Commonly known as:  VOLTAREN   Apply  2 grams to wrist area up to four times daily as needed using enclosed dosing card.                                montelukast 10 MG tablet   Commonly known as:  SINGULAIR   Take 1 tablet (10 mg) by mouth At Bedtime   Last time this was given:  10 mg on 5/24/2018  9:52 PM                                NIFEdipine ER 30 MG Tb24   Commonly known as:  ADALAT CC   Take 1 tablet (30 mg) by mouth daily   Last time this was given:  60 mg on 5/25/2018  9:01 AM                                order for DME   Equipment being ordered: Wrist brace                                PRENATAL VITAMIN PO

## 2018-05-21 NOTE — PLAN OF CARE
Problem: Labor (Cervical Ripen, Induct, Augment) (Adult,Obstetrics,Pediatric)  Goal: Signs and Symptoms of Listed Potential Problems Will be Absent, Minimized or Managed (Labor)  Signs and symptoms of listed potential problems will be absent, minimized or managed by discharge/transition of care (reference Labor (Cervical Ripen, Induct, Augment) (Adult,Obstetrics,Pediatric) CPG).   Outcome: Declining  Assessment Data: FHR normal baseline with minimal to moderate variability. Intermittent late decelerations. [place] shows irregular contractions 2-6 minutes, lasting 40-180sec.  Interventions: Provider notified, fluid bolus administered. Position change, hold next misoprostol dose,  Plan: Continuous EFM. Reassess plan for ripening after fluid bolus and intrauterine resuscitation.

## 2018-05-22 LAB
ALT SERPL W P-5'-P-CCNC: 25 U/L (ref 0–50)
AST SERPL W P-5'-P-CCNC: 35 U/L (ref 0–45)
CREAT SERPL-MCNC: 0.57 MG/DL (ref 0.52–1.04)
ERYTHROCYTE [DISTWIDTH] IN BLOOD BY AUTOMATED COUNT: 16 % (ref 10–15)
GFR SERPL CREATININE-BSD FRML MDRD: >90 ML/MIN/1.7M2
HCT VFR BLD AUTO: 35.2 % (ref 35–47)
HGB BLD-MCNC: 11.7 G/DL (ref 11.7–15.7)
MCH RBC QN AUTO: 30.2 PG (ref 26.5–33)
MCHC RBC AUTO-ENTMCNC: 33.2 G/DL (ref 31.5–36.5)
MCV RBC AUTO: 91 FL (ref 78–100)
PLATELET # BLD AUTO: 257 10E9/L (ref 150–450)
RBC # BLD AUTO: 3.88 10E12/L (ref 3.8–5.2)
WBC # BLD AUTO: 23.4 10E9/L (ref 4–11)

## 2018-05-22 PROCEDURE — 25000128 H RX IP 250 OP 636: Performed by: OBSTETRICS & GYNECOLOGY

## 2018-05-22 PROCEDURE — 10907ZC DRAINAGE OF AMNIOTIC FLUID, THERAPEUTIC FROM PRODUCTS OF CONCEPTION, VIA NATURAL OR ARTIFICIAL OPENING: ICD-10-PCS | Performed by: OBSTETRICS & GYNECOLOGY

## 2018-05-22 PROCEDURE — 3E033VJ INTRODUCTION OF OTHER HORMONE INTO PERIPHERAL VEIN, PERCUTANEOUS APPROACH: ICD-10-PCS | Performed by: OBSTETRICS & GYNECOLOGY

## 2018-05-22 PROCEDURE — 72200001 ZZH LABOR CARE VAGINAL DELIVERY SINGLE

## 2018-05-22 PROCEDURE — 59400 OBSTETRICAL CARE: CPT | Performed by: OBSTETRICS & GYNECOLOGY

## 2018-05-22 PROCEDURE — 25000131 ZZH RX MED GY IP 250 OP 636 PS 637: Performed by: OBSTETRICS & GYNECOLOGY

## 2018-05-22 PROCEDURE — 25000125 ZZHC RX 250

## 2018-05-22 PROCEDURE — 25000125 ZZHC RX 250: Performed by: ANESTHESIOLOGY

## 2018-05-22 PROCEDURE — 84460 ALANINE AMINO (ALT) (SGPT): CPT | Performed by: OBSTETRICS & GYNECOLOGY

## 2018-05-22 PROCEDURE — 36415 COLL VENOUS BLD VENIPUNCTURE: CPT | Performed by: OBSTETRICS & GYNECOLOGY

## 2018-05-22 PROCEDURE — 82565 ASSAY OF CREATININE: CPT | Performed by: OBSTETRICS & GYNECOLOGY

## 2018-05-22 PROCEDURE — 0KQM0ZZ REPAIR PERINEUM MUSCLE, OPEN APPROACH: ICD-10-PCS | Performed by: OBSTETRICS & GYNECOLOGY

## 2018-05-22 PROCEDURE — 12000030 ZZH R&B OB INTERMEDIATE UMMC

## 2018-05-22 PROCEDURE — 00HU33Z INSERTION OF INFUSION DEVICE INTO SPINAL CANAL, PERCUTANEOUS APPROACH: ICD-10-PCS | Performed by: ANESTHESIOLOGY

## 2018-05-22 PROCEDURE — 84450 TRANSFERASE (AST) (SGOT): CPT | Performed by: OBSTETRICS & GYNECOLOGY

## 2018-05-22 PROCEDURE — 85027 COMPLETE CBC AUTOMATED: CPT | Performed by: OBSTETRICS & GYNECOLOGY

## 2018-05-22 PROCEDURE — 3E0R3BZ INTRODUCTION OF ANESTHETIC AGENT INTO SPINAL CANAL, PERCUTANEOUS APPROACH: ICD-10-PCS | Performed by: ANESTHESIOLOGY

## 2018-05-22 PROCEDURE — 25000132 ZZH RX MED GY IP 250 OP 250 PS 637: Performed by: OBSTETRICS & GYNECOLOGY

## 2018-05-22 RX ORDER — AMOXICILLIN 250 MG
1 CAPSULE ORAL 2 TIMES DAILY
Status: DISCONTINUED | OUTPATIENT
Start: 2018-05-22 | End: 2018-05-25 | Stop reason: HOSPADM

## 2018-05-22 RX ORDER — CARBOPROST TROMETHAMINE 250 UG/ML
250 INJECTION, SOLUTION INTRAMUSCULAR
Status: DISCONTINUED | OUTPATIENT
Start: 2018-05-22 | End: 2018-05-25 | Stop reason: HOSPADM

## 2018-05-22 RX ORDER — BISACODYL 10 MG
10 SUPPOSITORY, RECTAL RECTAL DAILY PRN
Status: DISCONTINUED | OUTPATIENT
Start: 2018-05-24 | End: 2018-05-25 | Stop reason: HOSPADM

## 2018-05-22 RX ORDER — OXYTOCIN/0.9 % SODIUM CHLORIDE 30/500 ML
340 PLASTIC BAG, INJECTION (ML) INTRAVENOUS CONTINUOUS PRN
Status: DISCONTINUED | OUTPATIENT
Start: 2018-05-22 | End: 2018-05-25 | Stop reason: HOSPADM

## 2018-05-22 RX ORDER — OXYTOCIN/0.9 % SODIUM CHLORIDE 30/500 ML
100 PLASTIC BAG, INJECTION (ML) INTRAVENOUS CONTINUOUS
Status: DISCONTINUED | OUTPATIENT
Start: 2018-05-22 | End: 2018-05-25 | Stop reason: HOSPADM

## 2018-05-22 RX ORDER — IBUPROFEN 400 MG/1
800 TABLET, FILM COATED ORAL EVERY 6 HOURS PRN
Status: DISCONTINUED | OUTPATIENT
Start: 2018-05-22 | End: 2018-05-25 | Stop reason: HOSPADM

## 2018-05-22 RX ORDER — LABETALOL HYDROCHLORIDE 5 MG/ML
20 INJECTION, SOLUTION INTRAVENOUS EVERY 10 MIN PRN
Status: DISCONTINUED | OUTPATIENT
Start: 2018-05-22 | End: 2018-05-25 | Stop reason: HOSPADM

## 2018-05-22 RX ORDER — ALBUTEROL SULFATE 90 UG/1
2 AEROSOL, METERED RESPIRATORY (INHALATION) EVERY 4 HOURS PRN
Status: DISCONTINUED | OUTPATIENT
Start: 2018-05-22 | End: 2018-05-25 | Stop reason: HOSPADM

## 2018-05-22 RX ORDER — MAGNESIUM SULFATE HEPTAHYDRATE 500 MG/ML
4 INJECTION, SOLUTION INTRAMUSCULAR; INTRAVENOUS
Status: DISCONTINUED | OUTPATIENT
Start: 2018-05-22 | End: 2018-05-25 | Stop reason: HOSPADM

## 2018-05-22 RX ORDER — LORAZEPAM 2 MG/ML
2 INJECTION INTRAMUSCULAR
Status: DISCONTINUED | OUTPATIENT
Start: 2018-05-22 | End: 2018-05-25 | Stop reason: HOSPADM

## 2018-05-22 RX ORDER — HYDROCODONE BITARTRATE AND ACETAMINOPHEN 5; 325 MG/1; MG/1
1 TABLET ORAL EVERY 4 HOURS PRN
Status: DISCONTINUED | OUTPATIENT
Start: 2018-05-22 | End: 2018-05-25 | Stop reason: HOSPADM

## 2018-05-22 RX ORDER — AMOXICILLIN 250 MG
2 CAPSULE ORAL 2 TIMES DAILY
Status: DISCONTINUED | OUTPATIENT
Start: 2018-05-22 | End: 2018-05-25 | Stop reason: HOSPADM

## 2018-05-22 RX ORDER — HYDROCORTISONE 2.5 %
CREAM (GRAM) TOPICAL 3 TIMES DAILY PRN
Status: DISCONTINUED | OUTPATIENT
Start: 2018-05-22 | End: 2018-05-25 | Stop reason: HOSPADM

## 2018-05-22 RX ORDER — ACETAMINOPHEN 325 MG/1
650 TABLET ORAL EVERY 4 HOURS PRN
Status: DISCONTINUED | OUTPATIENT
Start: 2018-05-22 | End: 2018-05-25 | Stop reason: HOSPADM

## 2018-05-22 RX ORDER — NIFEDIPINE 10 MG/1
10 CAPSULE ORAL
Status: DISCONTINUED | OUTPATIENT
Start: 2018-05-22 | End: 2018-05-25 | Stop reason: HOSPADM

## 2018-05-22 RX ORDER — OXYTOCIN 10 [USP'U]/ML
10 INJECTION, SOLUTION INTRAMUSCULAR; INTRAVENOUS
Status: DISCONTINUED | OUTPATIENT
Start: 2018-05-22 | End: 2018-05-25 | Stop reason: HOSPADM

## 2018-05-22 RX ORDER — MISOPROSTOL 200 UG/1
400 TABLET ORAL
Status: DISCONTINUED | OUTPATIENT
Start: 2018-05-22 | End: 2018-05-25 | Stop reason: HOSPADM

## 2018-05-22 RX ORDER — LABETALOL HYDROCHLORIDE 5 MG/ML
80 INJECTION, SOLUTION INTRAVENOUS EVERY 10 MIN PRN
Status: DISCONTINUED | OUTPATIENT
Start: 2018-05-22 | End: 2018-05-25 | Stop reason: HOSPADM

## 2018-05-22 RX ORDER — NALOXONE HYDROCHLORIDE 0.4 MG/ML
.1-.4 INJECTION, SOLUTION INTRAMUSCULAR; INTRAVENOUS; SUBCUTANEOUS
Status: DISCONTINUED | OUTPATIENT
Start: 2018-05-22 | End: 2018-05-25 | Stop reason: HOSPADM

## 2018-05-22 RX ORDER — MAGNESIUM SULFATE HEPTAHYDRATE 40 MG/ML
4 INJECTION, SOLUTION INTRAVENOUS
Status: DISCONTINUED | OUTPATIENT
Start: 2018-05-22 | End: 2018-05-25 | Stop reason: HOSPADM

## 2018-05-22 RX ORDER — LANOLIN 100 %
OINTMENT (GRAM) TOPICAL
Status: DISCONTINUED | OUTPATIENT
Start: 2018-05-22 | End: 2018-05-25 | Stop reason: HOSPADM

## 2018-05-22 RX ORDER — LABETALOL HYDROCHLORIDE 5 MG/ML
40 INJECTION, SOLUTION INTRAVENOUS EVERY 10 MIN PRN
Status: DISCONTINUED | OUTPATIENT
Start: 2018-05-22 | End: 2018-05-25 | Stop reason: HOSPADM

## 2018-05-22 RX ORDER — MONTELUKAST SODIUM 10 MG/1
10 TABLET ORAL AT BEDTIME
Status: DISCONTINUED | OUTPATIENT
Start: 2018-05-23 | End: 2018-05-25 | Stop reason: HOSPADM

## 2018-05-22 RX ADMIN — SODIUM CHLORIDE, POTASSIUM CHLORIDE, SODIUM LACTATE AND CALCIUM CHLORIDE: 600; 310; 30; 20 INJECTION, SOLUTION INTRAVENOUS at 02:19

## 2018-05-22 RX ADMIN — LIDOCAINE HYDROCHLORIDE 5 ML: 10 INJECTION, SOLUTION INFILTRATION; PERINEURAL at 06:14

## 2018-05-22 RX ADMIN — IBUPROFEN 800 MG: 400 TABLET ORAL at 08:22

## 2018-05-22 RX ADMIN — Medication 2.5 MILLION UNITS: at 02:19

## 2018-05-22 RX ADMIN — IBUPROFEN 800 MG: 400 TABLET ORAL at 16:21

## 2018-05-22 RX ADMIN — Medication 8 ML: at 05:35

## 2018-05-22 RX ADMIN — Medication 5 ML: at 02:51

## 2018-05-22 RX ADMIN — IBUPROFEN 800 MG: 400 TABLET ORAL at 22:48

## 2018-05-22 RX ADMIN — SENNOSIDES AND DOCUSATE SODIUM 1 TABLET: 8.6; 5 TABLET ORAL at 20:24

## 2018-05-22 RX ADMIN — Medication 20 MG: at 14:16

## 2018-05-22 RX ADMIN — ACETAMINOPHEN 650 MG: 325 TABLET ORAL at 21:36

## 2018-05-22 RX ADMIN — Medication 2.5 MILLION UNITS: at 05:54

## 2018-05-22 RX ADMIN — OXYTOCIN-SODIUM CHLORIDE 0.9% IV SOLN 30 UNIT/500ML 340 ML/HR: 30-0.9/5 SOLUTION at 06:06

## 2018-05-22 RX ADMIN — Medication 340 ML/HR: at 06:06

## 2018-05-22 NOTE — PLAN OF CARE
Problem: Patient Care Overview  Goal: Plan of Care/Patient Progress Review  Outcome: Declining  Patient is having elevated BP. Dr Box is aware. Received labetolol 20 mg IV. BP is being monitored every 10 mins. Breast feeding with assistance, nipple shield being used and hand expression of Breastmilk. Taking ibuprofen for pain. Continue to monitor BP and help with BF.

## 2018-05-22 NOTE — ANESTHESIA PREPROCEDURE EVALUATION
Anesthesia Evaluation        Cardiovascular Findings   (+) hypertension,     Neuro Findings - negative ROS    Pulmonary Findings   (+) asthma    Asthma  Control: well controlled      Skin Findings - negative skin ROS      GI/Hepatic/Renal Findings   (+) GERD    Endocrine/Metabolic Findings - negative ROS      Genetic/Syndrome Findings - negative genetics/syndromes ROS    Hematology/Oncology Findings - negative hematology/oncology ROS             Physical Exam  Normal systems: cardiovascular, pulmonary and dental    Airway   Mallampati: III  TM distance: >3 FB  Neck ROM: full    Dental     Cardiovascular   Rhythm and rate: regular and normal      Pulmonary    breath sounds clear to auscultation          Anesthesia Plan      History & Physical Review      ASA Status:  3 .    NPO Status:  Full stomach    Plan for Epidural          Postoperative Care      Consents        Procedure: * No surgery found *    HPI: Ruby Morrison is a 32 year old female who requests an epidural for labor pain.     PMHx/PSHx:  Past Medical History:   Diagnosis Date     Asthma      Cat allergies      GERD (gastroesophageal reflux disease)      Hx of previous reproductive problem      Hypertension     during pregnancy       Past Surgical History:   Procedure Laterality Date     HYMENOTOMY           No current facility-administered medications on file prior to encounter.   Current Outpatient Prescriptions on File Prior to Encounter:  ASPIRIN PO Take 81 mg by mouth   diclofenac (VOLTAREN) 1 % GEL topical gel Apply  2 grams to wrist area up to four times daily as needed using enclosed dosing card.   montelukast (SINGULAIR) 10 MG tablet Take 1 tablet (10 mg) by mouth At Bedtime   order for DME Equipment being ordered: Wrist brace   Prenatal Vit-Fe Fumarate-FA (PRENATAL VITAMIN PO)    albuterol (PROAIR HFA, PROVENTIL HFA, VENTOLIN HFA) 108 (90 BASE) MCG/ACT inhaler Inhale 2 puffs into the lungs every 4 hours as needed for shortness of breath /  dyspnea       Social Hx:   Social History   Substance Use Topics     Smoking status: Never Smoker     Smokeless tobacco: Never Used     Alcohol use No       Allergies: No Known Allergies      NPO Status: Per ASA Guidelines    Labs:    Blood Bank:  Lab Results   Component Value Date    ABO A 05/21/2018    RH Pos 05/21/2018    AS Neg 05/21/2018     BMP:  Recent Labs   Lab Test  05/21/18 0924   NA  139   POTASSIUM  3.7   CHLORIDE  106   CO2  20   BUN  9   CR  0.52   GLC  132*   CORIE  8.6     CBC:   Recent Labs   Lab Test  05/21/18 0924 05/11/18 0919   WBC   --   13.7*   RBC   --   4.29   HGB  12.6  12.6   HCT   --   39.0   MCV   --   91   MCH   --   29.4   MCHC   --   32.3   RDW   --   15.6*   PLT  255  264     Coags:  No results for input(s): INR, PTT, FIBR in the last 75823 hours.

## 2018-05-22 NOTE — PROGRESS NOTES
Follow up Evaluation:    Called to assess patients pain. Again it appears to be her left hip however she does state this is similar to last pregnancy. She states that the bolus did not help last time but the pain when asked to define she states it as pressure in her vagina, rectum, and left hip discomfort. Dermatomes were checked with ice by both myself and staff and found patient to have adequate T10 level. Discussed with OB staff. Patient is requesting cervical exam now given this pressure. We increased PCEA rate to 10mL/hr. Bolusing patient with 8mL of 0.125% Bupivacaine.          Zeb Hyatt MD  CA-2/PGY-3        I evaluated the patient separately from the resident and agree with the note above.     Mora Antoine MD

## 2018-05-22 NOTE — PROVIDER NOTIFICATION
05/22/18 1618   Provider Notification   Provider Name/Title Dr. Coronado   Method of Notification Electronic Page   Request Evaluate-Remote   Notification Reason Status Update     BP was 132/85.

## 2018-05-22 NOTE — PLAN OF CARE
Problem: Patient Care Overview  Goal: Plan of Care/Patient Progress Review  Outcome: Improving  Data: Ruby Morrison transferred to room 7131 via wheelchair at 0830. Baby transferred via parent's arms.  Action: Receiving unit notified of transfer: Yes. Patient and family notified of room change. This RN is keeping patient for now. Belongings sent to receiving unit. Oriented patient to surroundings. Call light within reach.   Response: Patient tolerated transfer and is stable.

## 2018-05-22 NOTE — PROVIDER NOTIFICATION
05/22/18 1611   Provider Notification   Provider Name/Title Dr. Coronado   Method of Notification Electronic Page   Request Evaluate-Remote   Notification Reason Medication Request;Vital Signs Change       Patient's BP was 169/104, will re-check in 15 minutes. IF high again, should another dose of labetalol be given or ordered another blood pressure med ( Hydralazine, Procardia?). Will textpage next BP.

## 2018-05-22 NOTE — PROGRESS NOTES
Patient had one more elevated BP of 166/104, but 132/85 on recheck.  HELLP labs WNL.    Saw patient, who reports she's feeling well.  Has no HA, vision changes or any other symptoms.  Discussed that if she has any more BP >160/110, would check catheterized urine specimen, but will continue to monitor for now.  Patient in agreement with plan.    Maria Elena Box MD

## 2018-05-22 NOTE — PROGRESS NOTES
[facility] DELIVERY PROGRESS NOTE:   May 22, 2018   0345         SUBJECTIVE:   Patient c/o discomfort.    Contractions:  q 3 minutes  Leakage of fluid:  Yes: clear  Vaginal bleeding:  No  Pain controlled:  No: epidural will be rebolused.            OBJECTIVE:      18   BP: 151/71 136/85   Resp:     Temp:     TempSrc:     SpO2: 93%    Weight:     Height:           NST:  Fetal Heart Rate Tracing:   Baseline: 130  Variability: Moderate  Accels    Tocometer: q 3 minutes    Abdomen:  Gravid, NT  Cervix:   Dilation: 9   Effacement: 90%   Station:0   Consistency: soft   Position: Anterior           LABS:   No results found for this or any previous visit (from the past 12 hour(s)).           ASSESSMENT:   32 year old  at 38w1d   induction of labor, indication chronic hypertension.           PLAN:   Anesthesia will rebolus epidural  Anticipate     Tomasa Bernal MD

## 2018-05-22 NOTE — PROGRESS NOTES
[facility] DELIV[facility] PROGRESS NOTE:   May 22, 2018   2:06 AM         SUBJECTIVE:   Patient c/o more pain with contractions, urge to push.    Contractions:  q 3 minutes  Leakage of fluid:  Yes: clear  Vaginal bleeding:  No  Pain controlled:  No, more pain with contractions, can't tolerate           OBJECTIVE:     Vitals:    18 2345 18 0015 18 0045 18 0115   BP: 136/72 125/58 134/71 155/70   Resp:       Temp:       TempSrc:       SpO2: 97% 98% 97%    Weight:       Height:             NST:  Fetal Heart Rate Tracing:   Baseline: 135  Variability: Moderate  Accels, no clear decels but tracing not always continuous    Tocometer: q 2-3 minutes. Pitocin 5 mu/min  Abdomen:  Gravid, NT    Cervix:   Dilation: 7   Effacement: 100%   Station:-2   Consistency: soft   Position: Mid. Fetal position cannot yet be assessed           LABS:   No results found for this or any previous visit (from the past 12 hour(s)).           ASSESSMENT:   32 year old  at 38w1d   active labor management and induction of labor, indication chronic hypertension.  Dilation progressing, station still high  Poor pain control with epidural           PLAN:   Expectant management, position changes  Will try to get epidural more effective for patient as she cannot push yet  Anticipate     Tomasa Bernal MD

## 2018-05-22 NOTE — PROGRESS NOTES
L & D Progress Note    SUBJECTIVE  Comfortable with epidural in place    OBJECTIVE  Vitals:    18 2209 18 2239 18 2309 18 2345   BP: 131/61 142/72 124/68 136/72   Resp:      Temp:       TempSrc:       SpO2: 97% 96% 96% 97%   Weight:       Height:         FHT:  Baseline 140; moderate variability; Decelerations: early; Accelerations: present;           Category: 1  [place]: ctx 4 in 10 mins  SVE: 5/80/-2    ASSESSMENT 32 year old  at 38w1d admitted for IOL secondary to chronic hypertension.  Pregnancy complicated by GBS positive, asthma, and morbid obesity. Labor progressing s/p AROM for augmentation.    PLAN    Chronic hypertension   -Admit HELLP labs wnl  -No signs/symptoms of preeclampsia   -Continue to monitor blood pressure    Asthma  -Continue singulair, albuterol PRN    Fetal well being  -Category 1  -Continuous fetal monitoring     Labor progress:  -IOL: s/p miso x1. S/p AROM. Continue pitocin augmentation  -Pain: Epidural in place  -GBS positive, adequate PCN  -Cervical check PRN       Ani Olguin MD   Resident Physician, PGY2  Obstetrics, Gynecology, and Women's Health

## 2018-05-22 NOTE — PROGRESS NOTES
Bolus evaluation:    Called to evaluate due to increasing pain with contractions. She states her left side was worse than her right. It was noted that she had been laying on her right side for quite some time so reminded to lay on the side that the pain is worse. Patient acknowledged this. Regardless bolus was given. 5 mL of 0.125% bupivacaine was injected incrementally with no signs of LAST. Pain is worse on left and also states there is more back tightness/pain. Epidural sight is unchanged and normal. Will reassess as needed however for now will work with PCEA and tilting to side of most discomfort.    Zeb Hyatt MD  CA-2/PGY-3.

## 2018-05-22 NOTE — L&D DELIVERY NOTE
Delivery Summary    Stage 1:  32 year old  presented at 38 weeks 0 days with induction for chronic hypertension. GBS positive. Misoprostol was given vaginally for one dose. She was then 4 cm and penicillin and pitocin were started. AROM was performed to clear fluid. She requested and received epidural. She progressed appropriately.     Stage 2:  Patient did not labor down as she felt too much urge to push. She pushed effectively. She delivered a viable female infant with Apgars 8/9 over a second degree laceration. Weight is pending. There was terminal meconium.   Delayed cord clamping was performed. Cord was clamped and cut.    Stage 3:  Placenta delivered rapidly with active management, intact, three vessel cord.   second degree laceration was repaired with 3-0 vicryl in standard fashion after infiltrating with 1% lidocaine.   ml.  Sponge and needle counts correct.    Expected date of discharge: 18    Tomasa Bernal MD    Ruby Morrison MRN# [id number]   Age: 32 year old YOB: 1985     Labor Event Times:    Labor Onset Date       Labor Onset Time    Dilation Complete Date    Dilation Complete Time       Start Pushing Date        Start Pushing Time            Labor Length:    1st Stage (hrs/min)     2nd Stage (hrs/min)     3rd Stage (hrs/min) 0.00 5.00       Labor Events:     Labor No   Rupture Date     Rupture Time     Rupture Type Artificial Rupture of Membranes   Fluid Color     Labor Type     Induction    Induction Indication         Augmentation    Labor Complications     Additional Complications     Management of Labor        Antibiotics     IV Antibiotic Given     Additional Management     Fetal Status Prior to  Delivery     Fetal Status Comments         Cervical Ripening:    Date     Time     Type         Delivery:    Episiotomy None   Local Anesthetic        Lacerations None  2nd   Sponge Count Correct       Needle Count Correct     Final Count by:    Sutures    "  Blood loss (ml)    Packing Intentionally Left In     Number     Comments           Information for the patient's :  Alton Morrison [[id number]]       Delivery  2018 6:06 AM by  Vaginal, Spontaneous Delivery  Sex:  female Gestational Age: 38w1d  Delivery Clinician:     Living?:            APGARS  One minute Five minutes Ten minutes   Skin color:            Heart rate:            Grimace:            Muscle tone:            Breathing:            Totals: 8  9         Presentation/position:           Resuscitation and Interventions: Method:  None  Oxygen Type:     Intubation Time:   # of Attempts:     ETT Size:        Tracheal Suction:     Tracheal returns:      [place] Care at Delivery:   baby to mothers abdomen for skin to skin. Dried and stimulated with lusty cry. Terminal mec.        Cord information:     Disposition of cord blood:      Blood gases sent?    Complications:     Placenta: Delivered:           appearance.  Comments:  .  Disposition: Hospital disposal  [place] Measurements:  Weight: 6 lb 4.5 oz (2850 g)  Height: 20.5\"  Head circumference: 35.6 cm  Chest circumference:     Temperature:     Other providers:       Additional  information:  Forceps:    Verbal Informed Consent Obtained:       Alternative Labor Strategies Discussed:     Emergency Resources Available:       Type:       Accrued Pulling Time:       # of Pulls:      Position:     Fetal Station:       Indications:      Other Indications:     Operative Vaginal Delivery Brief Note Forceps:        Vacuum:    Verbal Informed Consent Obtained:     Alternative Labor Strategies Discussed:     Emergency Resources Available:     Type:      Accrued Pulling Time:       # of Pop-Offs:       # of Pulls:       Position:     Fetal Station:      Indications for Vacuum:       Other Indications:    Operative Vaginal Delivery Brief Note Vacuum:        Shoulder Dystocia Shoulder Dystocia    No data filed           Breech:       : Type:    "  Indications for Primary:     Indications for Secondary:     Other Indications:        Observed anomalies     Output in Delivery Room: Stool

## 2018-05-22 NOTE — PLAN OF CARE
Problem: Patient Care Overview  Goal: Plan of Care/Patient Progress Review  Outcome: Improving  IOL for chronic hypertension. FHT in 140s minimal to moderate variability (see flowsheet). Ctx 5-6 mins. Pt had two severe range BPs this evening, but was overall stable. Pitocin started . GBS + with adequate treatment. Pt comfortable and sleeping. Plan is to perform AROM when pt wakes up. Support persons Kike (significant other) and Jenna (friend) at bedside. Anticipate . Continue with plan of care.

## 2018-05-22 NOTE — PROGRESS NOTES
Patient with severe range BP, treated with Labetalol 20mg IV x 1 with improvement to mild range.      170/103 prior to the following:  Vitals:    05/22/18 1333 05/22/18 1430 05/22/18 1440 05/22/18 1454   BP: (!) 161/105 (!) 150/95 (!) 153/98 (!) 157/97   Resp:       Temp:       TempSrc:       SpO2:       Weight:       Height:           Will cont to monitor closely.  Ordered HELLP labs.  Deferred UPC at this point, as it would require a cath specimen.  However, if she has any further severe range BP, would check that as well.    Maria Elena Box MD

## 2018-05-22 NOTE — PLAN OF CARE
Problem: Labor (Cervical Ripen, Induct, Augment) (Adult,Obstetrics,Pediatric)  Goal: Signs and Symptoms of Listed Potential Problems Will be Absent, Minimized or Managed (Labor)  Signs and symptoms of listed potential problems will be absent, minimized or managed by discharge/transition of care (reference Labor (Cervical Ripen, Induct, Augment) (Adult,Obstetrics,Pediatric) CPG).   Outcome: Improving  Pt requesting epidural for labor pain. MDA contacted. Consent form signed. IV fluid bolus administered. Pt positioned for procedure. Pt tolerated procedure well. Pt comfortable. Continue with plan of care.

## 2018-05-22 NOTE — PROGRESS NOTES
[facility] LAB[facility] DELIVERY PROGRESS NOTE:   May 22, 2018   5:32 AM         SUBJECTIVE:   Patient c/o too much urge to push.  Epidural has been bolused and rate increased   Contractions:  q 3 minutes  Leakage of fluid:  Yes: clear  Vaginal bleeding:  No  Pain controlled:  No           OBJECTIVE:     Vitals:    18 0316 18 0334 18 0400 18 0430   BP: 151/71 136/85 170/82 129/89   Resp:       Temp:       TempSrc:       SpO2: 93%  91% 98%   Weight:       Height:             NST:  Fetal Heart Rate Tracing:   Baseline: 135  Variability: Moderate  Accels, no decels    Tocometer: q 3 minutes, oxytocin 5 mu/min    Abdomen:  Gravid, NT  Cervix:   Dilation: thin anterior lip reduces with pushing   Effacement: 100%   Station:0   Consistency:    Position: OA           LABS:   No results found for this or any previous visit (from the past 12 hour(s)).           ASSESSMENT:   32 year old  at 38w1d   induction of labor, indication chronic hypertension.           PLAN:   Will start pushing due to patient discomfort    Tomasa Bernal MD

## 2018-05-22 NOTE — PROGRESS NOTES
[facility] DELIV[facility] PROGRESS NOTE:   May 21, 2018   10:28 PM         SUBJECTIVE:   Patient c/o nothing.    Contractions:  q 4-5 minutes  Leakage of fluid:  No  Vaginal bleeding:  No  Pain controlled:  Yes, epidural         OBJECTIVE:     Vitals:    184 18 2103 18 21418 2209   BP: 135/73 152/68 129/62 131/61   Temp:       TempSrc:       SpO2:  97% 98% 97%   Weight:       Height:             NST:  Fetal Heart Rate Tracing:   Baseline: 125  Variability: Moderate  Accels, no decels    Tocometer: q 4-5 minutes    Abdomen:  Gravid, NT  Cervix:   Dilation: 4   Effacement: 80%   Station:-2   Consistency: soft   Position: Mid  AROM performed to clear fluid         LABS:   No results found for this or any previous visit (from the past 12 hour(s)).           ASSESSMENT:   32 year old  at 38w0d   induction of labor, indication chronic hypertension and AROM.           PLAN:   Anticipate   Continue galindo Bernal MD

## 2018-05-22 NOTE — PLAN OF CARE
Problem: Patient Care Overview  Goal: Plan of Care/Patient Progress Review  Outcome: Improving   @ 0606 of baby girl. Epidural infusion stopped. Pt denies pain at this time. Fundus firm @ U

## 2018-05-22 NOTE — PROGRESS NOTES
Pt felling rectal pressure. Dr. Haddad notified. Pt complete and starting to push.  @ 0606 of viable baby girl. Terminal mec Infant vigorous. Placed skin to skin. Warm towels Delayed cord clamp

## 2018-05-23 PROCEDURE — 25000132 ZZH RX MED GY IP 250 OP 250 PS 637: Performed by: STUDENT IN AN ORGANIZED HEALTH CARE EDUCATION/TRAINING PROGRAM

## 2018-05-23 PROCEDURE — 25000132 ZZH RX MED GY IP 250 OP 250 PS 637: Performed by: OBSTETRICS & GYNECOLOGY

## 2018-05-23 PROCEDURE — 12000030 ZZH R&B OB INTERMEDIATE UMMC

## 2018-05-23 RX ORDER — NIFEDIPINE 30 MG/1
30 TABLET, EXTENDED RELEASE ORAL DAILY
Status: DISCONTINUED | OUTPATIENT
Start: 2018-05-23 | End: 2018-05-25

## 2018-05-23 RX ADMIN — SENNOSIDES AND DOCUSATE SODIUM 1 TABLET: 8.6; 5 TABLET ORAL at 08:26

## 2018-05-23 RX ADMIN — IBUPROFEN 800 MG: 400 TABLET ORAL at 05:34

## 2018-05-23 RX ADMIN — IBUPROFEN 800 MG: 400 TABLET ORAL at 11:35

## 2018-05-23 RX ADMIN — IBUPROFEN 800 MG: 400 TABLET ORAL at 21:19

## 2018-05-23 RX ADMIN — SENNOSIDES AND DOCUSATE SODIUM 1 TABLET: 8.6; 5 TABLET ORAL at 21:19

## 2018-05-23 RX ADMIN — MONTELUKAST SODIUM 10 MG: 10 TABLET, COATED ORAL at 23:00

## 2018-05-23 RX ADMIN — ACETAMINOPHEN 650 MG: 325 TABLET ORAL at 09:59

## 2018-05-23 RX ADMIN — NIFEDIPINE 30 MG: 30 TABLET, FILM COATED, EXTENDED RELEASE ORAL at 21:19

## 2018-05-23 NOTE — PLAN OF CARE
Problem: Patient Care Overview  Goal: Plan of Care/Patient Progress Review  Outcome: Improving  Patient has been stable this shift. BPs remain elevated, but not in treatable range. Patient had a headache earlier this morning but stated tylenol helped ease it. Patient denies any needs at this time. Will continue to monitor BPs closely. Patient denies any other vision changes or epigastric pain at this time.

## 2018-05-23 NOTE — LACTATION NOTE
This note was copied from a baby's chart.  Met with Ruby who was agreeable to lactation visit. She delivered her second daughter via vaginal delivery on 5/22/18 at 0606 at 38.1 weeks. She has a history of poor experience with her first daughter who was admitted to the NICU. She was able to make a full milk supply but stopped breastfeeding at 4 months due to latch difficulties. Pt shared with her nurse that wants minimal help with breastfeeding and she's reluctant to have lactation support; she did consent to LC visit. She has a history of chronic hypertension, PCOS, obesity, GERD and asthma. She noted breast changes during pregnancy and is able to hand express colostrum. She and her bedside RN are also seeing colostrum in the shield when baby comes off the breast. She denies discomfort and has been using a nipple shield due to bilateral smooth nipples. She used a shield with her last daughter as well and has been attempting to feed without. Baby has age appropriate output and her 24 hour weight loss was 3.8%.    Reviewed early feeding cues, benefits of feeding on demand, benefits of hand expression to support milk supply with nipple shield use and PCOS, and outpatient lactation resources. Ruby may want to consider adding pumping due to PCOS, but at this time prefers breastfeeding and hand expression after feedings. [facility]     Plan: Assist with feedings as pt requests. Continue to encourage hand expression after feedings. Recommend follow up with lactation RN at  [facility] if indicated at discharge.

## 2018-05-23 NOTE — DISCHARGE SUMMARY
[facility] Discharge Summary    Ruby Morrison MRN# [id number]   Age: 32 year old YOB: 1985     Date of Admission:  2018  Date of Discharge:  2018  Admitting Physician:  Tomasa Bernal MD  Discharge Physician:  Elham Merrill MD     Admit Dx:   -  at 38w1d   - Chronic hypertension   - GBS bacteruria   - Asthma  - Intramural fibroid     Discharge Dx:  - Same as above, s/p   -     Procedures:  - Spontaneous vaginal delivery  - Epidural analgesia    Admit HPI/Labor Course:  Ruby Morrison is a 32 year old  presented at 38 weeks 0 days with induction for chronic hypertension. GBS positive. Misoprostol was given vaginally for one dose. She was then 4 cm and penicillin and pitocin were started. AROM was performed to clear fluid. She requested and received epidural. She progressed appropriately. Patient did not labor down as she felt too much urge to push. She pushed effectively. She delivered a viable female infant with Apgars 8/9 over a second degree laceration. Weight is pending. There was terminal meconium. Delayed cord clamping was performed. Cord was clamped and cut.Placenta delivered rapidly with active management, intact, three vessel cord. Second degree laceration was repaired with 3-0 vicryl in standard fashion after infiltrating with 1% lidocaine.  ml.  Please see her Admission H&P and Delivery Summary for further details.    Postpartum Course:  Her postpartum course was complicated by elevated blood pressures.  On PPD#1, she was started on 30 mg procardia XL for BP control. On PPD#2, she was meeting all of her postpartum goals and deemed stable for discharge. She was voiding without difficulty, tolerating a regular diet without nausea and vomiting, her pain was well controlled on oral pain medicines and her lochia was appropriate. Her hemoglobin prior to delivery was 12.6 and after delivery was 11.7. Her Rh status was  positive, and Rhogam was not indicated.     Discharge Medications:   Ruby Morrison   Home Medication Instructions JULIETTE:[id number]    Printed on:05/24/18 6028   Medication Information                      albuterol (PROAIR HFA, PROVENTIL HFA, VENTOLIN HFA) 108 (90 BASE) MCG/ACT inhaler  Inhale 2 puffs into the lungs every 4 hours as needed for shortness of breath / dyspnea             ASPIRIN PO  Take 81 mg by mouth             calcium carbonate (TUMS) 500 MG chewable tablet  Take 2 chew tab by mouth 3 times daily             diclofenac (VOLTAREN) 1 % GEL topical gel  Apply  2 grams to wrist area up to four times daily as needed using enclosed dosing card.             montelukast (SINGULAIR) 10 MG tablet  Take 1 tablet (10 mg) by mouth At Bedtime             NIFEdipine ER osmotic (ADALAT CC) 30 MG TB24  Take 1 tablet (30 mg) by mouth daily             order for DME  Equipment being ordered: Wrist brace             Prenatal Vit-Fe Fumarate-FA (PRENATAL VITAMIN PO)                     Discharge/Disposition:  Ruby Morrison was discharged to home in stable condition with the following instructions/medications:  1) Call for temperature > 100.4, bright red vaginal bleeding >1 pad an hour x 2 hours, foul smelling vaginal discharge, pain not controlled by usual oral pain meds, persistent nausea and vomiting not controlled on medications  2) She was undecided re: contraception.  3) For feeding she decided to breastfeed..  4) She was instructed to follow-up with her primary OB in 1 week for a blood pressure check and in 6 weeks for a routine postpartum visit and contraception consult .    # Discharge Pain Plan:   - During her hospitalization, Ruby experienced pain due to vaginal delivery.  The pain plan for discharge was discussed with Ruby and the plan was created in a collaborative fashion.    - Pharmacologic adjuvants:  NSAIDs and Acetaminophen    Elham Merrill MD

## 2018-05-23 NOTE — PLAN OF CARE
Problem: Patient Care Overview  Goal: Plan of Care/Patient Progress Review  Outcome: Improving  VSS. Bonding well with baby. Breastfeeding with some assist from staff to achieve and maintain latch. Using nipple shield d/t smooth nipples. Baby is initially frantic at breast; after 5-10min at breast baby calms down enough to suck.     BPs 130s-140s/80s this shift. Pain controlled with ibuprofen. Ambulating independently. Voiding without difficulty. Dad at bedside for support. Continue with plan of care.

## 2018-05-23 NOTE — PROVIDER NOTIFICATION
05/23/18 1236   Provider Notification   Provider Name/Title Dr Stiles   Method of Notification Electronic Page   Notification Reason Vital Signs Change;Status Update   Pt's BP elevated 165/102, repeat check 158/94

## 2018-05-23 NOTE — PROGRESS NOTES
S; Feeling good, a little sore.  Lochia slowing.  Urinating Ok.  Breastfeeding, baby quite sleepy, so working with latch.    O: BP (!) 153/105  Pulse 86  Temp 97.9  F (36.6  C) (Oral)  Resp 16  Ht 1.524 m (5')  Wt 107.5 kg (237 lb)  SpO2 98%  Breastfeeding? Unknown  BMI 46.29 kg/m2     Abd: SNT, fundus firm  Ex: no calf tenderness    Hgb: 11.7    A/P:  1) PPD#1 S/P  after IOL for chronic HTN   Doing well.  Overall Bps mild range, labetalol x 1 yesterday for severe range.  Will monitor closely. Continue routine cares.  D/C in am.    TITA ALEXANDER MD

## 2018-05-24 PROCEDURE — 12000028 ZZH R&B OB UMMC

## 2018-05-24 PROCEDURE — 25000132 ZZH RX MED GY IP 250 OP 250 PS 637: Performed by: OBSTETRICS & GYNECOLOGY

## 2018-05-24 PROCEDURE — 25000132 ZZH RX MED GY IP 250 OP 250 PS 637: Performed by: STUDENT IN AN ORGANIZED HEALTH CARE EDUCATION/TRAINING PROGRAM

## 2018-05-24 RX ORDER — NIFEDIPINE 30 MG/1
60 TABLET, EXTENDED RELEASE ORAL DAILY
Status: DISCONTINUED | OUTPATIENT
Start: 2018-05-25 | End: 2018-05-25 | Stop reason: HOSPADM

## 2018-05-24 RX ORDER — OXYCODONE HYDROCHLORIDE 5 MG/1
10 TABLET ORAL EVERY 4 HOURS PRN
Status: DISCONTINUED | OUTPATIENT
Start: 2018-05-24 | End: 2018-05-25 | Stop reason: HOSPADM

## 2018-05-24 RX ORDER — NIFEDIPINE 30 MG/1
30 TABLET, EXTENDED RELEASE ORAL ONCE
Status: COMPLETED | OUTPATIENT
Start: 2018-05-24 | End: 2018-05-24

## 2018-05-24 RX ORDER — NIFEDIPINE 30 MG
30 TABLET, EXTENDED RELEASE ORAL DAILY
Qty: 50 TABLET | Refills: 0 | Status: SHIPPED | OUTPATIENT
Start: 2018-05-25 | End: 2018-05-29

## 2018-05-24 RX ADMIN — ACETAMINOPHEN, ASPIRIN AND CAFFEINE 2 TABLET: 250; 250; 65 TABLET, FILM COATED ORAL at 11:54

## 2018-05-24 RX ADMIN — SENNOSIDES AND DOCUSATE SODIUM 2 TABLET: 8.6; 5 TABLET ORAL at 08:27

## 2018-05-24 RX ADMIN — ACETAMINOPHEN 650 MG: 325 TABLET ORAL at 18:13

## 2018-05-24 RX ADMIN — SENNOSIDES AND DOCUSATE SODIUM 1 TABLET: 8.6; 5 TABLET ORAL at 19:41

## 2018-05-24 RX ADMIN — NIFEDIPINE 30 MG: 30 TABLET, FILM COATED, EXTENDED RELEASE ORAL at 08:27

## 2018-05-24 RX ADMIN — MONTELUKAST SODIUM 10 MG: 10 TABLET, COATED ORAL at 21:52

## 2018-05-24 RX ADMIN — OXYCODONE HYDROCHLORIDE 10 MG: 5 TABLET ORAL at 14:28

## 2018-05-24 RX ADMIN — NIFEDIPINE 30 MG: 30 TABLET, FILM COATED, EXTENDED RELEASE ORAL at 08:58

## 2018-05-24 RX ADMIN — IBUPROFEN 800 MG: 400 TABLET ORAL at 05:13

## 2018-05-24 RX ADMIN — ACETAMINOPHEN 650 MG: 325 TABLET ORAL at 21:52

## 2018-05-24 RX ADMIN — IBUPROFEN 800 MG: 400 TABLET ORAL at 10:57

## 2018-05-24 RX ADMIN — IBUPROFEN 800 MG: 400 TABLET ORAL at 17:07

## 2018-05-24 NOTE — PROVIDER NOTIFICATION
Focus: Headache  D: Still has headache. Pounding at times. Denies epigastric pain and vision changes. Last bp 140/91. Worse when up. I: Dr. Merrill updated. P: Continue to monitor.  Oxycodone per order.

## 2018-05-24 NOTE — PROVIDER NOTIFICATION
Focus: Headache  D: Still has headache. Denies any other signs of preclampsia. Takes excedrine migraine for headache. P: Continue to monitor.

## 2018-05-24 NOTE — PLAN OF CARE
Problem: Patient Care Overview  Goal: Plan of Care/Patient Progress Review  Data: -150/90s. Nifedipine started at 2100 last night.  Denies headache/vision changes/epigastric pain/SOB. Postpartum checks within normal limits - see flow record. Patient eating and drinking normally. Patient able to empty bladder independently and is up ambulating. No apparent signs of infection. Perineum healing well. Patient performing self cares and is able to care for infant.  Action: Patient medicated during the shift for cramping. See MAR. Patient reassessed within 1 hour after each medication and pain was improved - patient stated she was comfortable. Patient education done about finishing required docs for discharge. See flow record.  Response: Positive attachment behaviors observed with infant. Support persons  present.   Plan: Anticipate discharge today.

## 2018-05-24 NOTE — PROVIDER NOTIFICATION
Focus: Physio  D: Complaining of headache. Had some nausea earlier this am. Denies epigastric pain. Negative clonus. Reflexes normal. I: Dr. Merrill notified. P: Increase Nifedipine. Cancel discharge for now.

## 2018-05-24 NOTE — PROGRESS NOTES
May 24, 2018       [facility] MD Postpartum Progress Note:       DAILY NOTE - POSTPARTUM DAY 2    SUBJECTIVE: feeling well and ready for discharge     Pain controlled? Yes  Tolerating a regular diet? YES  Ambulating? YES  Voiding without difficulty? Yes  Lochia? minimal  Breastfeeding:  yes  Desired contraception? unsure    OBJECTIVE:  Vitals:    05/24/18 0500 05/24/18 0616 05/24/18 0820 05/24/18 0833   BP: (!) 132/97  (!) 155/102 (!) 138/97   Pulse: 107      Resp: 20 19    Temp:   97.7  F (36.5  C)    TempSrc:   Oral    SpO2:       Weight:  103 kg (227 lb)     Height:           Constitutional: healthy, alert and no distress    Abdomen:  Uterine fundus is firm, non-tender and at the level of the umbilicus      LE:  trace edema, non-tender calves     LABS:  Hemoglobin   Date Value Ref Range Status   05/22/2018 11.7 11.7 - 15.7 g/dL Final   05/21/2018 12.6 11.7 - 15.7 g/dL Final     No results found for: RUBELLAABIGG   Lab Results   Component Value Date    ABO A 05/21/2018           Lab Results   Component Value Date    RH Pos 05/21/2018        ASSESSMENT:  Post-partum day #2  Normal spontaneous vaginal delivery.  Chronic hypertension, BP in mild HBP range with Procardia XL 30 mg po daily   Doing well.     PLAN:   Discharge later today on Procardia XL 30 mg po daily.  Bp check in one week.    RTC 6 weeks for postpartum visit and contraception management     Elham Merrill MD

## 2018-05-24 NOTE — PROGRESS NOTES
Patient seen for headache.  History of migranes.  Notes bifrontal headache.  Pounding with standing, better lying down.  No neurologic deficit or visual disturbance.    BP (!) 140/91  Pulse 107  Temp 97.7  F (36.5  C) (Oral)  Resp 19  Ht 1.524 m (5')  Wt 103 kg (227 lb)  SpO2 98%  Breastfeeding? Unknown  BMI 44.33 kg/m2    HEENT:  PERRL  EOMs intact    Reflexes 2+ at knees no clonus  Normal mild pedal edema    Headache, dehydration, postural component.  Not pre-eclampsia.  Reflexes were normal.  Wound not benefit from MagSO4.  Hydrate, po narcotic.    Elham Merrill MD

## 2018-05-24 NOTE — PLAN OF CARE
Problem: Patient Care Overview  Goal: Plan of Care/Patient Progress Review  Outcome: No Change  Vital signs stable. Started on Nifedipine XL this evening. Postpartum assessment WDL. Pain well controlled with Ibuprofen. Patient voiding without difficulty. Breastfeeding on cue and using nipple shield independently. Patient and  bonding well. Will continue with current plan of care.

## 2018-05-25 VITALS
RESPIRATION RATE: 16 BRPM | HEART RATE: 101 BPM | SYSTOLIC BLOOD PRESSURE: 153 MMHG | DIASTOLIC BLOOD PRESSURE: 103 MMHG | BODY MASS INDEX: 44.17 KG/M2 | OXYGEN SATURATION: 94 % | HEIGHT: 60 IN | TEMPERATURE: 98 F | WEIGHT: 225 LBS

## 2018-05-25 PROCEDURE — 25000132 ZZH RX MED GY IP 250 OP 250 PS 637: Performed by: OBSTETRICS & GYNECOLOGY

## 2018-05-25 RX ADMIN — IBUPROFEN 800 MG: 400 TABLET ORAL at 09:01

## 2018-05-25 RX ADMIN — SENNOSIDES AND DOCUSATE SODIUM 1 TABLET: 8.6; 5 TABLET ORAL at 11:40

## 2018-05-25 RX ADMIN — ACETAMINOPHEN, ASPIRIN AND CAFFEINE 2 TABLET: 250; 250; 65 TABLET, FILM COATED ORAL at 01:34

## 2018-05-25 RX ADMIN — NIFEDIPINE 60 MG: 30 TABLET, FILM COATED, EXTENDED RELEASE ORAL at 09:01

## 2018-05-25 RX ADMIN — IBUPROFEN 800 MG: 400 TABLET ORAL at 01:13

## 2018-05-25 NOTE — PROVIDER NOTIFICATION
Correction of previous text page to Dr Merrill. Dr Barrera on maryan and text page sent regarding medication order.

## 2018-05-25 NOTE — PLAN OF CARE
Problem: Patient Care Overview  Goal: Plan of Care/Patient Progress Review  Outcome: Adequate for Discharge Date Met: 18  Postpartum discharge instructions reviewed and pt verbalized understanding. Also, received discharge medications. Independent with self and baby cares. Breastfeeding  on demand using a nipple shield. Family bonding. Mother and baby discharged today and states support available.2018

## 2018-05-25 NOTE — PROGRESS NOTES
Late Entry    1:20 pm Nurse Manager () met with Ruby and FOB this after noon for close to 45-60 minutes.    had stopped in to meet with family yesterday 5/24/17, but on both attempts mother was sleeping and FOB requested that they both would like to follow up to provider feedback on their labor experience.      Per Couple, their stay currently is going well on [facility], however they reported concerns with their nursing care and pain management while on Labor and delivery.  This concern was also voiced by a friend of the family, Jenna, who is also a labor nurseand had been present during the labor per the couple.  Jenna had also reached out  With her concerns and sent a web notification to patient relations    Per the couple, they understood that their labor would be different secondary to Ruby being induced for chronic hypertension and not coming to the [facility] in Labor.  They also acknowledged that their first daughter had to stay around two weeks in the NICU post delivery and so were also anxious about having to go through this experience again.     Per the couple, they were concerned that the Nurse, Nicolasa,providing the care on the night of the delivery, was not providing adequate labor support and making sure that the fetal monitor was reading properly after she would come in and adjust it.  FOB reported that many times he felt that he or Jenna was having to hold it.  They also were concerned that the Nurse was unaware of the pain that the patient was feeling post epidural, which was a different experience than Ruby had with the first epidural, which seemed to provide her with more relief.  Couple reported that Dr. Bernal had also discussed with the couple a change in epidural concentration and were concerned that they were possibly not being given an adequate amount similar to their first delivery.      Nurse Manager did apologize for their experience and did let the couple know that she  would follow up both with the Nurse that cared for them, the Anesthesia team, as well as her primary provider.  Nurse Manager attempted to support a visit from the Anesthesia team prior to discharge, but intervention was not possible secondary to timing.  Nurse  Manager also left her office number for friend, Jenna, if she wanted to also provide a phone conversation with feedback.    Nurse manager did recognize the time that the couple provided to give feedback to support improvement.

## 2018-05-25 NOTE — DISCHARGE INSTRUCTIONS
Call your health care provider if you have any of these symptoms:        Keep your hands clean:  Always wash your hands before touching your perineal area and stitches.  This helps reduce your risk of infection.  If your hands aren't dirty, you may use an alcohol hand-rub to clean your hands. Keep your nails clean and short.    Postpartum Vaginal Delivery Instructions    Activity       Ask family and friends for help when you need it.    Do not place anything in your vagina for 6 weeks.    You are not restricted on other activities, but take it easy for a few weeks to allow your body to recover from delivery.  You are able to do any activities you feel up to that point.    No driving until you have stopped taking your pain medications (usually two weeks after delivery).     Call your health care provider if you have any of these symptoms:       Increased pain, swelling, redness, or fluid around your stiches from an episiotomy or perineal tear.    A fever above 100.4 F (38 C) with or without chills when placing a thermometer under your tongue.    You soak a sanitary pad with blood within 1 hour, or you see blood clots larger than a golf ball.    Bleeding that lasts more than 6 weeks.    Vaginal discharge that smells bad.    Severe pain, cramping or tenderness in your lower belly area.    A need to urinate more frequently (use the toilet more often), more urgently (use the toilet very quickly), or it burns when you urinate.    Nausea and vomiting.    Redness, swelling or pain around a vein in your leg.    Problems breastfeeding or a red or painful area on your breast.    Chest pain and cough or are gasping for air.    Problems coping with sadness, anxiety, or depression.  If you have any concerns about hurting yourself or the baby, call your provider immediately.     You have questions or concerns after you return home.     Keep your hands clean:  Always wash your hands before touching your perineal area and  stitches.  This helps reduce your risk of infection.  If your hands aren't dirty, you may use an alcohol hand-rub to clean your hands. Keep your nails clean and short.      Follow up in 1 week for blood pressure check up or sooner as discussed with your OB/GYN doctor.   Follow up in 6 weeks for post delivery check up.

## 2018-05-25 NOTE — PLAN OF CARE
Problem: Patient Care Overview  Goal: Plan of Care/Patient Progress Review  BP elevated, 140-150s/90-100s. Reports headache improved from yesterday; taking Excedrin and Ibuprofen with relief. Breastfeeding independently with nipple shield. Encouraged pt to hand express after feeds and to increase feeding frequency due to high int bilirubin for baby.  Independent with cares.  Possible discharge today, pending BP/baby bili.

## 2018-05-25 NOTE — PLAN OF CARE
Problem: Patient Care Overview  Goal: Plan of Care/Patient Progress Review  Outcome: Improving  VSS. Postpartum assessment WNL. C/o headache but says it is not as bad as it was earlier and that it is not getting worse. BP stable. Anesthesia came to assess patient for possible spinal headache. Pain controlled with tylenol and ibuprofen. Breastfeeding independently with breast shield. Encouraged pt to hand express after feeds and to increase feeding frequency due to HIR bilirubin. Encouraged pt to watch educational videos. Spouse present and attentive.

## 2018-05-25 NOTE — PROVIDER NOTIFICATION
05/25/18 0747   Provider Notification   Provider Name/Title G2 Lola   Method of Notification Electronic Page   Request Evaluate-Remote   Notification Reason Other   Order for both 30mg and 60mg Nifedipine, please clarify which order to use. Thanks [id number]

## 2018-05-25 NOTE — PROGRESS NOTES
Anesthesia Evaluation for possible post dural puncture headache:    Called to evaluate Mrs. Morrison for a persistent headache. When discussing with patient she states it is nothing new for her to get a headache as she has chronic headaches. In light of this she does state that this headache is somewhat different. She states it moves around and throbs at times. She is currently sitting up and states that after her last does of oxycodone the headache has gone away and she is sitting up in bed and comfortable. When asked if she can lay down flat she states that makes it worse and that she needs to have her head propped up which really decreases my suspicion of a post dural puncture headache. We discussed signs and symptoms of a post dural puncture headache and felt that she does not seem to meet criteria/clinical suspicion at this time. We will not place a blood patch tonight given low suspicion. We would suggest conservative management at this time and reevaluate as needed. I understand this is delaying discharge from today but given signs and symptoms it appears to be chronic in nature exacerbated by labor.    Discussed with Staff Anesthesiologist, Dr. Rodríguez.    Zeb Hyatt MD  CA-2/PGY-3.

## 2018-05-25 NOTE — PROGRESS NOTES
[facility] Obstetrics Post-Partum Progress Note     Postpartum day # 3    SUBJECTIVE:   No complaints.  Feeling well and ready for d/c today.  Bleeding is appropriate.   Breast feeding going ok.  BP responds well to procardia XL 60 mg.           Physical Exam:      Vitals:    05/24/18 1621 05/24/18 2012 05/25/18 0113 05/25/18 0535   BP: 122/78 127/84 (!) 152/93 (!) 141/100   BP Location: Right arm      Pulse: 81 99  111   Resp: 17  18    Temp: 97.7  F (36.5  C)  98.2  F (36.8  C)    TempSrc: Oral  Oral    SpO2: 94%      Weight:    102.1 kg (225 lb)   Height:          Gen:  NAD,   normal respiratory and cardiovascular effort   ABD:  Soft, NT   Uterine fundus is firm, non-tender and at the level of the umbilicus  bilateral LE's: 1+ edema, nontender    Hemoglobin   Date Value Ref Range Status   05/22/2018 11.7 11.7 - 15.7 g/dL Final   05/21/2018 12.6 11.7 - 15.7 g/dL Final   ]           Assessment and Plan:    Doing well PPD # 3  BP's improved on higher dose of procardia.  Patient stable for d/c on procardia 60 MG XL.   Plan D/C today with follow up for BP check next week.   discussed side effects of elevated BP, low BP and when to call.    Discussed postpartum instructions/restrictions and follow up.   Patient plans to discuss contraception at 6 wk PP visit.      Ana María Barrera MD

## 2018-05-29 ENCOUNTER — OFFICE VISIT (OUTPATIENT)
Dept: FAMILY MEDICINE | Facility: CLINIC | Age: 33
End: 2018-05-29
Payer: COMMERCIAL

## 2018-05-29 VITALS
WEIGHT: 221 LBS | DIASTOLIC BLOOD PRESSURE: 88 MMHG | BODY MASS INDEX: 43.39 KG/M2 | HEIGHT: 60 IN | HEART RATE: 123 BPM | TEMPERATURE: 98.4 F | SYSTOLIC BLOOD PRESSURE: 122 MMHG

## 2018-05-29 DIAGNOSIS — I10 CHRONIC HYPERTENSION: ICD-10-CM

## 2018-05-29 PROBLEM — B95.1 GROUP B STREPTOCOCCUS URINARY TRACT INFECTION AFFECTING PREGNANCY IN FIRST TRIMESTER: Status: RESOLVED | Noted: 2017-11-01 | Resolved: 2018-05-29

## 2018-05-29 PROBLEM — O23.41 GROUP B STREPTOCOCCUS URINARY TRACT INFECTION AFFECTING PREGNANCY IN FIRST TRIMESTER: Status: RESOLVED | Noted: 2017-11-01 | Resolved: 2018-05-29

## 2018-05-29 PROCEDURE — 99213 OFFICE O/P EST LOW 20 MIN: CPT | Performed by: FAMILY MEDICINE

## 2018-05-29 RX ORDER — NIFEDIPINE 30 MG
60 TABLET, EXTENDED RELEASE ORAL DAILY
COMMUNITY
Start: 2018-05-29 | End: 2019-10-31

## 2018-05-29 NOTE — PROGRESS NOTES
SUBJECTIVE:   Ruby Morrison is a 32 year old female who presents to clinic today for the following health issues:    Chief Complaint   Patient presents with     Hypertension     The patient is a 32-year-old female, who is postpartum day #8.  Patient had elevated blood pressures throughout her recent pregnancy.  Patient states she did not have preeclampsia, but her blood pressure was quite elevated postpartum, treated with labetalol.  OB started her on Nifedipine ER 30 mg daily 05/22/2018, postpartum day #2.  This was subsequently increased to Nifedipine ER 60 mg daily 05/24/2018.  BMP was within normal limits 05/21/2018.  ALT, AST, platelets, and follow-up Creatinine were within normal limits 05/22/2018.  Patient is here for a blood pressure recheck, as recommended by OB.  Patient denies headaches, vision changes, chest pain, shortness of breath, abdominal pain, lightheadedness, medication side effects, or change in her chronic lower extremity edema.  No bowel/bladder changes.  Patient is breast-feeding, without difficulty.  Her 2-year-old daughter is also doing well.    Problem list and histories reviewed & adjusted, as indicated.  Additional history: as documented    Patient Active Problem List   Diagnosis     Mild persistent asthma     Environmental allergies     GERD (gastroesophageal reflux disease)     CARDIOVASCULAR SCREENING; LDL GOAL LESS THAN 130     Obesity     Abnormal uterine bleeding     PCOS (polycystic ovarian syndrome)     Chronic hypertension     Past Surgical History:   Procedure Laterality Date     HYMENOTOMY         Social History   Substance Use Topics     Smoking status: Never Smoker     Smokeless tobacco: Never Used     Alcohol use No     Family History   Problem Relation Age of Onset     Migraines Mother      Hypertension Father      Lipids Father      DIABETES Maternal Grandmother      DIABETES Paternal Grandmother      CANCER Paternal Grandfather      luekemia         Current  Outpatient Prescriptions   Medication Sig Dispense Refill     albuterol (PROAIR HFA, PROVENTIL HFA, VENTOLIN HFA) 108 (90 BASE) MCG/ACT inhaler Inhale 2 puffs into the lungs every 4 hours as needed for shortness of breath / dyspnea 1 Inhaler 1     diclofenac (VOLTAREN) 1 % GEL topical gel Apply  2 grams to wrist area up to four times daily as needed using enclosed dosing card. 100 g 1     montelukast (SINGULAIR) 10 MG tablet Take 1 tablet (10 mg) by mouth At Bedtime 90 tablet 1     NIFEdipine ER (ADALAT CC) 30 MG TB24 Take 2 tablets (60 mg) by mouth daily       order for DME Equipment being ordered: Wrist brace 1 each 0     Prenatal Vit-Fe Fumarate-FA (PRENATAL VITAMIN PO)        ASPIRIN PO Take 81 mg by mouth       calcium carbonate (TUMS) 500 MG chewable tablet Take 2 chew tab by mouth 3 times daily              No Known Allergies    Reviewed and updated as needed this visit by clinical staff  Tobacco  Allergies  Meds  Problems  Med Hx  Surg Hx  Fam Hx  Soc Hx        Reviewed and updated as needed this visit by Provider  Tobacco  Allergies  Meds  Problems  Med Hx  Surg Hx  Fam Hx  Soc Hx          ROS:  See above.    OBJECTIVE:     /88  Pulse 123  Temp 98.4  F (36.9  C) (Oral)  Ht 5' (1.524 m)  Wt 221 lb (100.2 kg)  Breastfeeding? Yes  BMI 43.16 kg/m2  Body mass index is 43.16 kg/(m^2).    GENERAL APPEARANCE:  Awake, alert, and in no acute distress.  PSYCHIATRIC:  Slightly irritated affect.  HEENT:  Sclera anicteric.  No conjunctivitis.  PERRLA.  No erythema, edema, or exudates of the oral mucosa or posterior pharynx.  Mucous membranes moist.  NECK:  Spontaneous full range of motion.  No thyromegaly or mass.  No lymphadenopathy.  HEART:  Normal S1, S2.  Regular rate and rhythm.  No tachycardia noted at the time of physician evaluation.  No murmurs, rubs, or gallops.  LUNGS:  No respiratory distress.  No wheezes, rales, or rhonchi.  ABDOMEN:  Not distended.  Obese.  Soft.  Not tender.  No  mass.  EXTREMITIES:  Moves 4 extremities.   Trace edema to mid calf level.  NEUROLOGIC:  Gait within normal limits.  No facial droop or acute neurologic deficits.    ASSESSMENT/PLAN:     1. Chronic hypertension, stable on current dose of Nifedipine ER, as started postpartum day #2.  Patient is currently postpartum day #8, without current concern for postpartum preeclampsia.    - NIFEdipine ER (ADALAT CC) 30 MG TB24; Take 2 tablets (60 mg) by mouth daily  - Basic metabolic panel; Future    Patient Instructions   -Recheck BMP (lab) and blood pressure in 2 weeks, as discussed.  -Nurse visit OK for the 2 week blood pressure check, as discussed.  -6 week postpartum exam to be scheduled with OB/GYN.  -Follow immediately if emergent symptoms develop (e.g. headache, vision changes, swelling), as discussed.      Chetna Randhawa MD  [facility]

## 2018-05-29 NOTE — MR AVS SNAPSHOT
After Visit Summary   5/29/2018    Ruby Morrison    MRN: [id number]           Patient Information     Date Of Birth          1985        Visit Information        Provider Department      5/29/2018 8:40 AM Chetna Randhawa MD [facility]        Today's Diagnoses     Chronic hypertension          Care Instructions    -Recheck BMP (lab) and blood pressure in 2 weeks, as discussed.  -Nurse visit OK for the 2 week blood pressure check, as discussed.  -6 week postpartum exam to be scheduled with OB/GYN.  -Follow immediately if emergent symptoms develop (e.g. headache, vision changes, swelling), as discussed.            Follow-ups after your visit        Future tests that were ordered for you today     Open Future Orders        Priority Expected Expires Ordered    Basic metabolic panel Routine 6/11/2018 5/29/2019 5/29/2018            Who to contact     If you have questions or need follow up information about today's clinic visit or your schedule please contact [facility] directly at [phone].  Normal or non-critical lab and imaging results will be communicated to you by [employer], letter or phone within 4 business days after the clinic has received the results. If you do not hear from us within 7 days, please contact the clinic through [employer] or phone. If you have a critical or abnormal lab result, we will notify you by phone as soon as possible.  Submit refill requests through [employer] or call your pharmacy and they will forward the refill request to us. Please allow 3 business days for your refill to be completed.          Additional Information About Your Visit        [employer] Information     [employer] gives you secure access to your electronic health record. If you see a primary care provider, you can also send messages to your care team and make appointments. If you have questions, please call your primary care clinic.  If you do not have a  primary care provider, please call [phone] and they will assist you.        Care EveryWhere ID     This is your Care EveryWhere ID. This could be used by other organizations to access your [facility] medical records  [id number]        Your Vitals Were     Pulse Temperature Height Breastfeeding? BMI (Body Mass Index)       123 98.4  F (36.9  C) (Oral) 5' (1.524 m) Yes 43.16 kg/m2        Blood Pressure from Last 3 Encounters:   05/29/18 122/88   05/25/18 (!) 153/103   05/16/18 (!) 140/92    Weight from Last 3 Encounters:   05/29/18 221 lb (100.2 kg)   05/25/18 225 lb (102.1 kg)   05/16/18 237 lb 6.4 oz (107.7 kg)                 Today's Medication Changes          These changes are accurate as of 5/29/18 11:59 PM.  If you have any questions, ask your nurse or doctor.               These medicines have changed or have updated prescriptions.        Dose/Directions    NIFEdipine ER 30 MG Tb24   Commonly known as:  ADALAT CC   This may have changed:  how much to take   Used for:  Chronic hypertension   Changed by:  Chetna Randhawa MD        Dose:  60 mg   Take 2 tablets (60 mg) by mouth daily   Refills:  0                Primary Care Provider Office Phone # Fax #    Mesha John [phone]       [address] [facility] [address]        Equal Access to Services     [facility]: Hadii ivy pazo Sodarnell, waaxda luqadaha, qaybta kaalmada clemente, tosin vaca. So [facility] [phone].    ATENCIÓN: Si habla español, tiene a mendez disposición servicios gratuitos de asistencia lingüística. Llame al [phone].    We comply with applicable federal civil rights laws and [place] laws. We do not discriminate on the basis of race, color, national origin, age, disability, sex, sexual orientation, or gender identity.            Thank you!     Thank you for choosing [facility]  for your care. Our goal is always to provide you with  excellent care. Hearing back from our patients is one way we can continue to improve our services. Please take a few minutes to complete the written survey that you may receive in the mail after your visit with us. Thank you!             Your Updated Medication List - Protect others around you: Learn how to safely use, store and throw away your medicines at www.disposemymeds.org.          This list is accurate as of 5/29/18 11:59 PM.  Always use your most recent med list.                   Brand Name Dispense Instructions for use Diagnosis    albuterol 108 (90 Base) MCG/ACT Inhaler    PROAIR HFA/PROVENTIL HFA/VENTOLIN HFA    1 Inhaler    Inhale 2 puffs into the lungs every 4 hours as needed for shortness of breath / dyspnea    Mild persistent asthma without complication       ASPIRIN PO      Take 81 mg by mouth        calcium carbonate 500 MG chewable tablet    TUMS     Take 2 chew tab by mouth 3 times daily        diclofenac 1 % Gel topical gel    VOLTAREN    100 g    Apply  2 grams to wrist area up to four times daily as needed using enclosed dosing card.    Radial styloid tenosynovitis, Right wrist pain       montelukast 10 MG tablet    SINGULAIR    90 tablet    Take 1 tablet (10 mg) by mouth At Bedtime    Mild persistent asthma without complication       NIFEdipine ER 30 MG Tb24    ADALAT CC     Take 2 tablets (60 mg) by mouth daily    Chronic hypertension       order for DME     1 each    Equipment being ordered: Wrist brace    Radial styloid tenosynovitis of right hand       PRENATAL VITAMIN PO

## 2018-05-29 NOTE — PATIENT INSTRUCTIONS
-Recheck BMP (lab) and blood pressure in 2 weeks, as discussed.  -Nurse visit OK for the 2 week blood pressure check, as discussed.  -6 week postpartum exam to be scheduled with OB/GYN.  -Follow immediately if emergent symptoms develop (e.g. headache, vision changes, swelling), as discussed.

## 2018-07-10 ENCOUNTER — PRENATAL OFFICE VISIT (OUTPATIENT)
Dept: OBGYN | Facility: CLINIC | Age: 33
End: 2018-07-10
Payer: COMMERCIAL

## 2018-07-10 VITALS
WEIGHT: 211.7 LBS | DIASTOLIC BLOOD PRESSURE: 82 MMHG | HEART RATE: 71 BPM | TEMPERATURE: 98.5 F | SYSTOLIC BLOOD PRESSURE: 115 MMHG | HEIGHT: 60 IN | BODY MASS INDEX: 41.56 KG/M2

## 2018-07-10 DIAGNOSIS — E66.01 MORBID OBESITY (H): ICD-10-CM

## 2018-07-10 DIAGNOSIS — Z12.4 SCREENING FOR MALIGNANT NEOPLASM OF CERVIX: ICD-10-CM

## 2018-07-10 PROCEDURE — G0145 SCR C/V CYTO,THINLAYER,RESCR: HCPCS | Performed by: OBSTETRICS & GYNECOLOGY

## 2018-07-10 PROCEDURE — 99207 ZZC POST PARTUM EXAM: CPT | Performed by: OBSTETRICS & GYNECOLOGY

## 2018-07-10 PROCEDURE — 87624 HPV HI-RISK TYP POOLED RSLT: CPT | Performed by: OBSTETRICS & GYNECOLOGY

## 2018-07-10 NOTE — LETTER
July 18, 2018    Ruby Morrison  [address] [facility] [address]    Dear Ruby,  We are happy to inform you that your PAP smear result from 07/10/18 is normal.  We are now able to do a follow up test on PAP smears. The DNA test is for HPV (Human Papilloma Virus). Cervical cancer is closely linked with certain types of HPV. Your results showed no evidence of high risk HPV.  Therefore we recommend you return in 3 years for your next pap smear.  You will still need to return to the clinic every year for an annual exam and other preventive tests.  Please contact the clinic at [phone] with any questions.  Sincerely,    Tomasa Bernal MD/[facility]

## 2018-07-10 NOTE — PROGRESS NOTES
Ruby is here for a 6-week postpartum checkup.    She had a  of a viable girl, weight 6 pounds 4 oz., with no complications.  Since delivery, she has been breast feeding.  She has no signs of infection, bleeding or other complications.  She is not pregnant.  We discussed contraception and she has chosen none.  Would be happy to conceive spontaneously again. Planning pregnancy in about 2 years.   Mood is good.  Today's Depression Rating was   PHQ-9 SCORE 7/10/2018   Total Score 0       EXAM:  Vitals:    07/10/18 1011   BP: 115/82   Pulse: 71   Temp: 98.5  F (36.9  C)   TempSrc: Oral   Weight: 211 lb 11.2 oz (96 kg)   Height: 5' (1.524 m)     HEENT: grossly normal.  NECK: no lymphadenopathy or thyroidomegaly.  LUNGS: CTA X 2, no rales or crackles.  BREASTS: symmetric, no masses or discharge  BACK: No spinal or CVA tenderness.  HEART: RRR without murmurs clicks or gallops.  ABDOMEN: soft, non tender, good bowel sounds, without masses rebound, guarding or tenderness.      PELVIC:    External genitalia: normal without lesion, repair well healed.                            Vagina: normal mucosa and rugae, no discharge.  Cervix: multiparous, well healed, without lesion.  Uterus: non pregnant in size, firm , mobile, no lesions.  Adnexa: non tender, without masses    EXTREMITIES: Warm to touch, good pulses, no ankle edema or calf tenderness.  NEUROLOGIC: grossly normal.    ASSESSMENT:   Normal 6-week postpartum exam after .    PLAN:  Pap smear Done and none for contraception.  Already under pre-pregnancy weight.

## 2018-07-10 NOTE — NURSING NOTE
Chief Complaint   Patient presents with     Postpartum Care       Initial /82  Pulse 71  Temp 98.5  F (36.9  C) (Oral)  Ht 5' (1.524 m)  Wt 211 lb 11.2 oz (96 kg)  Breastfeeding? Yes  BMI 41.34 kg/m2 Estimated body mass index is 41.34 kg/(m^2) as calculated from the following:    Height as of this encounter: 5' (1.524 m).    Weight as of this encounter: 211 lb 11.2 oz (96 kg).  BP completed using cuff size: large        The following HM Due: pap smear      The following patient reported/Care Every where data was sent to:  P ABSTRACT QUALITY INITIATIVES [[id number]]        patient has appointment for today    Lisa Kaur CMA

## 2018-07-10 NOTE — MR AVS SNAPSHOT
After Visit Summary   7/10/2018    Ruby Morrison    MRN: [id number]           Patient Information     Date Of Birth          1985        Visit Information        Provider Department      7/10/2018 9:45 AM Tomasa Bernal MD [facility]        Today's Diagnoses     Routine postpartum follow-up    -  1    Screening for malignant neoplasm of cervix        Morbid obesity (H)           Follow-ups after your visit        Who to contact     If you have questions or need follow up information about today's clinic visit or your schedule please contact [facility] directly at [phone].  Normal or non-critical lab and imaging results will be communicated to you by MyChart, letter or phone within 4 business days after the clinic has received the results. If you do not hear from us within 7 days, please contact the clinic through "[employer]t or phone. If you have a critical or abnormal lab result, we will notify you by phone as soon as possible.  Submit refill requests through [employer] or call your pharmacy and they will forward the refill request to us. Please allow 3 business days for your refill to be completed.          Additional Information About Your Visit        MyChart Information     [employer] gives you secure access to your electronic health record. If you see a primary care provider, you can also send messages to your care team and make appointments. If you have questions, please call your primary care clinic.  If you do not have a primary care provider, please call [phone] and they will assist you.        Care EveryWhere ID     This is your Care EveryWhere ID. This could be used by other organizations to access your [facility] medical records  [id number]        Your Vitals Were     Pulse Temperature Height Breastfeeding? BMI (Body Mass Index)       71 98.5  F (36.9  C) (Oral) 5' (1.524 m) Yes 41.34 kg/m2        Blood Pressure from Last 3 Encounters:    07/10/18 115/82   05/29/18 122/88   05/25/18 (!) 153/103    Weight from Last 3 Encounters:   07/10/18 211 lb 11.2 oz (96 kg)   05/29/18 221 lb (100.2 kg)   05/25/18 225 lb (102.1 kg)              We Performed the Following     HPV High Risk Types DNA Cervical     Pap imaged thin layer screen with HPV - recommended age 30 - 65 years (select HPV order below)          Today's Medication Changes          These changes are accurate as of 7/10/18 10:35 AM.  If you have any questions, ask your nurse or doctor.               Stop taking these medicines if you haven't already. Please contact your care team if you have questions.     ASPIRIN PO   Stopped by:  Tomasa Bernal MD           calcium carbonate 500 MG chewable tablet   Commonly known as:  TUMS   Stopped by:  Tomasa Bernal MD                    Primary Care Provider Office Phone # Fax #    Mesha JohnDO [phone]       [address] [place] [address]        Equal Access to Services     [facility]: Hadii ivy orellana hadasho Soomaali, waaxda luqadaha, qaybta kaalmada adeegyasp, tosin rollins . So [facility] [phone].    ATENCIÓN: Si habla español, tiene a mendez disposición servicios gratuitos de asistencia lingüística. Llame al [phone].    We comply with applicable federal civil rights laws and [place] laws. We do not discriminate on the basis of race, color, national origin, age, disability, sex, sexual orientation, or gender identity.            Thank you!     Thank you for choosing [facility]  for your care. Our goal is always to provide you with excellent care. Hearing back from our patients is one way we can continue to improve our services. Please take a few minutes to complete the written survey that you may receive in the mail after your visit with us. Thank you!             Your Updated Medication List - Protect others around you: Learn how to safely use, store and  throw away your medicines at www.disposemymeds.org.          This list is accurate as of 7/10/18 10:35 AM.  Always use your most recent med list.                   Brand Name Dispense Instructions for use Diagnosis    albuterol 108 (90 Base) MCG/ACT Inhaler    PROAIR HFA/PROVENTIL HFA/VENTOLIN HFA    1 Inhaler    Inhale 2 puffs into the lungs every 4 hours as needed for shortness of breath / dyspnea    Mild persistent asthma without complication       diclofenac 1 % Gel topical gel    VOLTAREN    100 g    Apply  2 grams to wrist area up to four times daily as needed using enclosed dosing card.    Radial styloid tenosynovitis, Right wrist pain       labetalol 200 MG tablet    NORMODYNE    60 tablet    Take 2 tablets (400 mg) by mouth 2 times daily    Chronic hypertension       montelukast 10 MG tablet    SINGULAIR    90 tablet    Take 1 tablet (10 mg) by mouth At Bedtime    Mild persistent asthma without complication       NIFEdipine ER 30 MG Tb24    ADALAT CC     Take 2 tablets (60 mg) by mouth daily    Chronic hypertension       order for DME     1 each    Equipment being ordered: Wrist brace    Radial styloid tenosynovitis of right hand       PRENATAL VITAMIN PO

## 2018-07-11 ASSESSMENT — PATIENT HEALTH QUESTIONNAIRE - PHQ9: SUM OF ALL RESPONSES TO PHQ QUESTIONS 1-9: 0

## 2018-07-12 LAB
COPATH REPORT: NORMAL
PAP: NORMAL

## 2018-07-13 LAB
FINAL DIAGNOSIS: NORMAL
HPV HR 12 DNA CVX QL NAA+PROBE: NEGATIVE
HPV16 DNA SPEC QL NAA+PROBE: NEGATIVE
HPV18 DNA SPEC QL NAA+PROBE: NEGATIVE
SPECIMEN DESCRIPTION: NORMAL
SPECIMEN SOURCE CVX/VAG CYTO: NORMAL

## 2018-09-28 ENCOUNTER — MYC MEDICAL ADVICE (OUTPATIENT)
Dept: FAMILY MEDICINE | Facility: CLINIC | Age: 33
End: 2018-09-28

## 2018-09-28 DIAGNOSIS — I10 CHRONIC HYPERTENSION: ICD-10-CM

## 2018-09-28 DIAGNOSIS — Z13.6 CARDIOVASCULAR SCREENING; LDL GOAL LESS THAN 130: Primary | ICD-10-CM

## 2018-09-28 DIAGNOSIS — Z00.00 ENCOUNTER FOR ROUTINE ADULT HEALTH EXAMINATION WITHOUT ABNORMAL FINDINGS: ICD-10-CM

## 2018-09-28 DIAGNOSIS — Z13.220 SCREENING, LIPID: ICD-10-CM

## 2018-10-01 NOTE — TELEPHONE ENCOUNTER
Routing to Dr. Brown, who last saw patient and ordered labs. See labs from 4/17/18. Do you want to order an labs? She has an appointment with you on 10/10/18    Deondre Hughes RN

## 2018-10-02 ENCOUNTER — TELEPHONE (OUTPATIENT)
Dept: FAMILY MEDICINE | Facility: CLINIC | Age: 33
End: 2018-10-02

## 2018-10-02 NOTE — TELEPHONE ENCOUNTER
Called and left a VM message that we need to reschedule her appointment on 10/10/18 with Dr Brown..    Sarai Quiñonez

## 2018-10-02 NOTE — TELEPHONE ENCOUNTER
Called and left a VM message that we need to reschedule her appointment on 10/10/18 with Dr Brown.    Sarai Quiñonez

## 2018-10-03 NOTE — TELEPHONE ENCOUNTER
Huddled with Dr Brown and she has decided not to go to the [employer] meeting.  She wants me to open her schedule    Sarai Quiñonez

## 2018-10-03 NOTE — TELEPHONE ENCOUNTER
Called patient and let her know that I do not need to reschedule her appointment.    Sarai Quiñonez

## 2018-10-10 ENCOUNTER — OFFICE VISIT (OUTPATIENT)
Dept: FAMILY MEDICINE | Facility: CLINIC | Age: 33
End: 2018-10-10
Payer: COMMERCIAL

## 2018-10-10 VITALS
BODY MASS INDEX: 40.05 KG/M2 | SYSTOLIC BLOOD PRESSURE: 124 MMHG | DIASTOLIC BLOOD PRESSURE: 74 MMHG | HEIGHT: 60 IN | WEIGHT: 204 LBS | TEMPERATURE: 98 F | HEART RATE: 72 BPM

## 2018-10-10 DIAGNOSIS — E66.01 MORBID OBESITY (H): ICD-10-CM

## 2018-10-10 DIAGNOSIS — I10 CHRONIC HYPERTENSION: ICD-10-CM

## 2018-10-10 DIAGNOSIS — J45.30 MILD PERSISTENT ASTHMA WITHOUT COMPLICATION: ICD-10-CM

## 2018-10-10 DIAGNOSIS — Z23 NEED FOR PROPHYLACTIC VACCINATION AND INOCULATION AGAINST INFLUENZA: ICD-10-CM

## 2018-10-10 DIAGNOSIS — Z13.220 SCREENING, LIPID: ICD-10-CM

## 2018-10-10 DIAGNOSIS — O13.9 GESTATIONAL HYPERTENSION, ANTEPARTUM: ICD-10-CM

## 2018-10-10 DIAGNOSIS — E28.2 PCOS (POLYCYSTIC OVARIAN SYNDROME): ICD-10-CM

## 2018-10-10 DIAGNOSIS — Z00.00 ENCOUNTER FOR ROUTINE ADULT HEALTH EXAMINATION WITHOUT ABNORMAL FINDINGS: Primary | ICD-10-CM

## 2018-10-10 DIAGNOSIS — E78.1 HIGH TRIGLYCERIDES: ICD-10-CM

## 2018-10-10 LAB
ANION GAP SERPL CALCULATED.3IONS-SCNC: 10 MMOL/L (ref 3–14)
BUN SERPL-MCNC: 9 MG/DL (ref 7–30)
CALCIUM SERPL-MCNC: 8.8 MG/DL (ref 8.5–10.1)
CHLORIDE SERPL-SCNC: 103 MMOL/L (ref 94–109)
CHOLEST SERPL-MCNC: 183 MG/DL
CO2 SERPL-SCNC: 25 MMOL/L (ref 20–32)
CREAT SERPL-MCNC: 0.68 MG/DL (ref 0.52–1.04)
GFR SERPL CREATININE-BSD FRML MDRD: >90 ML/MIN/1.7M2
GLUCOSE SERPL-MCNC: 88 MG/DL (ref 70–99)
HDLC SERPL-MCNC: 32 MG/DL
HGB BLD-MCNC: 15.3 G/DL (ref 11.7–15.7)
LDLC SERPL CALC-MCNC: 99 MG/DL
NONHDLC SERPL-MCNC: 151 MG/DL
POTASSIUM SERPL-SCNC: 4.2 MMOL/L (ref 3.4–5.3)
SODIUM SERPL-SCNC: 138 MMOL/L (ref 133–144)
TRIGL SERPL-MCNC: 260 MG/DL

## 2018-10-10 PROCEDURE — 90686 IIV4 VACC NO PRSV 0.5 ML IM: CPT | Performed by: FAMILY MEDICINE

## 2018-10-10 PROCEDURE — 99395 PREV VISIT EST AGE 18-39: CPT | Mod: 25 | Performed by: FAMILY MEDICINE

## 2018-10-10 PROCEDURE — 80061 LIPID PANEL: CPT | Performed by: INTERNAL MEDICINE

## 2018-10-10 PROCEDURE — 36415 COLL VENOUS BLD VENIPUNCTURE: CPT | Performed by: INTERNAL MEDICINE

## 2018-10-10 PROCEDURE — 85018 HEMOGLOBIN: CPT | Performed by: INTERNAL MEDICINE

## 2018-10-10 PROCEDURE — 80048 BASIC METABOLIC PNL TOTAL CA: CPT | Performed by: INTERNAL MEDICINE

## 2018-10-10 PROCEDURE — 90471 IMMUNIZATION ADMIN: CPT | Performed by: FAMILY MEDICINE

## 2018-10-10 RX ORDER — ALBUTEROL SULFATE 90 UG/1
2 AEROSOL, METERED RESPIRATORY (INHALATION) EVERY 4 HOURS PRN
Qty: 1 INHALER | Refills: 1 | Status: SHIPPED | OUTPATIENT
Start: 2018-10-10 | End: 2019-10-31

## 2018-10-10 RX ORDER — MONTELUKAST SODIUM 10 MG/1
1 TABLET ORAL AT BEDTIME
Qty: 90 TABLET | Refills: 1 | Status: SHIPPED | OUTPATIENT
Start: 2018-10-10 | End: 2019-06-12

## 2018-10-10 ASSESSMENT — ENCOUNTER SYMPTOMS
SHORTNESS OF BREATH: 0
DIARRHEA: 0
PALPITATIONS: 0
DIZZINESS: 0
CHILLS: 0
EYE PAIN: 0
NAUSEA: 0
MYALGIAS: 0
HEMATOCHEZIA: 0
ABDOMINAL PAIN: 0
HEADACHES: 0
CONSTIPATION: 0
JOINT SWELLING: 0
HEARTBURN: 0
FEVER: 0
FREQUENCY: 0
HEMATURIA: 0
ARTHRALGIAS: 0
DYSURIA: 0
COUGH: 0

## 2018-10-10 NOTE — MR AVS SNAPSHOT
After Visit Summary   10/10/2018    Ruby Morrison    MRN: [id number]           Patient Information     Date Of Birth          1985        Visit Information        Provider Department      10/10/2018 6:00 PM Lorenzo Brown DO [facility]        Today's Diagnoses     Need for prophylactic vaccination and inoculation against influenza    -  1    Mild persistent asthma without complication          Care Instructions    Fish oil 2000mg to 3000mg recommended.                      Controlling Cholesterol  What is cholesterol?   Cholesterol is a fatty substance. It has both good and bad effects on the body. Your body needs small amounts of cholesterol to make hormones and to build and maintain cells. However, when your body has too much cholesterol, deposits of fat called plaque form inside the walls of your blood vessels (arteries). The blood vessel walls thicken and the vessels become narrower. This is a condition called atherosclerosis. These changes make it harder for blood to flow through the blood vessels, increasing your risk of heart disease, heart attack, and stroke. The plaque can also easily break off and cause a blockage. When the artery is blocked, no blood can flow through it. This prevents the heart muscle from getting oxygen and can cause a heart attack. If a piece of plaque breaks off and flows to the brain, it can cause a stroke.   Most of the cholesterol in your blood is made by your liver from the fats, carbohydrates, and proteins you eat. You also get cholesterol by eating animal products such as meat, eggs, and high-fat dairy products such as whole milk, cream, and real butter.   It is important to find out what your cholesterol numbers are because lowering cholesterol levels that are too high lessens your risk for developing heart disease. It reduces the chance of a heart attack or death from heart disease, even if you already have heart disease.   How  "is cholesterol measured?   When you get your cholesterol checked, your healthcare provider will give you a number for your total cholesterol level. You can use the chart below to see if your total cholesterol is high.     Total Cholesterol Level (mg/dL)   ----------------------------------------   less than 200   good   200 to 239      borderline high   240 or above    high   ----------------------------------------      When your total cholesterol is measured and found to be high, your healthcare provider may also check the amount of LDL (low-density lipoprotein) and HDL (high-density lipoprotein) in your blood. LDL and HDL carry cholesterol through your blood. LDL carries a lot of cholesterol, leaves behind fatty deposits on your artery walls, and contributes to heart disease. HDL does the opposite. HDL cleans the artery walls and removes extra cholesterol from the body, thus lowering the risk of heart disease. LDL cholesterol is called bad cholesterol. (You can think of \"L\" for \"lousy\" cholesterol.) HDL cholesterol is called good cholesterol (think of \"H\" for \"healthy\" cholesterol). It is good to have low levels of LDL and high levels of HDL.   Because HDL cholesterol protects against heart disease, higher numbers are better. HDL levels of 60 mg/dL or more help to lower your risk for heart disease. A level equal to or less than 40 mg/dL is low and is considered a major risk factor because it increases your risk for developing heart disease   The level of LDL cholesterol that is healthy for you depends on your risk of heart disease and heart attack. In general, the higher your LDL level and the more risk factors you have for heart disease, the greater your chances of developing heart disease or having a heart attack. These are the recommended goals for LDL, according to risk level:   The goal is less than 160 mg/dL if your risk of heart disease is low.   The goal is less than 130 mg/dL if you have a moderate risk. "   The goal is less than 100 mg/dL if you have a high risk of heart disease or you already have heart disease or diabetes.   For many people with heart disease, especially if they also have diabetes, the goal is less than 70 mg/dL.   Lowering cholesterol, especially the LDL, is connected or linked with:   Slowing, stopping, or even reversing the buildup of plaque   Reducing the chances of heart attack by making plaques more stable and less likely to break off or rupture.   This means the chance of having a heart attack is much less.   In addition to high levels of total cholesterol and LDL, major risks for heart disease include:   diabetes   cigarette smoking   high blood pressure (140/90 mm Hg or higher or you are taking blood pressure medicine)   low HDL cholesterol (less than 40 mg/dL)   family history of early heart disease (father or brother diagnosed with heart disease before age 55, or mother or sister diagnosed before age 65)   age 45 or older for men and age 55 or older for women.   If you have diabetes, your risk of heart disease is high. If you do not have diabetes but you have 2 or more of the other risk factors in this list, your risk is moderate to high. Based on your personal and family history, your healthcare provider can help you calculate your risk level.   How can I control my cholesterol level?   High cholesterol may run in families. Know your family history and discuss it with your healthcare provider.   You can often control cholesterol levels by   eating right   exercising   not smoking   losing weight if you are overweight.   If you have a high risk for heart disease, your healthcare provider may prescribe cholesterol-lowering medicine as well as changes in lifestyle.   Eat right.  Follow these diet guidelines to help control your cholesterol:   Limit the cholesterol in your diet to less than 300 mg per day. If you have heart disease, limit cholesterol to less than 200 mg per day.   Be  "careful about the amounts and types of fat that you eat. Fats should contribute no more than 25 to 35% of your daily calories. Most of your dietary fat should be from polyunsaturated and monounsaturated fats, which are healthier than saturated fat and trans fats.   Saturated fat raises your blood cholesterol because it makes it hard for the body to clear the cholesterol away. Less than 7 to 10% of your calories should come from saturated fat. Saturated fat is found in different amounts in almost all foods. Butter, some oils, meat, and poultry fat contain a lot of saturated fat.   Trans fatty acids, often called trans fats, tend to raise your bad LDL cholesterol and lower your good HDL cholesterol. Trans fats naturally occur in some foods, mostly in meat and dairy products. But food makers can create trans fats when they are preparing food for grocery stores. This is usually done by adding hydrogen to fats. If the list of ingredients of a food product includes the words \"partially hydrogenated\" (usually referring to oils, such as soybean oil and others), the product is likely to contain trans fats. Try to eat as little trans fat as possible. As of January 2006, nutrition labels must list trans fats if the food contains them. Check the nutrition bar on the side of the package.   Polyunsaturated fats are found in fish and some vegetable oils. Monounsaturated fats are found in olive oil, canola oil, and avocados. Both types of these healthier fats are also found in many nuts and legumes.   Adjust the amount of calories you eat and exercise regularly, according to your healthcare provider's exercise prescription, to maintain your recommended body weight.   To control the cholesterol and types and amounts of fat you eat:   Check food labels for fat and cholesterol content. Choose the foods with less fat per serving.   Limit the amount of butter and margarine you eat.   Use olive, canola, sunflower, safflower, soybean, or " corn oil. Avoid tropical oils such as palm or coconut oil. Also avoid oils that have been hydrogenated or partially hydrogenated.   Use salad dressings and margarine made with polyunsaturated and monounsaturated fats.   Use egg whites or egg substitutes rather than whole eggs.   Replace whole-milk dairy products with nonfat or low-fat milk, cheese, spreads, and yogurt.   Eat skinless chicken, turkey, fish, and meatless entrees more often than red meat.   Choose lean cuts of meat and trim off all visible fat. Keep portion sizes moderate.   Avoid fatty desserts such as ice cream, cream-filled cakes, and cheesecakes. Choose fresh fruits, nonfat frozen yogurt, Popsicles, etc.   Reduce the amount of fried foods, vending machine food, and fast food you eat.   Eat several daily servings of fruits and vegetables (especially fresh fruits and leafy vegetables), beans, and whole grains (such as whole wheat, bran, brown rice, oats, and oatmeal). The fiber in these foods helps lower cholesterol.   Eat 4 to 5 servings of nuts a week. Examples of nuts that can be a part of a healthy diet are walnuts, almonds, hazelnuts, peanuts, pecans, and pistachio nuts.   Look for low-fat or nonfat varieties of the foods you like to eat, or look for substitutes.   Exercise.  Exercise goes hand-in-hand with a healthy diet for controlling cholesterol. Exercise helps because it:   Keeps your weight down.   Decreases your total cholesterol level.   Decreases your LDL (bad cholesterol).   Increases your HDL (good cholesterol).   A good exercise program includes aerobic exercise. Aerobic exercise is any activity that keeps your heart rate up (such as swimming, jogging, walking, and bicycling). You should get at least 30 minutes of moderate aerobic exercise most days of the week. Moderate aerobic exercise is generally defined as requiring the energy it takes to walk 2 miles in 30 minutes. You may need to exercise 60 minutes a day to prevent weight  "gain and 90 minutes a day to lose weight.   If you haven't been exercising, ask your healthcare provider for an exercise prescription and start your new exercise program slowly.   Don't smoke.  Do not smoke. Smoking increases your risk of heart disease because it lowers HDL levels, increases your risk of blood clots, and decreases oxygen to the tissues.   Lose excess weight.  Extra weight increases your risk for heart and blood vessel disease. One way it does this is by causing your LDL (\"bad\") cholesterol to go up. Extra weight can also make you tired. It takes a lot of energy to carry all those pounds around. The result is that you are less active. This can mean that you don't get enough exercise and gain even more weight.   Losing excess weight:   Improves not only the bad LDL cholesterol but other blood fats as well.   Lowers your risk for heart attack or stroke.   Increases your energy and helps you feel better (both physically and mentally) and become more active.   Your weight is primarily the result of 2 factors. One is the number of calories you consume. The other is the number of calories you \"burn\". If you eat more calories than you use, your body will store the extra calories as fat and your weight will go up. If your body uses more calories than you eat, you will lose weight.   Here are some things you can do to lose weight.   Talk to your healthcare provider about your weight. Ask how a change in diet and exercise will change your cholesterol levels. Plan for gradual weight loss, just 1 or 2 pounds per week   Eat fewer calories.   Get more physical activity.   Keep a diary of your food and exercise for a couple of weeks to become more aware of your habits.   In summary, changes that you can make in your lifestyle to control your cholesterol level are:   Eat healthy.   Get regular exercise.   Don't smoke.   Keep a healthy weight.   Have your cholesterol levels checked as often as your provider " recommends.   Cholesterol in the Diet  Cholesterol is a fatty substance in your body and in foods made from animals. There is a lot of it in meat, including beef, pork, chicken, and turkey. Whole-milk dairy products, egg yolks, and shellfish also have a lot of cholesterol.   Your body needs cholesterol to make hormones and build nerve cells. You don't have to get it from food because your body makes cholesterol. If you eat too many foods high in cholesterol or saturated fat you can get too much cholesterol. It can cause high levels of cholesterol in the blood. High cholesterol increases your risk for heart disease.   How are saturated fat and trans fats related to cholesterol levels?   Like cholesterol, saturated fats are found mostly in animal products. Limiting the saturated fat in your diet is just as important as limiting cholesterol because your body makes more cholesterol when you eat saturated fat. Trans fats are another type of fat in animal products. Trans fats are also in many processed foods, such as cakes, cookies and potato chips. Like saturated fat, trans fats raise cholesterol levels in the blood.   How much cholesterol do animal products have?   As the table below shows, some foods have more cholesterol and saturated fat than others. They may be high in both, or low in both, or high in one but not the other. The healthiest diets include mostly foods that are low in cholesterol, saturated, and trans fat.   Most foods in the meat group have about the same amount of cholesterol per serving, regardless of the type or cut of meat. However, the amount of saturated fat in these various meats can be very different. High-fat cuts, such as prime rib and dark-meat poultry with the skin, contain a lot more saturated fat than lean cuts, such as pork tenderloin and chicken breast without skin.   Whole-milk dairy products, such as whole milk, cheese, ice cream, sour cream, and butter, have a lot of cholesterol and  saturated fat. The good news is that food producers can remove both cholesterol and saturated fat from dairy foods. When dairy is skimmed of its fats, the cholesterol is skimmed off along with it. Skim (nonfat) dairy products are a healthy food choice.   Shellfish are low in saturated fat. Some shellfish are high in cholesterol, but the saturated fat is so low that these foods are still healthy. Fin fish, such as salmon, tuna, trout, and halibut, are relatively low in cholesterol and saturated fat.   Cholesterol and Saturated Fat Content of Selected Foods     Food                                 Fat             Cholesterol                                       (grams)         (milligrams)   --------------------------------------------------------------------   8 ounces (oz) whole milk             4.5 g               25 mg   8 ounces skim milk                   0.36 g               5 mg   1 tablespoon butter                  7 g                 30 mg   4 tablespoon sour cream              5.5 g               24 mg   3 oz. pork tenderloin                2 g                 65 mg   3 oz. pork sausage                   7.5 g               70 mg   3 oz sirloin steak                   3 g                 76 mg   3 oz beef ribs                       5 g                 69 mg   3 oz chicken breast without skin     1 g                 73 mg   3 oz chicken thigh with skin         3.7 g               79 mg   3 oz shrimp                          0.25               166 mg   3 oz salmon                          1.5                 50 mg   1/2 cup vegetable shortening        25.5 g                0 mg    ---------------------------------------------------------------------      How much cholesterol can I have in my diet?   The guidelines for cholesterol in the diet depend on your medical condition. The recommendations are:   less than 200 mg a day if you have high cholesterol or heart disease   less than 300 mg of cholesterol a  day if you do not have high cholesterol or heart disease.   Everyone should try to avoid saturated and trans fats.   Limiting cholesterol, saturated fat, and trans fat is easy if you get in the habit of cooking lean. Choose the leanest cuts of meats and dairy products, including more fish and less processed food. Some plant foods, such as palm oil, coconut oil, and cocoa butter do contain saturated fat, but it is not known if these fats have the same harmful effect on the heart as the saturated fat in animal products. Plant foods, such as grains, fruits, vegetables, vegetable oils, nuts, and seeds, do not contain any cholesterol.     Published by [employer].  This content is reviewed periodically and is subject to change as new health information becomes available. The information is intended to inform and educate and is not a replacement for medical evaluation, advice, diagnosis or treatment by a healthcare professional.   Patria Shahid RD, CDE   ? 2010 [facility] and/or its affiliates. All Rights Reserved.           Preventive Health Recommendations  Female Ages 26 - 39  Yearly exam:   See your health care provider every year in order to    Review health changes.     Discuss preventive care.      Review your medicines if you your doctor has prescribed any.    Until age 30: Get a Pap test every three years (more often if you have had an abnormal result).    After age 30: Talk to your doctor about whether you should have a Pap test every 3 years or have a Pap test with HPV screening every 5 years.   You do not need a Pap test if your uterus was removed (hysterectomy) and you have not had cancer.  You should be tested each year for STDs (sexually transmitted diseases), if you're at risk.   Talk to your provider about how often to have your cholesterol checked.  If you are at risk for diabetes, you should have a diabetes test (fasting glucose).  Shots: Get a flu shot each year. Get a tetanus shot every 10 years.    Nutrition:     Eat at least 5 servings of fruits and vegetables each day.    Eat whole-grain bread, whole-wheat pasta and brown rice instead of white grains and rice.    Get adequate Calcium and Vitamin D.     Lifestyle    Exercise at least 150 minutes a week (30 minutes a day, 5 days of the week). This will help you control your weight and prevent disease.    Limit alcohol to one drink per day.    No smoking.     Wear sunscreen to prevent skin cancer.    See your dentist every six months for an exam and cleaning.            Follow-ups after your visit        Who to contact     If you have questions or need follow up information about today's clinic visit or your schedule please contact [facility] directly at [phone].  Normal or non-critical lab and imaging results will be communicated to you by [employer], letter or phone within 4 business days after the clinic has received the results. If you do not hear from us within 7 days, please contact the clinic through [employer] or phone. If you have a critical or abnormal lab result, we will notify you by phone as soon as possible.  Submit refill requests through [employer] or call your pharmacy and they will forward the refill request to us. Please allow 3 business days for your refill to be completed.          Additional Information About Your Visit        [employer] Information     [employer] gives you secure access to your electronic health record. If you see a primary care provider, you can also send messages to your care team and make appointments. If you have questions, please call your primary care clinic.  If you do not have a primary care provider, please call [phone] and they will assist you.        Care EveryWhere ID     This is your Care EveryWhere ID. This could be used by other organizations to access your [place] medical records  [id number]        Your Vitals Were     Pulse Temperature Height BMI (Body Mass Index)          72 98  F  (36.7  C) (Oral) 5' (1.524 m) 39.84 kg/m2         Blood Pressure from Last 3 Encounters:   10/10/18 124/74   07/10/18 115/82   05/29/18 122/88    Weight from Last 3 Encounters:   10/10/18 204 lb (92.5 kg)   07/10/18 211 lb 11.2 oz (96 kg)   05/29/18 221 lb (100.2 kg)              We Performed the Following     FLU VACCINE, SPLIT VIRUS, IM (QUADRIVALENT) [44869]- >3 YRS     Vaccine Administration, Initial [09304]          Where to get your medicines      These medications were sent to [id number] - [place], [place] - [address] AT [facility] [address]  [address], [place] [address]     Phone:  [phone]     albuterol 108 (90 Base) MCG/ACT inhaler    montelukast 10 MG tablet          Primary Care Provider Office Phone # Fax #    Mesha JohnDO [phone] [address] [facility] [address]        Equal Access to Services     DEEPAK HWANG AH: Hadii ivy ku hadasho Soboali, waaxda luqadaha, qaybta kaalmada adeegyada, tosin rollins . So [facility] [phone].    ATENCIÓN: Si habla español, tiene a mendez disposición servicios gratuitos de asistencia lingüística. Llame al [phone].    We comply with applicable federal civil rights laws and [place] laws. We do not discriminate on the basis of race, color, national origin, age, disability, sex, sexual orientation, or gender identity.            Thank you!     Thank you for choosing [facility]  for your care. Our goal is always to provide you with excellent care. Hearing back from our patients is one way we can continue to improve our services. Please take a few minutes to complete the written survey that you may receive in the mail after your visit with us. Thank you!             Your Updated Medication List - Protect others around you: Learn how to safely use, store and throw away your medicines at www.disposemymeds.org.          This list is  accurate as of 10/10/18  6:30 PM.  Always use your most recent med list.                   Brand Name Dispense Instructions for use Diagnosis    albuterol 108 (90 Base) MCG/ACT inhaler    PROAIR HFA/PROVENTIL HFA/VENTOLIN HFA    1 Inhaler    Inhale 2 puffs into the lungs every 4 hours as needed for shortness of breath / dyspnea    Mild persistent asthma without complication       diclofenac 1 % Gel topical gel    VOLTAREN    100 g    Apply  2 grams to wrist area up to four times daily as needed using enclosed dosing card.    Radial styloid tenosynovitis, Right wrist pain       labetalol 200 MG tablet    NORMODYNE    60 tablet    Take 2 tablets (400 mg) by mouth 2 times daily    Chronic hypertension       montelukast 10 MG tablet    SINGULAIR    90 tablet    Take 1 tablet (10 mg) by mouth At Bedtime    Mild persistent asthma without complication       NIFEdipine ER 30 MG Tb24    ADALAT CC     Take 2 tablets (60 mg) by mouth daily    Chronic hypertension       order for DME     1 each    Equipment being ordered: Wrist brace    Radial styloid tenosynovitis of right hand       PRENATAL VITAMIN PO

## 2018-10-10 NOTE — PROGRESS NOTES
"   SUBJECTIVE:   CC: Ruby Morrison is an 33 year old woman who presents for preventive health visit.     Physical   Annual:     Getting at least 3 servings of Calcium per day:  Yes    Bi-annual eye exam:  Yes    Dental care twice a year:  Yes    Sleep apnea or symptoms of sleep apnea:  None    Diet:  Regular (no restrictions)    Frequency of exercise:  4-5 days/week    Duration of exercise:  45-60 minutes    Taking medications regularly:  Yes    Medication side effects:  Not applicable    Additional concerns today:  No    Gestational Hypertension   She reported that she has discontinued labetalol after one or two weeks of use for post partum hypertension.   She also stated she experienced borderline preeclampsia that resolved. No other complications. No gestational diabetes.   OF NOTE She has a 5 month year old.     Contraception and PCOS  Not using contraception. Reports that she is diagnosed with PCOS and is hard for her to get pregnant. She stated that she needed shots to get pregnant and is okay to get pregnant.         No abnormal vaginal discharge or STD concerns or abnormal lesion.     Cholesterol   Recent Labs   Lab Test  10/10/18   0722  04/17/18   1724   11/02/15   0809  10/20/14   0852   CHOL  183  203*   < >  171  179   HDL  32*  46*   < >  37*  38*   LDL  99  Cannot estimate LDL when triglyceride exceeds 400 mg/dL  120*   < >  102  116   TRIG  260*  461*   < >  161*  126   CHOLHDLRATIO   --    --    --   4.6  4.7    < > = values in this interval not displayed.     Has taken fish oil in the past, not currently taking.   She also stated that she is more active now compared to last year. Since with her pregnancy she is continuing to exercise three or fours days a week.   Denies family history  of heart attacks or strokes.     Migraines   Has \"no issues with migraines\". Occur infrequently. Takes Advil to resolve migraines if this does not work then she takes Excedrin Migraine to resolve migraines.   "       Asthma Follow-Up  Asthma is well controlled, but noted of flares when having cold like symptoms   Only taking albuterol and Singulair     Was ACT completed today?    Yes    ACT Total Scores 10/10/2018   ACT TOTAL SCORE -   ASTHMA ER VISITS -   ASTHMA HOSPITALIZATIONS -   ACT TOTAL SCORE (Goal Greater than or Equal to 20) 25   In the past 12 months, how many times did you visit the emergency room for your asthma without being admitted to the hospital? 0   In the past 12 months, how many times were you hospitalized overnight because of your asthma? 0       Recent asthma triggers that patient is dealing with: None        Today's PHQ-2 Score:   PHQ-2 ( 1999 [employer]) 10/10/2018   Q1: Little interest or pleasure in doing things 0   Q2: Feeling down, depressed or hopeless 0   PHQ-2 Score 0   Q1: Little interest or pleasure in doing things Not at all   Q2: Feeling down, depressed or hopeless Not at all   PHQ-2 Score 0       Abuse: Current or Past(Physical, Sexual or Emotional)- No  Do you feel safe in your environment - Yes    Social History   Substance Use Topics     Smoking status: Never Smoker     Smokeless tobacco: Never Used     Alcohol use 0.0 oz/week      Comment: Maybe once every 6 months     Alcohol Use 10/10/2018   If you drink alcohol do you typically have greater than 3 drinks per day OR greater than 7 drinks per week? No   No flowsheet data found.    Reviewed orders with patient.  Reviewed health maintenance and updated orders accordingly - Yes  Labs reviewed in EPIC  BP Readings from Last 3 Encounters:   10/10/18 124/74   07/10/18 115/82   05/29/18 122/88    Wt Readings from Last 3 Encounters:   10/10/18 204 lb (92.5 kg)   07/10/18 211 lb 11.2 oz (96 kg)   05/29/18 221 lb (100.2 kg)                  Patient Active Problem List   Diagnosis     Mild persistent asthma     Environmental allergies     GERD (gastroesophageal reflux disease)     CARDIOVASCULAR SCREENING; LDL GOAL LESS THAN 130     Obesity      Abnormal uterine bleeding     PCOS (polycystic ovarian syndrome)     Chronic hypertension     Morbid obesity (H)     Past Surgical History:   Procedure Laterality Date     HYMENOTOMY         Social History   Substance Use Topics     Smoking status: Never Smoker     Smokeless tobacco: Never Used     Alcohol use 0.0 oz/week      Comment: Maybe once every 6 months     Family History   Problem Relation Age of Onset     Migraines Mother      Hypertension Father      Lipids Father      Diabetes Maternal Grandmother      Diabetes Paternal Grandmother      Cancer Paternal Grandfather      luekemia         Current Outpatient Prescriptions   Medication Sig Dispense Refill     albuterol (PROAIR HFA/PROVENTIL HFA/VENTOLIN HFA) 108 (90 Base) MCG/ACT inhaler Inhale 2 puffs into the lungs every 4 hours as needed for shortness of breath / dyspnea 1 Inhaler 1     diclofenac (VOLTAREN) 1 % GEL topical gel Apply  2 grams to wrist area up to four times daily as needed using enclosed dosing card. 100 g 1     montelukast (SINGULAIR) 10 MG tablet Take 1 tablet (10 mg) by mouth At Bedtime 90 tablet 1     NIFEdipine ER (ADALAT CC) 30 MG TB24 Take 2 tablets (60 mg) by mouth daily       order for DME Equipment being ordered: Wrist brace 1 each 0     Prenatal Vit-Fe Fumarate-FA (PRENATAL VITAMIN PO)        labetalol (NORMODYNE) 200 MG tablet Take 2 tablets (400 mg) by mouth 2 times daily (Patient not taking: Reported on 7/10/2018) 60 tablet 1     No Known Allergies  Recent Labs   Lab Test  10/10/18   0722  05/22/18   1516  05/21/18   0924  05/11/18   0919  04/17/18   1724   10/20/17   0952   02/20/14   1455   10/25/11   1209   LDL  99   --    --    --   Cannot estimate LDL when triglyceride exceeds 400 mg/dL  120*   --   89   < >   --    < >  139*   HDL  32*   --    --    --   46*   --   39*   < >   --    < >   --    TRIG  260*   --    --    --   461*   --   208*   < >   --    < >   --    ALT   --   25  26  23   --    < >   --    < >   --     "< >   --    CR  0.68  0.57  0.52  0.56   --    < >  0.58   < >   --    < >   --    GFRESTIMATED  >90  >90  >90  >90   --    < >  >90   < >   --    < >   --    GFRESTBLACK  >90  >90  >90  >90   --    < >  >90   < >   --    < >   --    POTASSIUM  4.2   --   3.7   --    --    < >  3.9   < >   --    < >   --    TSH   --    --    --    --    --    --    --    --   1.85   --   1.47    < > = values in this interval not displayed.        Mammogram not appropriate for this patient based on age.    Pertinent mammograms are reviewed under the imaging tab.  History of abnormal Pap smear: NO - age 30- 65 PAP every 3 years recommended  PAP / HPV Latest Ref Rng & Units 7/10/2018 2015 5/10/2012   PAP - NIL NIL NIL   HPV 16 DNA NEG:Negative Negative - -   HPV 18 DNA NEG:Negative Negative - -   OTHER HR HPV NEG:Negative Negative - -     Reviewed and updated as needed this visit by clinical staff  Tobacco  Allergies  Meds  Med Hx  Surg Hx  Fam Hx  Soc Hx        Reviewed and updated as needed this visit by Provider        Past Medical History:   Diagnosis Date     Asthma      Cat allergies      Encounter for supervision of other normal pregnancy 2015     GERD (gastroesophageal reflux disease)      Group B Streptococcus urinary tract infection affecting pregnancy in first trimester 2017     Hx of previous reproductive problem      Hypertension     during pregnancy      Past Surgical History:   Procedure Laterality Date     HYMENOTOMY       Obstetric History       T2      L2     SAB0   TAB0   Ectopic0   Multiple0   Live Births2       # Outcome Date GA Lbr Serge/2nd Weight Sex Delivery Anes PTL Lv   2 Term 18 38w1d  6 lb 4.5 oz (2.85 kg) F Vag-Spont EPI N GARY      Name: Cata \"Aishwarya\"      Apgar1:  8                Apgar5: 9   1 Term 16 37w0d / 03:50 5 lb 10 oz (2.551 kg) F Vag-Spont EPI  GARY      Name: Chino \"Gardenia\"      Apgar1:  2                Apgar5: 6          Review of Systems "   Constitutional: Negative for chills and fever.   HENT: Negative for congestion, ear pain and hearing loss.    Eyes: Negative for pain.   Respiratory: Negative for cough and shortness of breath.    Cardiovascular: Negative for chest pain, palpitations and peripheral edema.   Gastrointestinal: Negative for abdominal pain, constipation, diarrhea, heartburn, hematochezia and nausea.   Breasts:  Negative for tenderness and discharge.   Genitourinary: Negative for dysuria, frequency, genital sores, hematuria, pelvic pain, urgency, vaginal bleeding and vaginal discharge.   Musculoskeletal: Negative for arthralgias, joint swelling and myalgias.   Skin: Negative for rash.   Neurological: Negative for dizziness and headaches.     This document serves as a record of the services and decisions personally performed and made by Lorenzo Brown D.O. It was created on her behalf by Severiano Moran, a trained medical scribe. The creation of this document is based on the provider's statements to the medical scribe.  Severiano Moran October 10, 2018 6:16 PM       OBJECTIVE:   /74  Pulse 72  Temp 98  F (36.7  C) (Oral)  Ht 5' (1.524 m)  Wt 204 lb (92.5 kg)  BMI 39.84 kg/m2  Physical Exam  GENERAL: alert, no distress and obese  EYES: Eyes grossly normal to inspection, PERRL and conjunctivae and sclerae normal  HENT: ear canals and TM's normal, nose and mouth without ulcers or lesions  NECK: no adenopathy, no asymmetry, masses, or scars and thyroid normal to palpation  RESP: lungs clear to auscultation - no rales, rhonchi or wheezes  BREAST: normal without masses, tenderness or nipple discharge and no palpable axillary masses or adenopathy  CV: regular rate and rhythm, normal S1 S2, no S3 or S4, no murmur, click or rub, no peripheral edema and peripheral pulses strong  ABDOMEN: soft, nontender, no hepatosplenomegaly, no masses and bowel sounds normal  MS: no gross musculoskeletal defects noted, no edema  SKIN: no suspicious  lesions or rashes and well healed scar on right shoulder and back from previous mole biopsy, small keloid on right chin.   NEURO: Normal strength and tone, mentation intact and speech normal  PSYCH: mentation appears normal, affect normal/bright    Diagnostic Test Results:  No results found for this or any previous visit (from the past 24 hour(s)).    ASSESSMENT/PLAN:     1. Encounter for routine adult health examination without abnormal findings  PAP due 2021, denies abnormal vaginal discharge or STD concerns or lesions.      2. Mild persistent asthma without complication  Well controlled and stable with current asthma medications, she will continue to take as directed.   - albuterol (PROAIR HFA/PROVENTIL HFA/VENTOLIN HFA) 108 (90 Base) MCG/ACT inhaler; Inhale 2 puffs into the lungs every 4 hours as needed for shortness of breath / dyspnea  Dispense: 1 Inhaler; Refill: 1  - montelukast (SINGULAIR) 10 MG tablet; Take 1 tablet (10 mg) by mouth At Bedtime  Dispense: 90 tablet; Refill: 1    3. Need for prophylactic vaccination and inoculation against influenza  - FLU VACCINE, SPLIT VIRUS, IM (QUADRIVALENT) [66879]- >3 YRS  - Vaccine Administration, Initial [12583]    4. High triglycerides  Advised patient to begin Fish oil 2000mg to 3000mg. Handout for controlling cholesterol levels given and reviewed with patient. Follow up here in 6 months for recheck fasting labs.        5. PCOS (polycystic ovarian syndrome)  Stable.      6. Gestational hypertension, antepartum  She reported that she has discontinued labetalol after one or two weeks of use for post partum hypertension.   She also stated she experienced borderline preeclampsia that resolved. No other complications. No gestational diabetes.   OF NOTE She has a 5 month year old.     7. Morbid obesity (H)  She stated that she is more active now compared to last year. Since with her pregnancy she is continuing to exercise three or fours days a week. She will continue  with healthy lifestyle choices as previous.      COUNSELING:  Reviewed preventive health counseling, as reflected in patient instructions       Regular exercise       Healthy diet/nutrition       Immunizations    Vaccinated for: flu shot             Contraception       Family planning    BP Readings from Last 1 Encounters:   10/10/18 124/74     Estimated body mass index is 39.84 kg/(m^2) as calculated from the following:    Height as of this encounter: 5' (1.524 m).    Weight as of this encounter: 204 lb (92.5 kg).      Weight management plan: Discussed healthy diet and exercise guidelines and patient will follow up in 12 months in clinic to re-evaluate.     reports that she has never smoked. She has never used smokeless tobacco.      Counseling Resources:  ATP IV Guidelines  Pooled Cohorts Equation Calculator  Breast Cancer Risk Calculator  FRAX Risk Assessment  ICSI Preventive Guidelines  Dietary Guidelines for Americans, 2010  USDA's MyPlate  ASA Prophylaxis  Lung CA Screening    The information in this document, created by the medical scribe for me, accurately reflects the services I personally performed and the decisions made by me. I have reviewed and approved this document for accuracy prior to leaving the patient care area.  October 10, 2018 6:35 PM      Lorenzo Brown DO  [facility]  Answers for HPI/ROS submitted by the patient on 10/10/2018   PHQ-2 Score: 0      Injectable Influenza Immunization Documentation    1.  Is the person to be vaccinated sick today?   No    2. Does the person to be vaccinated have an allergy to a component   of the vaccine?   No  Egg Allergy Algorithm Link    3. Has the person to be vaccinated ever had a serious reaction   to influenza vaccine in the past?   No    4. Has the person to be vaccinated ever had Guillain-Barré syndrome?   No    Form completed by patient

## 2018-10-10 NOTE — PATIENT INSTRUCTIONS
Fish oil 2000mg to 3000mg recommended.                      Controlling Cholesterol  What is cholesterol?   Cholesterol is a fatty substance. It has both good and bad effects on the body. Your body needs small amounts of cholesterol to make hormones and to build and maintain cells. However, when your body has too much cholesterol, deposits of fat called plaque form inside the walls of your blood vessels (arteries). The blood vessel walls thicken and the vessels become narrower. This is a condition called atherosclerosis. These changes make it harder for blood to flow through the blood vessels, increasing your risk of heart disease, heart attack, and stroke. The plaque can also easily break off and cause a blockage. When the artery is blocked, no blood can flow through it. This prevents the heart muscle from getting oxygen and can cause a heart attack. If a piece of plaque breaks off and flows to the brain, it can cause a stroke.   Most of the cholesterol in your blood is made by your liver from the fats, carbohydrates, and proteins you eat. You also get cholesterol by eating animal products such as meat, eggs, and high-fat dairy products such as whole milk, cream, and real butter.   It is important to find out what your cholesterol numbers are because lowering cholesterol levels that are too high lessens your risk for developing heart disease. It reduces the chance of a heart attack or death from heart disease, even if you already have heart disease.   How is cholesterol measured?   When you get your cholesterol checked, your healthcare provider will give you a number for your total cholesterol level. You can use the chart below to see if your total cholesterol is high.     Total Cholesterol Level (mg/dL)   ----------------------------------------   less than 200   good   200 to 239      borderline high   240 or above    high   ----------------------------------------      When your total cholesterol is measured and  "found to be high, your healthcare provider may also check the amount of LDL (low-density lipoprotein) and HDL (high-density lipoprotein) in your blood. LDL and HDL carry cholesterol through your blood. LDL carries a lot of cholesterol, leaves behind fatty deposits on your artery walls, and contributes to heart disease. HDL does the opposite. HDL cleans the artery walls and removes extra cholesterol from the body, thus lowering the risk of heart disease. LDL cholesterol is called bad cholesterol. (You can think of \"L\" for \"lousy\" cholesterol.) HDL cholesterol is called good cholesterol (think of \"H\" for \"healthy\" cholesterol). It is good to have low levels of LDL and high levels of HDL.   Because HDL cholesterol protects against heart disease, higher numbers are better. HDL levels of 60 mg/dL or more help to lower your risk for heart disease. A level equal to or less than 40 mg/dL is low and is considered a major risk factor because it increases your risk for developing heart disease   The level of LDL cholesterol that is healthy for you depends on your risk of heart disease and heart attack. In general, the higher your LDL level and the more risk factors you have for heart disease, the greater your chances of developing heart disease or having a heart attack. These are the recommended goals for LDL, according to risk level:   The goal is less than 160 mg/dL if your risk of heart disease is low.   The goal is less than 130 mg/dL if you have a moderate risk.   The goal is less than 100 mg/dL if you have a high risk of heart disease or you already have heart disease or diabetes.   For many people with heart disease, especially if they also have diabetes, the goal is less than 70 mg/dL.   Lowering cholesterol, especially the LDL, is connected or linked with:   Slowing, stopping, or even reversing the buildup of plaque   Reducing the chances of heart attack by making plaques more stable and less likely to break off or " rupture.   This means the chance of having a heart attack is much less.   In addition to high levels of total cholesterol and LDL, major risks for heart disease include:   diabetes   cigarette smoking   high blood pressure (140/90 mm Hg or higher or you are taking blood pressure medicine)   low HDL cholesterol (less than 40 mg/dL)   family history of early heart disease (father or brother diagnosed with heart disease before age 55, or mother or sister diagnosed before age 65)   age 45 or older for men and age 55 or older for women.   If you have diabetes, your risk of heart disease is high. If you do not have diabetes but you have 2 or more of the other risk factors in this list, your risk is moderate to high. Based on your personal and family history, your healthcare provider can help you calculate your risk level.   How can I control my cholesterol level?   High cholesterol may run in families. Know your family history and discuss it with your healthcare provider.   You can often control cholesterol levels by   eating right   exercising   not smoking   losing weight if you are overweight.   If you have a high risk for heart disease, your healthcare provider may prescribe cholesterol-lowering medicine as well as changes in lifestyle.   Eat right.  Follow these diet guidelines to help control your cholesterol:   Limit the cholesterol in your diet to less than 300 mg per day. If you have heart disease, limit cholesterol to less than 200 mg per day.   Be careful about the amounts and types of fat that you eat. Fats should contribute no more than 25 to 35% of your daily calories. Most of your dietary fat should be from polyunsaturated and monounsaturated fats, which are healthier than saturated fat and trans fats.   Saturated fat raises your blood cholesterol because it makes it hard for the body to clear the cholesterol away. Less than 7 to 10% of your calories should come from saturated fat. Saturated fat is found in  "different amounts in almost all foods. Butter, some oils, meat, and poultry fat contain a lot of saturated fat.   Trans fatty acids, often called trans fats, tend to raise your bad LDL cholesterol and lower your good HDL cholesterol. Trans fats naturally occur in some foods, mostly in meat and dairy products. But food makers can create trans fats when they are preparing food for grocery stores. This is usually done by adding hydrogen to fats. If the list of ingredients of a food product includes the words \"partially hydrogenated\" (usually referring to oils, such as soybean oil and others), the product is likely to contain trans fats. Try to eat as little trans fat as possible. As of January 2006, nutrition labels must list trans fats if the food contains them. Check the nutrition bar on the side of the package.   Polyunsaturated fats are found in fish and some vegetable oils. Monounsaturated fats are found in olive oil, canola oil, and avocados. Both types of these healthier fats are also found in many nuts and legumes.   Adjust the amount of calories you eat and exercise regularly, according to your healthcare provider's exercise prescription, to maintain your recommended body weight.   To control the cholesterol and types and amounts of fat you eat:   Check food labels for fat and cholesterol content. Choose the foods with less fat per serving.   Limit the amount of butter and margarine you eat.   Use olive, canola, sunflower, safflower, soybean, or corn oil. Avoid tropical oils such as palm or coconut oil. Also avoid oils that have been hydrogenated or partially hydrogenated.   Use salad dressings and margarine made with polyunsaturated and monounsaturated fats.   Use egg whites or egg substitutes rather than whole eggs.   Replace whole-milk dairy products with nonfat or low-fat milk, cheese, spreads, and yogurt.   Eat skinless chicken, turkey, fish, and meatless entrees more often than red meat.   Choose lean " cuts of meat and trim off all visible fat. Keep portion sizes moderate.   Avoid fatty desserts such as ice cream, cream-filled cakes, and cheesecakes. Choose fresh fruits, nonfat frozen yogurt, Popsicles, etc.   Reduce the amount of fried foods, vending machine food, and fast food you eat.   Eat several daily servings of fruits and vegetables (especially fresh fruits and leafy vegetables), beans, and whole grains (such as whole wheat, bran, brown rice, oats, and oatmeal). The fiber in these foods helps lower cholesterol.   Eat 4 to 5 servings of nuts a week. Examples of nuts that can be a part of a healthy diet are walnuts, almonds, hazelnuts, peanuts, pecans, and pistachio nuts.   Look for low-fat or nonfat varieties of the foods you like to eat, or look for substitutes.   Exercise.  Exercise goes hand-in-hand with a healthy diet for controlling cholesterol. Exercise helps because it:   Keeps your weight down.   Decreases your total cholesterol level.   Decreases your LDL (bad cholesterol).   Increases your HDL (good cholesterol).   A good exercise program includes aerobic exercise. Aerobic exercise is any activity that keeps your heart rate up (such as swimming, jogging, walking, and bicycling). You should get at least 30 minutes of moderate aerobic exercise most days of the week. Moderate aerobic exercise is generally defined as requiring the energy it takes to walk 2 miles in 30 minutes. You may need to exercise 60 minutes a day to prevent weight gain and 90 minutes a day to lose weight.   If you haven't been exercising, ask your healthcare provider for an exercise prescription and start your new exercise program slowly.   Don't smoke.  Do not smoke. Smoking increases your risk of heart disease because it lowers HDL levels, increases your risk of blood clots, and decreases oxygen to the tissues.   Lose excess weight.  Extra weight increases your risk for heart and blood vessel disease. One way it does this is by  "causing your LDL (\"bad\") cholesterol to go up. Extra weight can also make you tired. It takes a lot of energy to carry all those pounds around. The result is that you are less active. This can mean that you don't get enough exercise and gain even more weight.   Losing excess weight:   Improves not only the bad LDL cholesterol but other blood fats as well.   Lowers your risk for heart attack or stroke.   Increases your energy and helps you feel better (both physically and mentally) and become more active.   Your weight is primarily the result of 2 factors. One is the number of calories you consume. The other is the number of calories you \"burn\". If you eat more calories than you use, your body will store the extra calories as fat and your weight will go up. If your body uses more calories than you eat, you will lose weight.   Here are some things you can do to lose weight.   Talk to your healthcare provider about your weight. Ask how a change in diet and exercise will change your cholesterol levels. Plan for gradual weight loss, just 1 or 2 pounds per week   Eat fewer calories.   Get more physical activity.   Keep a diary of your food and exercise for a couple of weeks to become more aware of your habits.   In summary, changes that you can make in your lifestyle to control your cholesterol level are:   Eat healthy.   Get regular exercise.   Don't smoke.   Keep a healthy weight.   Have your cholesterol levels checked as often as your provider recommends.   Cholesterol in the Diet  Cholesterol is a fatty substance in your body and in foods made from animals. There is a lot of it in meat, including beef, pork, chicken, and turkey. Whole-milk dairy products, egg yolks, and shellfish also have a lot of cholesterol.   Your body needs cholesterol to make hormones and build nerve cells. You don't have to get it from food because your body makes cholesterol. If you eat too many foods high in cholesterol or saturated fat you " can get too much cholesterol. It can cause high levels of cholesterol in the blood. High cholesterol increases your risk for heart disease.   How are saturated fat and trans fats related to cholesterol levels?   Like cholesterol, saturated fats are found mostly in animal products. Limiting the saturated fat in your diet is just as important as limiting cholesterol because your body makes more cholesterol when you eat saturated fat. Trans fats are another type of fat in animal products. Trans fats are also in many processed foods, such as cakes, cookies and potato chips. Like saturated fat, trans fats raise cholesterol levels in the blood.   How much cholesterol do animal products have?   As the table below shows, some foods have more cholesterol and saturated fat than others. They may be high in both, or low in both, or high in one but not the other. The healthiest diets include mostly foods that are low in cholesterol, saturated, and trans fat.   Most foods in the meat group have about the same amount of cholesterol per serving, regardless of the type or cut of meat. However, the amount of saturated fat in these various meats can be very different. High-fat cuts, such as prime rib and dark-meat poultry with the skin, contain a lot more saturated fat than lean cuts, such as pork tenderloin and chicken breast without skin.   Whole-milk dairy products, such as whole milk, cheese, ice cream, sour cream, and butter, have a lot of cholesterol and saturated fat. The good news is that food producers can remove both cholesterol and saturated fat from dairy foods. When dairy is skimmed of its fats, the cholesterol is skimmed off along with it. Skim (nonfat) dairy products are a healthy food choice.   Shellfish are low in saturated fat. Some shellfish are high in cholesterol, but the saturated fat is so low that these foods are still healthy. Fin fish, such as salmon, tuna, trout, and halibut, are relatively low in  cholesterol and saturated fat.   Cholesterol and Saturated Fat Content of Selected Foods     Food                                 Fat             Cholesterol                                       (grams)         (milligrams)   --------------------------------------------------------------------   8 ounces (oz) whole milk             4.5 g               25 mg   8 ounces skim milk                   0.36 g               5 mg   1 tablespoon butter                  7 g                 30 mg   4 tablespoon sour cream              5.5 g               24 mg   3 oz. pork tenderloin                2 g                 65 mg   3 oz. pork sausage                   7.5 g               70 mg   3 oz sirloin steak                   3 g                 76 mg   3 oz beef ribs                       5 g                 69 mg   3 oz chicken breast without skin     1 g                 73 mg   3 oz chicken thigh with skin         3.7 g               79 mg   3 oz shrimp                          0.25               166 mg   3 oz salmon                          1.5                 50 mg   1/2 cup vegetable shortening        25.5 g                0 mg    ---------------------------------------------------------------------      How much cholesterol can I have in my diet?   The guidelines for cholesterol in the diet depend on your medical condition. The recommendations are:   less than 200 mg a day if you have high cholesterol or heart disease   less than 300 mg of cholesterol a day if you do not have high cholesterol or heart disease.   Everyone should try to avoid saturated and trans fats.   Limiting cholesterol, saturated fat, and trans fat is easy if you get in the habit of cooking lean. Choose the leanest cuts of meats and dairy products, including more fish and less processed food. Some plant foods, such as palm oil, coconut oil, and cocoa butter do contain saturated fat, but it is not known if these fats have the same harmful effect on  the heart as the saturated fat in animal products. Plant foods, such as grains, fruits, vegetables, vegetable oils, nuts, and seeds, do not contain any cholesterol.     Published by [employer].  This content is reviewed periodically and is subject to change as new health information becomes available. The information is intended to inform and educate and is not a replacement for medical evaluation, advice, diagnosis or treatment by a healthcare professional.   Patria Shhaid RD, CDE   ? 2010 [facility] and/or its affiliates. All Rights Reserved.           Preventive Health Recommendations  Female Ages 26 - 39  Yearly exam:   See your health care provider every year in order to    Review health changes.     Discuss preventive care.      Review your medicines if you your doctor has prescribed any.    Until age 30: Get a Pap test every three years (more often if you have had an abnormal result).    After age 30: Talk to your doctor about whether you should have a Pap test every 3 years or have a Pap test with HPV screening every 5 years.   You do not need a Pap test if your uterus was removed (hysterectomy) and you have not had cancer.  You should be tested each year for STDs (sexually transmitted diseases), if you're at risk.   Talk to your provider about how often to have your cholesterol checked.  If you are at risk for diabetes, you should have a diabetes test (fasting glucose).  Shots: Get a flu shot each year. Get a tetanus shot every 10 years.   Nutrition:     Eat at least 5 servings of fruits and vegetables each day.    Eat whole-grain bread, whole-wheat pasta and brown rice instead of white grains and rice.    Get adequate Calcium and Vitamin D.     Lifestyle    Exercise at least 150 minutes a week (30 minutes a day, 5 days of the week). This will help you control your weight and prevent disease.    Limit alcohol to one drink per day.    No smoking.     Wear sunscreen to prevent skin cancer.    See your  dentist every six months for an exam and cleaning.

## 2018-10-10 NOTE — LETTER
"December 10, 2018      Ruby Carmen Morrison  [address] [id number]        Dear Ruby,     Your cholesterol is abnormal, please use the recommendations below and recheck labs in 6-12 months.  Enclosed are your results.    Results for orders placed or performed in visit on 10/10/18   Basic metabolic panel   Result Value Ref Range    Sodium 138 133 - 144 mmol/L    Potassium 4.2 3.4 - 5.3 mmol/L    Chloride 103 94 - 109 mmol/L    Carbon Dioxide 25 20 - 32 mmol/L    Anion Gap 10 3 - 14 mmol/L    Glucose 88 70 - 99 mg/dL    Urea Nitrogen 9 7 - 30 mg/dL    Creatinine 0.68 0.52 - 1.04 mg/dL    GFR Estimate >90 >60 mL/min/1.7m2    GFR Estimate If Black >90 >60 mL/min/1.7m2    Calcium 8.8 8.5 - 10.1 mg/dL   Lipid panel reflex to direct LDL Fasting   Result Value Ref Range    Cholesterol 183 <200 mg/dL    Triglycerides 260 (H) <150 mg/dL    HDL Cholesterol 32 (L) >49 mg/dL    LDL Cholesterol Calculated 99 <100 mg/dL    Non HDL Cholesterol 151 (H) <130 mg/dL   Hemoglobin   Result Value Ref Range    Hemoglobin 15.3 11.7 - 15.7 g/dL     Ways to improve your cholesterol...    1- Eats less saturated fats (including avoiding \"trans\" fats).    2 - Eat more unsaturated fats  - found in vege tables, grains, and tree nuts.  Also by replacing butter with canola oil or olive oil.        3 - Eat more nuts. 1-2 ounces (a small handful) of almonds, walnuts, hazelnuts or pecans once a day in place of other less healthy snacks.    4 - Eat more high fiber foods - vegetables and whole grains including oat bran, oats, beans, peas, and flax seed.    5 - Eat more fish - such as salmon, tuna, mackerel, and sardines.  1 or 2 six ounce servings per week is a healthy replacement for other proteins.    6 - Exercise for at least 120 minutes per week - which is equal to 30 minutes 4 days per week.    Please call with any questions.      Sincerely,      Lorenzo Brown DO/kb               "

## 2018-10-11 DIAGNOSIS — J45.30 MILD PERSISTENT ASTHMA WITHOUT COMPLICATION: ICD-10-CM

## 2018-10-11 RX ORDER — ALBUTEROL SULFATE 90 UG/1
2 AEROSOL, METERED RESPIRATORY (INHALATION) EVERY 4 HOURS PRN
Qty: 1 INHALER | Refills: 1 | OUTPATIENT
Start: 2018-10-11

## 2018-10-11 ASSESSMENT — ASTHMA QUESTIONNAIRES: ACT_TOTALSCORE: 25

## 2018-10-11 NOTE — TELEPHONE ENCOUNTER
"Requested Prescriptions   Pending Prescriptions Disp Refills     albuterol (PROAIR HFA/PROVENTIL HFA/VENTOLIN HFA) 108 (90 Base) MCG/ACT inhaler 1 Inhaler 1    Last Written Prescription Date:  10/10/18  Last Fill Quantity: 1 inhaler,  # refills: 1   Last office visit: No previous visit found with prescribing provider:  10/10/18 Kevin   Future Office Visit:     Sig: Inhale 2 puffs into the lungs every 4 hours as needed for shortness of breath / dyspnea    Asthma Maintenance Inhalers - Anticholinergics Passed    10/11/2018  7:52 AM       Passed - Patient is age 12 years or older       Passed - Asthma control assessment score within normal limits in last 6 months    Please review ACT score.          Passed - Recent (6 mo) or future (30 days) visit within the authorizing provider's specialty    Patient had office visit in the last 6 months or has a visit in the next 30 days with authorizing provider or within the authorizing provider's specialty.  See \"Patient Info\" tab in inbasket, or \"Choose Columns\" in Meds & Orders section of the refill encounter.              "

## 2018-11-15 ENCOUNTER — MYC MEDICAL ADVICE (OUTPATIENT)
Dept: FAMILY MEDICINE | Facility: CLINIC | Age: 33
End: 2018-11-15

## 2019-05-21 ENCOUNTER — E-VISIT (OUTPATIENT)
Dept: OBGYN | Facility: CLINIC | Age: 34
End: 2019-05-21

## 2019-05-21 DIAGNOSIS — Z30.011 INITIATION OF ORAL CONTRACEPTION: Primary | ICD-10-CM

## 2019-05-21 PROCEDURE — 99444 ZZC PHYSICIAN ONLINE EVALUATION & MANAGEMENT SERVICE: CPT | Performed by: OBSTETRICS & GYNECOLOGY

## 2019-05-22 RX ORDER — NORETHINDRONE ACETATE AND ETHINYL ESTRADIOL .02; 1 MG/1; MG/1
1 TABLET ORAL DAILY
Qty: 84 TABLET | Refills: 3 | Status: SHIPPED | OUTPATIENT
Start: 2019-05-22 | End: 2019-10-31

## 2019-06-12 DIAGNOSIS — J45.30 MILD PERSISTENT ASTHMA WITHOUT COMPLICATION: ICD-10-CM

## 2019-06-12 NOTE — TELEPHONE ENCOUNTER
"Requested Prescriptions   Pending Prescriptions Disp Refills     montelukast (SINGULAIR) 10 MG tablet [Pharmacy Med Name: MONTELUKAST 10MG TABLETS] 90 tablet 0     Sig: TAKE 1 TABLET(10 MG) BY MOUTH AT BEDTIME       Leukotriene Inhibitors Protocol Failed - 6/12/2019 12:53 PM        Failed - Asthma control assessment score within normal limits in last 6 months     Please review ACT score.           Failed - Recent (6 mo) or future (30 days) visit within the authorizing provider's specialty     Patient had office visit in the last 6 months or has a visit in the next 30 days with authorizing provider or within the authorizing provider's specialty.  See \"Patient Info\" tab in inbasket, or \"Choose Columns\" in Meds & Orders section of the refill encounter.            Passed - Patient is age 12 or older     If patient is under 16, ok to refill using age based dosing.           Passed - Medication is active on med list        Last Written Prescription Date:  10/10/18  Last Fill Quantity: 90,  # refills: 1   Last office visit: 10/10/2018 with prescribing provider:     Future Office Visit:      "

## 2019-06-14 RX ORDER — MONTELUKAST SODIUM 10 MG/1
TABLET ORAL
Qty: 30 TABLET | Refills: 0 | Status: SHIPPED | OUTPATIENT
Start: 2019-06-14 | End: 2019-10-31

## 2019-06-14 NOTE — TELEPHONE ENCOUNTER
Per last visit note from provider on 10/10/18, asthma was stable, but patient recommended to return to clinic in 6 months for f/u and labs. Patent notified to schedule, and marilu given x1.    Antonieta King RN

## 2019-10-31 ENCOUNTER — OFFICE VISIT (OUTPATIENT)
Dept: FAMILY MEDICINE | Facility: CLINIC | Age: 34
End: 2019-10-31
Payer: COMMERCIAL

## 2019-10-31 VITALS
TEMPERATURE: 97.6 F | HEIGHT: 60 IN | DIASTOLIC BLOOD PRESSURE: 78 MMHG | WEIGHT: 197 LBS | BODY MASS INDEX: 38.68 KG/M2 | SYSTOLIC BLOOD PRESSURE: 128 MMHG

## 2019-10-31 DIAGNOSIS — Z13.220 SCREENING FOR HYPERLIPIDEMIA: ICD-10-CM

## 2019-10-31 DIAGNOSIS — Z30.41 ENCOUNTER FOR SURVEILLANCE OF CONTRACEPTIVE PILLS: ICD-10-CM

## 2019-10-31 DIAGNOSIS — Z13.1 SCREENING FOR DIABETES MELLITUS: ICD-10-CM

## 2019-10-31 DIAGNOSIS — J45.30 MILD PERSISTENT ASTHMA WITHOUT COMPLICATION: ICD-10-CM

## 2019-10-31 DIAGNOSIS — Z00.00 ROUTINE GENERAL MEDICAL EXAMINATION AT A HEALTH CARE FACILITY: Primary | ICD-10-CM

## 2019-10-31 PROCEDURE — 99395 PREV VISIT EST AGE 18-39: CPT | Mod: 25 | Performed by: NURSE PRACTITIONER

## 2019-10-31 PROCEDURE — 90686 IIV4 VACC NO PRSV 0.5 ML IM: CPT | Performed by: NURSE PRACTITIONER

## 2019-10-31 PROCEDURE — 90471 IMMUNIZATION ADMIN: CPT | Performed by: NURSE PRACTITIONER

## 2019-10-31 RX ORDER — ALBUTEROL SULFATE 90 UG/1
2 AEROSOL, METERED RESPIRATORY (INHALATION) EVERY 4 HOURS PRN
Qty: 1 INHALER | Refills: 1 | Status: SHIPPED | OUTPATIENT
Start: 2019-10-31 | End: 2021-01-18

## 2019-10-31 RX ORDER — NORETHINDRONE ACETATE AND ETHINYL ESTRADIOL .02; 1 MG/1; MG/1
1 TABLET ORAL DAILY
Qty: 84 TABLET | Refills: 3 | Status: SHIPPED | OUTPATIENT
Start: 2019-10-31 | End: 2020-05-14

## 2019-10-31 RX ORDER — MONTELUKAST SODIUM 10 MG/1
TABLET ORAL
Qty: 90 TABLET | Refills: 3 | Status: SHIPPED | OUTPATIENT
Start: 2019-10-31 | End: 2020-10-27

## 2019-10-31 ASSESSMENT — ENCOUNTER SYMPTOMS
CONSTIPATION: 0
CHILLS: 0
WEAKNESS: 0
MYALGIAS: 0
DIZZINESS: 0
ABDOMINAL PAIN: 0
EYE PAIN: 0
BREAST MASS: 0
ARTHRALGIAS: 0
DIARRHEA: 0
DYSURIA: 0
NERVOUS/ANXIOUS: 0
SORE THROAT: 0
FEVER: 0
SHORTNESS OF BREATH: 0
HEARTBURN: 0
PARESTHESIAS: 0
PALPITATIONS: 0
HEADACHES: 0
FREQUENCY: 0
NAUSEA: 0
HEMATURIA: 0
HEMATOCHEZIA: 0
COUGH: 0
JOINT SWELLING: 0

## 2019-10-31 ASSESSMENT — MIFFLIN-ST. JEOR: SCORE: 1525.09

## 2019-10-31 NOTE — PROGRESS NOTES
SUBJECTIVE:   CC: Ruby Morrison is an 34 year old woman who presents for preventive health visit.     Healthy Habits:     Getting at least 3 servings of Calcium per day:  Yes    Bi-annual eye exam:  Yes    Dental care twice a year:  Yes    Sleep apnea or symptoms of sleep apnea:  None    Diet:  Regular (no restrictions)    Frequency of exercise:  2-3 days/week    Duration of exercise:  30-45 minutes    Taking medications regularly:  Yes    Medication side effects:  None    PHQ-2 Total Score: 0    Additional concerns today:  No    biometric screening form. She will pick, this up once completed.  Not fasting today as she has had:  protein bar and protein shake   Flu shot today.    The patient does not smoke    No hormonal contraceptive risk factors:   Thrombophlebitis or thromboembolic disorder (DVT, PE, stroke); cerebrovascular or coronary artery disease; valvular heart disease with thrombogenic complications; ischemic heart disease; hypertension; migraine headaches; breast cancer or any other estrogen-dependent cancer; undiagnosed genital bleeding; acute or chronic liver disease; liver cancer; symptomatic gallbladder disease; vascular disease or diabetes of longer than 20 years duration; diabetes with complications of nephropathy/retinopathy/neuropathy; malabsorptive bariatric procedures; or lupus.    Asthma Follow-Up    Was ACT completed today?    Yes    ACT Total Scores 10/31/2019   ACT TOTAL SCORE -   ASTHMA ER VISITS -   ASTHMA HOSPITALIZATIONS -   ACT TOTAL SCORE (Goal Greater than or Equal to 20) -   In the past 12 months, how many times did you visit the emergency room for your asthma without being admitted to the hospital? 0   In the past 12 months, how many times were you hospitalized overnight because of your asthma? 0       How many days per week do you miss taking your asthma controller medication?  I do not have an asthma controller medication    Please describe any recent triggers for your  asthma: seasonal allergies     Have you had any Emergency Room Visits, Urgent Care Visits, or Hospital Admissions since your last office visit?  No     Has not used her inhaler in 6 months and allergy pill.    Today's PHQ-2 Score:   PHQ-2 ( 1999 [employer]) 10/31/2019   Q1: Little interest or pleasure in doing things 0   Q2: Feeling down, depressed or hopeless 0   PHQ-2 Score 0   Q1: Little interest or pleasure in doing things Not at all   Q2: Feeling down, depressed or hopeless Not at all   PHQ-2 Score 0       Abuse: Current or Past(Physical, Sexual or Emotional)- No  Do you feel safe in your environment? Yes        Social History     Tobacco Use     Smoking status: Never Smoker     Smokeless tobacco: Never Used   Substance Use Topics     Alcohol use: Yes     Alcohol/week: 0.0 standard drinks     Comment: Maybe once every 6 months         Alcohol Use 10/31/2019   Prescreen: >3 drinks/day or >7 drinks/week? No   Prescreen: >3 drinks/day or >7 drinks/week? -       Reviewed orders with patient.  Reviewed health maintenance and updated orders accordingly - Yes  BP Readings from Last 3 Encounters:   10/31/19 128/78   10/10/18 124/74   07/10/18 115/82    Wt Readings from Last 3 Encounters:   10/31/19 89.4 kg (197 lb)   10/10/18 92.5 kg (204 lb)   07/10/18 96 kg (211 lb 11.2 oz)                  Patient Active Problem List   Diagnosis     Mild persistent asthma     Environmental allergies     GERD (gastroesophageal reflux disease)     CARDIOVASCULAR SCREENING; LDL GOAL LESS THAN 130     Obesity     Abnormal uterine bleeding     PCOS (polycystic ovarian syndrome)     Chronic hypertension     Morbid obesity (H)     Encounter for surveillance of contraceptive pills     Past Surgical History:   Procedure Laterality Date     HYMENOTOMY         Social History     Tobacco Use     Smoking status: Never Smoker     Smokeless tobacco: Never Used   Substance Use Topics     Alcohol use: Yes     Alcohol/week: 0.0 standard drinks      Comment: Maybe once every 6 months     Family History   Problem Relation Age of Onset     Migraines Mother      Hypertension Father      Lipids Father      Diabetes Maternal Grandmother      Diabetes Paternal Grandmother      Cancer Paternal Grandfather         luekemia         Current Outpatient Medications   Medication Sig Dispense Refill     albuterol (PROAIR HFA/PROVENTIL HFA/VENTOLIN HFA) 108 (90 Base) MCG/ACT inhaler Inhale 2 puffs into the lungs every 4 hours as needed for shortness of breath / dyspnea 1 Inhaler 1     montelukast (SINGULAIR) 10 MG tablet TAKE 1 TABLET(10 MG) BY MOUTH AT BEDTIME 90 tablet 3     norethindrone-ethinyl estradiol (MICROGESTIN 1/20) 1-20 MG-MCG tablet Take 1 tablet by mouth daily 84 tablet 3     order for DME Equipment being ordered: Wrist brace (Patient not taking: Reported on 10/31/2019) 1 each 0     No Known Allergies    Mammogram not appropriate for this patient based on age.    Pertinent mammograms are reviewed under the imaging tab.  History of abnormal Pap smear: NO - age 30-65 PAP every 5 years with negative HPV co-testing recommended  PAP / HPV Latest Ref Rng & Units 7/10/2018 12/8/2015 5/10/2012   PAP - NIL NIL NIL   HPV 16 DNA NEG:Negative Negative - -   HPV 18 DNA NEG:Negative Negative - -   OTHER HR HPV NEG:Negative Negative - -     Reviewed and updated as needed this visit by clinical staff  Tobacco  Allergies  Meds  Problems  Med Hx  Surg Hx  Fam Hx  Soc Hx          Reviewed and updated as needed this visit by Provider  Tobacco  Allergies  Meds  Problems  Med Hx  Surg Hx  Fam Hx            Review of Systems   Constitutional: Negative for chills and fever.   HENT: Negative for congestion, ear pain, hearing loss and sore throat.    Eyes: Negative for pain and visual disturbance.   Respiratory: Negative for cough and shortness of breath.    Cardiovascular: Negative for chest pain, palpitations and peripheral edema.   Gastrointestinal: Negative for  abdominal pain, constipation, diarrhea, heartburn, hematochezia and nausea.   Breasts:  Negative for tenderness, breast mass and discharge.   Genitourinary: Negative for dysuria, frequency, genital sores, hematuria, pelvic pain, urgency, vaginal bleeding and vaginal discharge.   Musculoskeletal: Negative for arthralgias, joint swelling and myalgias.   Skin: Negative for rash.   Neurological: Negative for dizziness, weakness, headaches and paresthesias.   Psychiatric/Behavioral: Negative for mood changes. The patient is not nervous/anxious.         OBJECTIVE:   /78 (BP Location: Right arm, Patient Position: Sitting, Cuff Size: Adult Regular)   Temp 97.6  F (36.4  C) (Oral)   Ht 1.524 m (5')   Wt 89.4 kg (197 lb)   BMI 38.47 kg/m    Physical Exam  GENERAL: healthy, alert, no distress and obese  EYES: Eyes grossly normal to inspection, PERRL and conjunctivae and sclerae normal  HENT: ear canals and TM's normal, nose and mouth without ulcers or lesions  NECK: no adenopathy, no asymmetry, masses, or scars and thyroid normal to palpation  RESP: lungs clear to auscultation - no rales, rhonchi or wheezes  BREAST: normal without masses, tenderness or nipple discharge and no palpable axillary masses or adenopathy  CV: regular rate and rhythm, normal S1 S2, no S3 or S4, no murmur, click or rub, no peripheral edema and peripheral pulses strong  ABDOMEN: soft, nontender, no hepatosplenomegaly, no masses and bowel sounds normal   (female): deferred  MS: no gross musculoskeletal defects noted, no edema  SKIN: no suspicious lesions or rashes  NEURO: Normal strength and tone, mentation intact and speech normal  PSYCH: mentation appears normal, affect normal/bright      ASSESSMENT/PLAN:   1. Routine general medical examination at a health care facility    2. Mild persistent asthma without complication  Chronic, stable, continue current treatment.  - albuterol (PROAIR HFA/PROVENTIL HFA/VENTOLIN HFA) 108 (90 Base) MCG/ACT  inhaler; Inhale 2 puffs into the lungs every 4 hours as needed for shortness of breath / dyspnea  Dispense: 1 Inhaler; Refill: 1  - montelukast (SINGULAIR) 10 MG tablet; TAKE 1 TABLET(10 MG) BY MOUTH AT BEDTIME  Dispense: 90 tablet; Refill: 3    3. Encounter for surveillance of contraceptive pills    - norethindrone-ethinyl estradiol (MICROGESTIN 1/20) 1-20 MG-MCG tablet; Take 1 tablet by mouth daily  Dispense: 84 tablet; Refill: 3    4. Screening for hyperlipidemia    - Lipid panel reflex to direct LDL Fasting; Future    5. Screening for diabetes mellitus    - **Glucose FUTURE anytime; Future    COUNSELING:  Reviewed preventive health counseling, as reflected in patient instructions       Regular exercise       Healthy diet/nutrition    Estimated body mass index is 38.47 kg/m  as calculated from the following:    Height as of this encounter: 1.524 m (5').    Weight as of this encounter: 89.4 kg (197 lb).    Weight management plan: Discussed healthy diet and exercise guidelines     reports that she has never smoked. She has never used smokeless tobacco.      Counseling Resources:  ATP IV Guidelines  Pooled Cohorts Equation Calculator  Breast Cancer Risk Calculator  FRAX Risk Assessment  ICSI Preventive Guidelines  Dietary Guidelines for Americans, 2010  [employer]'s MyPlate  ASA Prophylaxis  Lung CA Screening    Jenn Lackey NP  [facility]

## 2019-10-31 NOTE — PATIENT INSTRUCTIONS
Preventive Health Recommendations  Female Ages 26 - 39  Yearly exam:   See your health care provider every year in order to    Review health changes.     Discuss preventive care.      Review your medicines if you your doctor has prescribed any.    Until age 30: Get a Pap test every three years (more often if you have had an abnormal result).    After age 30: Talk to your doctor about whether you should have a Pap test every 3 years or have a Pap test with HPV screening every 5 years.   You do not need a Pap test if your uterus was removed (hysterectomy) and you have not had cancer.  You should be tested each year for STDs (sexually transmitted diseases), if you're at risk.   Talk to your provider about how often to have your cholesterol checked.  If you are at risk for diabetes, you should have a diabetes test (fasting glucose).  Shots: Get a flu shot each year. Get a tetanus shot every 10 years.   Nutrition:     Eat at least 5 servings of fruits and vegetables each day.    Eat whole-grain bread, whole-wheat pasta and brown rice instead of white grains and rice.    Get adequate Calcium and Vitamin D.     Lifestyle    Exercise at least 150 minutes a week (30 minutes a day, 5 days of the week). This will help you control your weight and prevent disease.    Limit alcohol to one drink per day.    No smoking.     Wear sunscreen to prevent skin cancer.    See your dentist every six months for an exam and cleaning.    [facility]     Discharged by : Nirmala Glass CMA     If you have any questions regarding your visit please contact your care team:     Team Pamela              Clinic Hours Telephone Number     Dr. Calixto Lackey, ROD   7am-7pm  Monday - Thursday   7am-5pm  Fridays  [phone]   (Appointment scheduling available 24/7)     RN Line  [phone] option 2     [facility] - [facility] - 11am-9pm  Monday-Friday Saturday-Sunday- 9am-5pm     [place] -   5pm-9pm Monday-Friday Saturday-Sunday- 9am-5pm    [phone] - [place]    [phone] - [place]     For a Price Quote for your services, please call our Consumer Price Line at [phone].     What options do I have for visits at the clinic other than the traditional office visit?     To expand how we care for you, many of our providers are utilizing electronic visits (e-visits) and telephone visits, when medically appropriate, for interactions with their patients rather than a visit in the clinic. We also offer nurse visits for many medical concerns. Just like any other service, we will bill your insurance company for this type of visit based on time spent on the phone with your provider. Not all insurance companies cover these visits. Please check with your medical insurance if this type of visit is covered. You will be responsible for any charges that are not paid by your insurance.     E-visits via [employer]: generally incur a $45.00 fee.     Telephone visits:  Time spent on the phone: *charged based on time that is spent on the phone in increments of 10 minutes. Estimated cost:   5-10 mins $30.00   11-20 mins. $59.00   21-30 mins. $85.00       Use [employer] (secure email communication and access to your chart) to send your primary care provider a message or make an appointment. Ask someone on your Team how to sign up for [employer].     As always, Thank you for trusting us with your health care needs!      [facility]:    Scheduling Appointments  [facility]  Call: [phone]    [facility]  Call: [phone]    [facility]  Call: [phone]    For Gastroenterology referrals   [facility]   Clinics and Surgery [place], 4th Floor   [address] [place] [id number]   Appointments: [phone]    WHERE TO GO FOR  CARE?    Clinic    Make an appointment if you:       Are sick (cold, cough, flu, sore throat, earache or in pain).       Have a small injury (sprain, small cut, burn or broken bone).       Need a physical exam, Pap smear, vaccine or prescription refill.       Have questions about your health or medicines.    To reach us:      Call [phone] ([phone]). Open 24 hours every day. (For counseling services, call [phone].)    Log into [employer] at [employer]. (Visit [employer] to create an account.) Hospital emergency room    An emergency is a serious or life- threatening problem that must be treated right away.    Call 241 or get to the hospital if you have:      Very bad or sudden:            - Chest pain or pressure         - Bleeding         - Head or belly pain         - Dizziness or trouble seeing, walking or                          Speaking      Problems breathing      Blood in your vomit or you are coughing up blood      A major injury (knocked out, loss of a finger or limb, rape, broken bone protruding from skin)    A mental health crisis. (Or call the Mental Health Crisis line at 1-836.982.3993 or Suicide Prevention Hotline at 1-659.825.5814.)    Open 24 hours every day. You don't need an appointment.     Urgent care    Visit urgent care for sickness or small injuries when the clinic is closed. You don't need an appointment. To check hours or find an urgent care near you, visit [email or web address]. Online care    Get online care from [facility] for more than 70 common problems, like colds, allergies and infections. Open 24 hours every day at:   [email or web address]   Need help deciding?    For advice about where to be seen, you may call your clinic and ask to speak with a nurse. We're here for you 24 hours every day.         If you are deaf or hard of hearing, please let us know. We provide many free services including sign language interpreters, oral interpreters, TTYs, telephone  amplifiers, note takers and written materials.

## 2019-11-04 DIAGNOSIS — Z13.220 SCREENING FOR HYPERLIPIDEMIA: ICD-10-CM

## 2019-11-04 DIAGNOSIS — Z13.1 SCREENING FOR DIABETES MELLITUS: ICD-10-CM

## 2019-11-04 PROCEDURE — 36415 COLL VENOUS BLD VENIPUNCTURE: CPT | Performed by: NURSE PRACTITIONER

## 2019-11-04 PROCEDURE — 82947 ASSAY GLUCOSE BLOOD QUANT: CPT | Performed by: NURSE PRACTITIONER

## 2019-11-04 PROCEDURE — 80061 LIPID PANEL: CPT | Performed by: NURSE PRACTITIONER

## 2019-11-05 ENCOUNTER — MYC MEDICAL ADVICE (OUTPATIENT)
Dept: FAMILY MEDICINE | Facility: CLINIC | Age: 34
End: 2019-11-05

## 2019-11-05 PROBLEM — E78.2 MIXED HYPERLIPIDEMIA: Status: ACTIVE | Noted: 2019-11-05

## 2019-11-05 LAB
CHOLEST SERPL-MCNC: 230 MG/DL
GLUCOSE SERPL-MCNC: 86 MG/DL (ref 70–99)
HDLC SERPL-MCNC: 38 MG/DL
LDLC SERPL CALC-MCNC: 170 MG/DL
NONHDLC SERPL-MCNC: 192 MG/DL
TRIGL SERPL-MCNC: 109 MG/DL

## 2019-11-06 NOTE — TELEPHONE ENCOUNTER
Patient saw Jenn Lackey on 10/31. Routing to namita to contact patient when form is complete.    Thanks!  Deondre Fletcher

## 2019-11-06 NOTE — TELEPHONE ENCOUNTER
Biometric form completed and placed at . Patient notified.    Thank you,  Ivis VERA  [facility]  Team Pamela Coordinator

## 2019-11-26 ENCOUNTER — TELEPHONE (OUTPATIENT)
Dept: FAMILY MEDICINE | Facility: CLINIC | Age: 34
End: 2019-11-26

## 2019-11-26 NOTE — TELEPHONE ENCOUNTER
Panel Management Review      Patient has the following on her problem list:     Asthma review     ACT Total Scores 10/31/2019   ACT TOTAL SCORE -   ASTHMA ER VISITS -   ASTHMA HOSPITALIZATIONS -   ACT TOTAL SCORE (Goal Greater than or Equal to 20) -   In the past 12 months, how many times did you visit the emergency room for your asthma without being admitted to the hospital? 0   In the past 12 months, how many times were you hospitalized overnight because of your asthma? 0      1. Is Asthma diagnosis on the Problem List? Yes    2. Is Asthma listed on Health Maintenance? Yes    3. Patient is due for:  ACT and AAP      Composite cancer screening  Chart review shows that this patient is due/due soon for the following None  Summary:    Patient is due/failing the following:   AAP and ACT    Action needed:   ACT    Type of outreach:    Sent [employer] message.    Questions for provider review:    None                                                                                                                                    Cammy Rollins,        Chart routed to Care Team .

## 2019-12-03 ENCOUNTER — OFFICE VISIT (OUTPATIENT)
Dept: OBGYN | Facility: CLINIC | Age: 34
End: 2019-12-03
Payer: COMMERCIAL

## 2019-12-03 VITALS
SYSTOLIC BLOOD PRESSURE: 140 MMHG | HEIGHT: 60 IN | DIASTOLIC BLOOD PRESSURE: 93 MMHG | OXYGEN SATURATION: 98 % | WEIGHT: 192 LBS | HEART RATE: 85 BPM | BODY MASS INDEX: 37.69 KG/M2

## 2019-12-03 DIAGNOSIS — Z01.419 ENCOUNTER FOR GYNECOLOGICAL EXAMINATION WITHOUT ABNORMAL FINDING: Primary | ICD-10-CM

## 2019-12-03 DIAGNOSIS — E28.2 PCOS (POLYCYSTIC OVARIAN SYNDROME): ICD-10-CM

## 2019-12-03 PROCEDURE — 99212 OFFICE O/P EST SF 10 MIN: CPT | Performed by: OBSTETRICS & GYNECOLOGY

## 2019-12-03 ASSESSMENT — ASTHMA QUESTIONNAIRES
QUESTION_3 LAST FOUR WEEKS HOW OFTEN DID YOUR ASTHMA SYMPTOMS (WHEEZING, COUGHING, SHORTNESS OF BREATH, CHEST TIGHTNESS OR PAIN) WAKE YOU UP AT NIGHT OR EARLIER THAN USUAL IN THE MORNING: NOT AT ALL
QUESTION_5 LAST FOUR WEEKS HOW WOULD YOU RATE YOUR ASTHMA CONTROL: WELL CONTROLLED
QUESTION_2 LAST FOUR WEEKS HOW OFTEN HAVE YOU HAD SHORTNESS OF BREATH: ONCE OR TWICE A WEEK
ACT_TOTALSCORE: 21
QUESTION_1 LAST FOUR WEEKS HOW MUCH OF THE TIME DID YOUR ASTHMA KEEP YOU FROM GETTING AS MUCH DONE AT WORK, SCHOOL OR AT HOME: NONE OF THE TIME
QUESTION_4 LAST FOUR WEEKS HOW OFTEN HAVE YOU USED YOUR RESCUE INHALER OR NEBULIZER MEDICATION (SUCH AS ALBUTEROL): TWO OR THREE TIMES PER WEEK

## 2019-12-03 ASSESSMENT — PATIENT HEALTH QUESTIONNAIRE - PHQ9
SUM OF ALL RESPONSES TO PHQ QUESTIONS 1-9: 0
5. POOR APPETITE OR OVEREATING: NOT AT ALL

## 2019-12-03 ASSESSMENT — ANXIETY QUESTIONNAIRES
7. FEELING AFRAID AS IF SOMETHING AWFUL MIGHT HAPPEN: NOT AT ALL
5. BEING SO RESTLESS THAT IT IS HARD TO SIT STILL: NOT AT ALL
IF YOU CHECKED OFF ANY PROBLEMS ON THIS QUESTIONNAIRE, HOW DIFFICULT HAVE THESE PROBLEMS MADE IT FOR YOU TO DO YOUR WORK, TAKE CARE OF THINGS AT HOME, OR GET ALONG WITH OTHER PEOPLE: NOT DIFFICULT AT ALL
GAD7 TOTAL SCORE: 0
3. WORRYING TOO MUCH ABOUT DIFFERENT THINGS: NOT AT ALL
6. BECOMING EASILY ANNOYED OR IRRITABLE: NOT AT ALL
2. NOT BEING ABLE TO STOP OR CONTROL WORRYING: NOT AT ALL
1. FEELING NERVOUS, ANXIOUS, OR ON EDGE: NOT AT ALL

## 2019-12-03 ASSESSMENT — MIFFLIN-ST. JEOR: SCORE: 1502.41

## 2019-12-03 NOTE — LETTER
My Asthma Action Plan    Name: Ruby Morrison   YOB: 1985  Date: 12/7/2019   My doctor: Tomasa Bernal MD   My clinic: [facility]        My Control Medicine: Montelukast (Singulair) -  10 mg daily  My Rescue Medicine: Albuterol (Proair/Ventolin/Proventil HFA) 2-4 puffs EVERY 4 HOURS as needed. Use a spacer if recommended by your provider.  My Oral Steroid Medicine: none My Asthma Severity:   Mild Persistent  Know your asthma triggers: pollens  None            GREEN ZONE   Good Control    I feel good    No cough or wheeze    Can work, sleep and play without asthma symptoms       Take your asthma control medicine every day.     1. If exercise triggers your asthma, take your rescue medication    15 minutes before exercise or sports, and    During exercise if you have asthma symptoms  2. Spacer to use with inhaler: If you have a spacer, make sure to use it with your inhaler             YELLOW ZONE Getting Worse  I have ANY of these:    I do not feel good    Cough or wheeze    Chest feels tight    Wake up at night   1. Keep taking your Green Zone medications  2. Start taking your rescue medicine:    every 20 minutes for up to 1 hour. Then every 4 hours for 24-48 hours.  3. If you stay in the Yellow Zone for more than 12-24 hours, contact your doctor.  4. If you do not return to the Green Zone in 12-24 hours or you get worse, start taking your oral steroid medicine if prescribed by your provider.           RED ZONE Medical Alert - Get Help  I have ANY of these:    I feel awful    Medicine is not helping    Breathing getting harder    Trouble walking or talking    Nose opens wide to breathe       1. Take your rescue medicine NOW  2. If your provider has prescribed an oral steroid medicine, start taking it NOW  3. Call your doctor NOW  4. If you are still in the Red Zone after 20 minutes and you have not reached your doctor:    Take your rescue medicine again and    Call 911 or go  to the emergency room right away    See your regular doctor within 2 weeks of an Emergency Room or Urgent Care visit for follow-up treatment.          Annual Reminders:  Meet with Asthma Educator,  Flu Shot in the Fall, consider Pneumonia Vaccination for patients with asthma (aged 19 and older).    Pharmacy: [id number] - [place] [id number] [place] [address]    Electronically signed by Tomasa Bernal MD   Date: 12/07/19                      Asthma Triggers  How To Control Things That Make Your Asthma Worse    Triggers are things that make your asthma worse.  Look at the list below to help you find your triggers and what you can do about them.  You can help prevent asthma flare-ups by staying away from your triggers.      Trigger                                                          What you can do   Cigarette Smoke  Tobacco smoke can make asthma worse. Do not allow smoking in your home, car or around you.  Be sure no one smokes at a child s day care or school.  If you smoke, ask your health care provider for ways to help you quit.  Ask family members to quit too.  Ask your health care provider for a referral to Quit Plan to help you quit smoking, or call 2-915-642-PLAN.     Colds, Flu, Bronchitis  These are common triggers of asthma. Wash your hands often.  Don t touch your eyes, nose or mouth.  Get a flu shot every year.     Dust Mites  These are tiny bugs that live in cloth or carpet. They are too small to see. Wash sheets and blankets in hot water every week.   Encase pillows and mattress in dust mite proof covers.  Avoid having carpet if you can. If you have carpet, vacuum weekly.   Use a dust mask and HEPA vacuum.   Pollen and Outdoor Mold  Some people are allergic to trees, grass, or weed pollen, or molds. Try to keep your windows closed.  Limit time out doors when pollen count is high.   Ask you health care provider about taking medicine during allergy  season.     Animal Dander  Some people are allergic to skin flakes, urine or saliva from pets with fur or feathers. Keep pets with fur or feathers out of your home.    If you can t keep the pet outdoors, then keep the pet out of your bedroom.  Keep the bedroom door closed.  Keep pets off cloth furniture and away from stuffed toys.     Mice, Rats, and Cockroaches   Some people are allergic to the waste from these pests.   Cover food and garbage.  Clean up spills and food crumbs.  Store grease in the refrigerator.   Keep food out of the bedroom.   Indoor Mold  This can be a trigger if your home has high moisture. Fix leaking faucets, pipes, or other sources of water.   Clean moldy surfaces.  Dehumidify basement if it is damp and smelly.   Smoke, Strong Odors, and Sprays  These can reduce air quality. Stay away from strong odors and sprays, such as perfume, powder, hair spray, paints, smoke incense, paint, cleaning products, candles and new carpet.   Exercise or Sports  Some people with asthma have this trigger. Be active!  Ask your doctor about taking medicine before sports or exercise to prevent symptoms.    Warm up for 5-10 minutes before and after sports or exercise.     Other Triggers of Asthma  Cold air:  Cover your nose and mouth with a scarf.  Sometimes laughing or crying can be a trigger.  Some medicines and food can trigger asthma.

## 2019-12-03 NOTE — Clinical Note
Suleiman Huerta,She wasn't really sure why she was seeing me. She just had her annual exam. I think she'd gotten a message that her HM wasn't done.Not sure what to do here. We basically just caught up briefly then I did a pelvic exam.

## 2019-12-03 NOTE — PROGRESS NOTES
Ruby is a 34 year old  female who presents for gyn component of annual exam.     Menses are regular q 28-30 days and normal lasting 4 days.  Menses flow: normal.  Patient's last menstrual period was 2019 (approximate).. Using oral contraceptives for contraception.  She is currently considering pregnancy.  Besides routine health maintenance, she has no other health concerns today .  Planning to try to conceive again soon. Will stop OCPs shortly after the new year. Has needed IUI to conceive both pregnancies so plans to return to [facility] soon.    GYNECOLOGIC HISTORY:  Menarche: 14  Age at first intercourse: 23 Number of lifetime partners: 01  Ruby is sexually active with 01 male partner(s) and is currently in monogamous relationship with partner.    History sexually transmitted infections:No STD history  STI testing offered?  Declined  DEEJAY exposure: Unknown  History of abnormal Pap smear: NO - age 30- 65 PAP every 3 years recommended  Family history of breast CA: No  Family history of uterine/ovarian CA: No    Family history of colon CA: No    HEALTH MAINTENANCE:  Cholesterol: (  Cholesterol   Date Value Ref Range Status   2019 230 (H) <200 mg/dL Final     Comment:     Desirable:       <200 mg/dl   10/10/2018 183 <200 mg/dL Final    History of abnormal lipids: Don't remember  Mammo: no . History of abnormal Mammo: No, Not applicable.  Regular Self Breast Exams: No  Calcium/Vitamin D intake: source:  dairy Adequate? Yes  TSH: (  TSH   Date Value Ref Range Status   2014 1.85 0.4 - 5.0 mU/L Final    )  Pap; (  Lab Results   Component Value Date    PAP NIL 07/10/2018    PAP NIL 2015    PAP NIL 05/10/2012    )    HISTORY:  OB History    Para Term  AB Living   2 2 2 0 0 2   SAB TAB Ectopic Multiple Live Births   0 0 0 0 2      # Outcome Date GA Lbr Serge/2nd Weight Sex Delivery Anes PTL Lv   2 Term 18 38w1d  2.85 kg (6 lb 4.5 oz) F Vag-Spont EPI N GARY      Name: Cata  "\"Aishwarya\"      Apgar1: 8  Apgar5: 9   1 Term 06/11/16 37w0d / 03:50 2.551 kg (5 lb 10 oz) F Vag-Spont EPI  GARY      Name: Chino \"Gardenia\"      Apgar1: 2  Apgar5: 6     Past Medical History:   Diagnosis Date     Asthma      Cat allergies      Encounter for supervision of other normal pregnancy 12/8/2015     GERD (gastroesophageal reflux disease)      Group B Streptococcus urinary tract infection affecting pregnancy in first trimester 11/1/2017     Hx of previous reproductive problem      Hypertension     during pregnancy     Mixed hyperlipidemia 11/5/2019     Past Surgical History:   Procedure Laterality Date     HYMENOTOMY       Family History   Problem Relation Age of Onset     Migraines Mother      Hypertension Father      Lipids Father      Diabetes Maternal Grandmother      Diabetes Paternal Grandmother      Cancer Paternal Grandfather         luekemia     Social History     Socioeconomic History     Marital status:      Spouse name: Not on file     Number of children: Not on file     Years of education: Not on file     Highest education level: Not on file   Occupational History     Not on file   Social Needs     Financial resource strain: Not on file     Food insecurity:     Worry: Not on file     Inability: Not on file     Transportation needs:     Medical: Not on file     Non-medical: Not on file   Tobacco Use     Smoking status: Never Smoker     Smokeless tobacco: Never Used   Substance and Sexual Activity     Alcohol use: Yes     Alcohol/week: 0.0 standard drinks     Comment: Maybe once every 6 months     Drug use: No     Sexual activity: Yes     Partners: Male     Birth control/protection: None   Lifestyle     Physical activity:     Days per week: Not on file     Minutes per session: Not on file     Stress: Not on file   Relationships     Social connections:     Talks on phone: Not on file     Gets together: Not on file     Attends Yazidi service: Not on file     Active member of club or " organization: Not on file     Attends meetings of clubs or organizations: Not on file     Relationship status: Not on file     Intimate partner violence:     Fear of current or ex partner: Not on file     Emotionally abused: Not on file     Physically abused: Not on file     Forced sexual activity: Not on file   Other Topics Concern     Parent/sibling w/ CABG, MI or angioplasty before 65F 55M? No   Social History Narrative    Caffeine intake/servings daily - soda 1/day    Calcium intake/servings daily - 2/day, cheese, yogurt    Exercise none times weekly    Sunscreen used - Yes    Seatbelts used - Yes    Guns stored in the home - Yes, locked in a cabinet    Self Breast Exam - No    Pap test up to date -  Yes    Eye exam up to date -  Yes    Dental exam up to date -  Yes    DEXA scan up to date -  No    Flex Sig/Colonoscopy up to date -  No    Mammography up to date -  No    Immunizations reviewed and up to date - Yes    Abuse: Current or Past (Physical, Sexual or Emotional) - No    Do you feel safe in your environment - Yes    Do you cope well with stress - Yes    Do you suffer from insomnia - No    Last updated by: Araseli Dickerson  10/30/2017       Current Outpatient Medications:      albuterol (PROAIR HFA/PROVENTIL HFA/VENTOLIN HFA) 108 (90 Base) MCG/ACT inhaler, Inhale 2 puffs into the lungs every 4 hours as needed for shortness of breath / dyspnea, Disp: 1 Inhaler, Rfl: 1     montelukast (SINGULAIR) 10 MG tablet, TAKE 1 TABLET(10 MG) BY MOUTH AT BEDTIME, Disp: 90 tablet, Rfl: 3     norethindrone-ethinyl estradiol (MICROGESTIN 1/20) 1-20 MG-MCG tablet, Take 1 tablet by mouth daily, Disp: 84 tablet, Rfl: 3     order for DME, Equipment being ordered: Wrist brace (Patient not taking: Reported on 10/31/2019), Disp: 1 each, Rfl: 0   No Known Allergies    Past medical, surgical, social and family history were reviewed and updated in EPIC.    ROS:   C:     NEGATIVE for fever, chills, change in weight  I:        NEGATIVE for worrisome rashes, moles or lesions  E:     NEGATIVE for vision changes or irritation  E/M: NEGATIVE for ear, mouth and throat problems  R:     NEGATIVE for significant cough or SOB  CV:   NEGATIVE for chest pain, palpitations or peripheral edema  GI:     NEGATIVE for nausea, abdominal pain, heartburn, or change in bowel habits  :   NEGATIVE for frequency, dysuria, hematuria, vaginal discharge, or irregular bleeding  M:     NEGATIVE for significant arthralgias or myalgia  N:      NEGATIVE for weakness, dizziness or paresthesias  E:      NEGATIVE for temperature intolerance, skin/hair changes  P:      NEGATIVE for changes in mood or affect.    EXAM:  BP (!) 140/93   Pulse 85   Ht 1.524 m (5')   Wt 87.1 kg (192 lb)   LMP 11/05/2019 (Approximate)   SpO2 98%   Breastfeeding No   BMI 37.50 kg/m     BMI: Body mass index is 37.5 kg/m .  Constitutional: healthy, alert and no distress  Gastrointestinal: Abdomen soft, non-tender, non-distended. No masses, organomegaly.  :  Vulva:  No external lesions, normal female hair distribution, no inguinal adenopathy.    Urethra:  Midline, non-tender, well supported, no discharge  Vagina:  Moist, pink, no abnormal discharge, no lesions  Uterus:  Normal size, anteverted , non-tender, freely mobile  Ovaries:  No masses appreciated, non-tender, mobile  Rectal Exam: deferred       COUNSELING:   Reviewed preventive health counseling, as reflected in patient instructions       Regular exercise       Healthy diet/nutrition       Family planning       Folic Acid Counseling   reports that she has never smoked. She has never used smokeless tobacco.    Body mass index is 37.5 kg/m .  Weight management plan: Discussed healthy diet and exercise guidelines  FRAX Risk Assessment    ASSESSMENT:  34 year old female with satisfactory annual exam  (Z01.419) Encounter for gynecological examination without abnormal finding  (primary encounter diagnosis)  Comment:   Plan: Will start  prenatal vitamins.     (E28.2) PCOS (polycystic ovarian syndrome)  Comment:   Plan: Plans to return to [facility] for fertility treatment if periods do not resume when she stops OCPs.

## 2019-12-03 NOTE — NURSING NOTE
Chief Complaint   Patient presents with     Physical       Initial BP (!) 140/93   Pulse 85   Ht 1.524 m (5')   Wt 87.1 kg (192 lb)   LMP 2019 (Approximate)   SpO2 98%   Breastfeeding No   BMI 37.50 kg/m   Estimated body mass index is 37.5 kg/m  as calculated from the following:    Height as of this encounter: 1.524 m (5').    Weight as of this encounter: 87.1 kg (192 lb).  BP completed using cuff size: large    Questioned patient about current smoking habits.  Pt. has never smoked.          The following HM Due: NONE      The following patient reported/Care Every where data was sent to:  P ABSTRACT QUALITY INITIATIVES [89818]  n/a      patient has appointment for today

## 2019-12-04 ASSESSMENT — ANXIETY QUESTIONNAIRES: GAD7 TOTAL SCORE: 0

## 2019-12-04 ASSESSMENT — ASTHMA QUESTIONNAIRES: ACT_TOTALSCORE: 21

## 2019-12-12 NOTE — TELEPHONE ENCOUNTER
Panel Management Review  Summary:    Type of outreach:    ACT completed at OB vist 12/3/19    ACT completed by: office visit 12/3/19.  Score is 21 and questions regarding IP/ER visits were answered.    Asthma is in control if score is >=20. Chart routed to provider for review if score was less than 20.    Encounter routed to No Action Needed.                                                                                                                               Cammy Rollins,

## 2020-01-21 ENCOUNTER — MEDICAL CORRESPONDENCE (OUTPATIENT)
Dept: HEALTH INFORMATION MANAGEMENT | Facility: CLINIC | Age: 35
End: 2020-01-21

## 2020-01-21 DIAGNOSIS — Z31.41 FERTILITY TESTING: Primary | ICD-10-CM

## 2020-01-24 DIAGNOSIS — Z31.41 FERTILITY TESTING: ICD-10-CM

## 2020-01-24 DIAGNOSIS — E55.9 VITAMIN D DEFICIENCY DISEASE: Primary | ICD-10-CM

## 2020-01-24 LAB
ALT SERPL W P-5'-P-CCNC: 28 U/L (ref 0–50)
AST SERPL W P-5'-P-CCNC: 16 U/L (ref 0–45)
CREAT SERPL-MCNC: 0.59 MG/DL (ref 0.52–1.04)
GFR SERPL CREATININE-BSD FRML MDRD: >90 ML/MIN/{1.73_M2}
HBA1C MFR BLD: 5.8 % (ref 0–5.6)
PROLACTIN SERPL-MCNC: 10 UG/L (ref 3–27)
TSH SERPL DL<=0.005 MIU/L-ACNC: 1.48 MU/L (ref 0.4–4)

## 2020-01-24 PROCEDURE — 84443 ASSAY THYROID STIM HORMONE: CPT | Performed by: OBSTETRICS & GYNECOLOGY

## 2020-01-24 PROCEDURE — 84450 TRANSFERASE (AST) (SGOT): CPT | Performed by: OBSTETRICS & GYNECOLOGY

## 2020-01-24 PROCEDURE — 84460 ALANINE AMINO (ALT) (SGPT): CPT | Performed by: OBSTETRICS & GYNECOLOGY

## 2020-01-24 PROCEDURE — 83520 IMMUNOASSAY QUANT NOS NONAB: CPT | Mod: 90 | Performed by: OBSTETRICS & GYNECOLOGY

## 2020-01-24 PROCEDURE — 82565 ASSAY OF CREATININE: CPT | Performed by: OBSTETRICS & GYNECOLOGY

## 2020-01-24 PROCEDURE — 36415 COLL VENOUS BLD VENIPUNCTURE: CPT | Performed by: OBSTETRICS & GYNECOLOGY

## 2020-01-24 PROCEDURE — 82306 VITAMIN D 25 HYDROXY: CPT | Performed by: OBSTETRICS & GYNECOLOGY

## 2020-01-24 PROCEDURE — 83036 HEMOGLOBIN GLYCOSYLATED A1C: CPT | Performed by: OBSTETRICS & GYNECOLOGY

## 2020-01-24 PROCEDURE — 84146 ASSAY OF PROLACTIN: CPT | Performed by: OBSTETRICS & GYNECOLOGY

## 2020-01-24 PROCEDURE — 99000 SPECIMEN HANDLING OFFICE-LAB: CPT | Performed by: OBSTETRICS & GYNECOLOGY

## 2020-01-26 LAB — MIS SERPL-MCNC: 17.82 NG/ML (ref 0.18–11.71)

## 2020-01-29 ENCOUNTER — MEDICAL CORRESPONDENCE (OUTPATIENT)
Dept: HEALTH INFORMATION MANAGEMENT | Facility: CLINIC | Age: 35
End: 2020-01-29

## 2020-01-29 LAB — DEPRECATED CALCIDIOL+CALCIFEROL SERPL-MC: 33 UG/L (ref 20–75)

## 2020-02-24 ENCOUNTER — TELEPHONE (OUTPATIENT)
Dept: FAMILY MEDICINE | Facility: CLINIC | Age: 35
End: 2020-02-24

## 2020-02-24 NOTE — TELEPHONE ENCOUNTER
Panel Management Review      Patient has the following on her problem list:     Hypertension   Last three blood pressure readings:  BP Readings from Last 3 Encounters:   12/03/19 (!) 140/93   10/31/19 128/78   10/10/18 124/74     Blood pressure: FAILED    HTN Guidelines:  Less than 140/90      Composite cancer screening  Chart review shows that this patient is due/due soon for the following None  Summary:    Patient is due/failing the following:   BP CHECK    Action needed:   Patient needs office visit for Blood Pressure.    Type of outreach:    Sent [employer] message.    Questions for provider review:    None                                                                                                                                    Nirmala Glass CMA

## 2020-03-17 NOTE — TELEPHONE ENCOUNTER
Panel Management Review  Summary:    Type of outreach:    Patient has read her mychart message    Encounter routed to No Action Needed.                                                                                                                                 Colette Brown MA

## 2020-04-21 ENCOUNTER — MEDICAL CORRESPONDENCE (OUTPATIENT)
Dept: HEALTH INFORMATION MANAGEMENT | Facility: CLINIC | Age: 35
End: 2020-04-21

## 2020-04-21 ENCOUNTER — TRANSFERRED RECORDS (OUTPATIENT)
Dept: HEALTH INFORMATION MANAGEMENT | Facility: CLINIC | Age: 35
End: 2020-04-21

## 2020-04-21 DIAGNOSIS — O30.101 TRIPLET GESTATION IN FIRST TRIMESTER, UNSPECIFIED MULTIPLE GESTATION TYPE: Primary | ICD-10-CM

## 2020-04-22 ENCOUNTER — TRANSFERRED RECORDS (OUTPATIENT)
Dept: HEALTH INFORMATION MANAGEMENT | Facility: CLINIC | Age: 35
End: 2020-04-22

## 2020-04-22 DIAGNOSIS — E28.2 PCOS (POLYCYSTIC OVARIAN SYNDROME): ICD-10-CM

## 2020-04-22 NOTE — TELEPHONE ENCOUNTER
Refill request sent from pharmacy for Metformin. Patient started taking end of January when starting fertility treatments. Now pregnant 7+3 weeks with triplets. Does patient still need to be on this?   Medication and pharmacy queued up.   Katrina Puga RN

## 2020-04-22 NOTE — TELEPHONE ENCOUNTER
No, she can stop the metformin altogether.  Will probably need early GCT during this pregnancy due to triplets and history.    Thanks  Veronica Rodgers MD

## 2020-04-24 NOTE — TELEPHONE ENCOUNTER
Received another refill request from pharmacy. TC to patient to find out if anyone discussed message below. Pt stated no, but she talked to her infertility doctor and was advised to stop. Discussed message below. Pt stated understanding. Faxed back to pharmacy indicating not taking anymore.   Katrina Tran RN-BSN

## 2020-04-27 ENCOUNTER — PRE VISIT (OUTPATIENT)
Dept: MATERNAL FETAL MEDICINE | Facility: CLINIC | Age: 35
End: 2020-04-27

## 2020-04-29 NOTE — PROGRESS NOTES
"Maternal-Fetal Medicine Consultation    Ruby Morrison  : 1985  MRN: [id number]    REFERRAL:  Ruby Morrison is a 34 year old sent by Dr. Oh from [facility] infertility clinic for MFM consultation.    HPI:  Ruby Morrison is a 34 year old  at 8w1d by IUI US here for MFM consultation regarding triplet gestation, CHTN, class II Obesity     Ms. Morrison's pregnancy has gone overall well. She reports her blood pressure has been only checked at her visits. She has a blood pressure cuff at home. She reports her 2 past pregnancies were complicated with cHTN and her last one she was delivered at 37 weeks due to worsening hypertension. She denies a history of preeclampsia, cardiovascular event. She denies any family history of early age CV events.     Ms. Morrison also has a history of obesity and PCOS with an A1C in pre-diabetic range. She denies any history of gestational diabetes. She was taking metformin at the while attempting to conceive. She is not currently taking low dose aspirin.     Pregnancy complicated by:  -Triplet pregnancy s/p ovulation induction/IUI  -Chronic hypertension   -Glucose intolerance (A1C 5.8%) with a normal fasting glucose  -Class II obesity   -Mild intermittent asthma    Dating:    Assisted reproduction: Yes, IUI  Assigned EDC: 2020    Obstetrics History:  OB History    Para Term  AB Living   3 2 2 0 0 2   SAB TAB Ectopic Multiple Live Births   0 0 0 0 2      # Outcome Date GA Lbr Serge/2nd Weight Sex Delivery Anes PTL Lv   3 Current            2 Term 18 38w1d  2.85 kg (6 lb 4.5 oz) F Vag-Spont EPI N GARY      Name: Cata \"Aishwarya\"      Apgar1: 8  Apgar5: 9   1 Term 16 37w0d / 03:50 2.551 kg (5 lb 10 oz) F Vag-Spont EPI  GARY      Name: Chino \"Gardenia\"      Apgar1: 2  Apgar5: 6      Gynecologic History:  - Last Pap: NILM, HPV: Negative ()  - Denies any history of abnormal pap smears  - Denies prior cervical surgery or procedures  - Denies any history of " "frequent UTIs, vaginal infections, or STIs    Past Medical History:  -Chronic hypertension   -Glucose intolerance (A1C 5.8%) with a normal fasting glucose  -Class II obesity   -Hyerlipidemia    Past Surgical History:  Past Surgical History:   Procedure Laterality Date     HYMENOTOMY        Current Medications:    Prior to Admission medications    Medication Sig Last Dose Taking? Auth Provider   albuterol (PROAIR HFA/PROVENTIL HFA/VENTOLIN HFA) 108 (90 Base) MCG/ACT inhaler Inhale 2 puffs into the lungs every 4 hours as needed for shortness of breath / dyspnea Taking  Jenn Lackey NP   metFORMIN (GLUCOPHAGE) 500 MG tablet Take 1 tablet once a day for a week, then two tablets once a day for a week, and then three tablets once a day if stomach tolerates it.   Tomasa Bernal MD   montelukast (SINGULAIR) 10 MG tablet TAKE 1 TABLET(10 MG) BY MOUTH AT BEDTIME Taking  Jenn Lackey NP   Prometrium 200mg  Take 1 tablet per vagina daily Taking  Jenn Lackey NP       Allergies:  Patient has no known allergies.    Social History:   Status:   Denies use of alcohol, drugs or smoking.    Family History:  Denies history of genetic disorders, preeeclampsia, thromboembolic disease, bleeding disorders, mental retardation    ROS:  10-point ROS negative except as in HPI     PHYSICAL EXAM:  /74   Pulse 81   Resp 20   Wt 85.2 kg (187 lb 14.4 oz)   SpO2 99%   BMI 36.70 kg/m      General: NAD  Resp: Non-labored respirations    Prenatal Labs:    Ordered today     Genetic Testing:   Declined, not interested.     Ultrasounds:   Please see \"Imaging\" tab under \"Chart Review\" for details of today's US at the [facility].    ASSESSMENT:  Ruby Morrison is a 34 year old  at 8w1d by IUI US here for [facility] consultation regarding.     -Triplet pregnancy s/p ovulation induction/IUI  -Chronic hypertension   -Glucose intolerance (A1C 5.8%) with a normal fasting glucose  -Class II obesity "   -Mild intermittent asthma    #Triplet pregnancy s/p ovulation induction/IUI    We discussed with Ms. Morrison that triplet gestation is associated with higher rates of almost every potential complication of pregnancy, with the exceptions of post-term pregnancy and macrosomia.  The rates of both maternal and fetal/ risks are increased. With regards to fetal/ outcome, the most serious risk is spontaneous  delivery due to  labor and PPROM, which plays a major role in the increased  mortality and morbidity in these infants.   The average triplet gestation delivers at 32 weeks (versus 35 for twins and 39 for singletons).  A significant proportion (37-41 percent) deliver at less than 32 weeks and (97 percent) before 37 weeks.  Most triplets (95 percent) are low-birth weight (<2500 grams) and a significant proportion (37 percent) are very low birthweight (<1500 grams).       We reviewed that there are higher rates of placental abruption, fetal growth restriction (FGR) and congenital anomalies also contribute to adverse outcome in multiple gestations.      The risk of aneuploidy is higher in triplet gestations. However she declined genetic screening.     Triplet gestation also increases the maternal risks of pregnancy including increased rates of hyperemesis, gestational diabetes (22 percent), anemia, uterine atony, hemorrhage, need for transfusion, possibly hysterectomy, cholestasis, postpartum depression, thrombocytopenia and hypertensive disorders including preeclampsia (20-40 percent), HELLP syndrome and acute fatty liver. Women carrying triplet gestations also have more frequent ultrasounds, office visits, and a higher likelihood of prolonged hospitalization.  Assisted reproductive technology is also thought to increase some of these risks.     We discussed that although women with triplet gestations are at increased risk for  labor, many interventions effective in  singletons have not been shown to be effective in triplets.  Bed rest, cerclage, pessary, prophylactic tocolysis and progesterone have not been shown to decrease the risk of  birth.     Delivery in the 35th week has been recommended by some experts in uncomplicated trichorionic/triamniotic twin gestations.   delivery is the recommended mode of delivery.    #Chronic hypertension  We discussed that the concern with chronic hypertension is that such patients are more likely to develop preeclampsia during the pregnancy, with a risk of 20-50%.  Women with chronic hypertension are at increased risk for early-onset preeclampsia.  Low dose aspirin has been used to lower this risk.  Chronic hypertension also increases the risk of maternal stroke, pulmonary edema, renal failure, gestational diabetes, iatrogenic  birth, fetal growth restriction, placental abruption and  mortality rate including stillbirth    Without baseline laboratory assessment, it may be difficult to distinguish an exacerbation of hypertension from preeclampsia, especially in the third trimester. We reviewed that it is   generally anticipated that blood pressure will gradually decrease during early pregnancy, reaching at carina at 28-32 weeks, and then slowly rise to pre-pregnancy levels.     Guidelines suggest starting antihypertensive medication in women with chronic hypertension if the systolic blood pressure is persistently 160 mmHg or higher or the diastolic blood pressure is persistently 110 mmHg or higher.  If there is evidence of end organ damage (renal insufficiency, left ventricular hypertrophy or severe thrombocytopenia) a lower threshold of 150 mmHg systolic and/or 100 mm Hg diastolic is recommended. Treatment with antihypertensives is generally used to maintain blood pressure in the general range of 120-160 mmHg systolic and  mmHg diastolic. Lower blood pressures may increase the risk of fetal growth  restriction. The primary reason for antihypertensive is to reduce the risk of maternal stroke. Recommended antihypertensives with studied safety profiles in pregnancy include nifedipine and labetolol. Unfortunately, even exquisite control of blood pressure does not reduce the risk of superimposed preeclampsia.     #Glucose intolerance (A1C 5.8%) with a normal fasting glucose  Discussed her risk for gestation diabetes is increased and recommend early diabetes screening.     RECOMMENDATIONS:    We will assume Ms. Morrison's primary obstetric care.     #Triplet pregnancy s/p ovulation induction/IUI  -Comprehensive anatomic survey at 18 weeks.  -Ultrasound assessment of fetal growth every three to four weeks, closer interval as needed if abnormalities are noted.   - assessment of fetal well-being with weekly BPP at 32 weeks.  -Weight gain 42 pounds for obese women.    -Weekly office visits after 24 weeks to screen for preeclampsia and  labor signs and symptoms  -Prenatal labs ordered today   -Plan for delivery at 35 weeks, earlier if complications arise by  section.     #Chronic hypertension   -We plan to start necessary medications and titrate as needed to achieve blood pressure goal  -Discussed Low dose aspirin for preeclampsia prevention, ideally started between 12-16 weeks  -We ordered the following baseline studies:   o Electrocardiogram, if abnormal this should be followed up with an echocardiogram  o Liver function tests, creatinine and blood urea nitrogen, serum electrolytes  o Spot protein/creatinine ratio   -Close monitoring for evidence of superimposed preeclampsia  o Repeat labs as clinically indicated, with a low threshold to rule-out superimposed preeclampsia, especially if it is thought that medication may need to be increased  o Close monitoring of maternal blood pressure in office and with home blood pressure monitoring, the patient's blood pressure cuff should be titrated in the  office  o Signs and symptoms of preeclampsia reviewed  -Postpartum, medication should be adjusted to maintain the systolic blood pressure below 150 mm Hg and the diastolic blood pressure below 100 mm Hg    Patient will return in 2 weeks for ultrasound,  will do 1 hr GCT at next visit.     The patient was seen and evaluated with Dr. Arango    Thank you for allowing us to participate in the care of your patient. Please do not hesitate to contact us if you have further questions regarding the management of your patient.     Jacky Griffiths MD  [facility] Fellow   [facility]  4/30/2020  3:37 PM    Physician Attestation   I, Anand Arango MD, saw this patient and agree with the findings and plan of care as documented in the note.      Items personally reviewed/procedural attestation: History and plan of care. A total time of 25 minutes was spent in face-to-face counseling on the planof care for this triplet pregnancy.    Anand Arango MD

## 2020-04-30 ENCOUNTER — OFFICE VISIT (OUTPATIENT)
Dept: MATERNAL FETAL MEDICINE | Facility: CLINIC | Age: 35
End: 2020-04-30
Attending: OBSTETRICS & GYNECOLOGY
Payer: COMMERCIAL

## 2020-04-30 ENCOUNTER — HOSPITAL ENCOUNTER (OUTPATIENT)
Dept: ULTRASOUND IMAGING | Facility: CLINIC | Age: 35
End: 2020-04-30
Attending: OBSTETRICS & GYNECOLOGY
Payer: COMMERCIAL

## 2020-04-30 VITALS
DIASTOLIC BLOOD PRESSURE: 74 MMHG | BODY MASS INDEX: 36.7 KG/M2 | SYSTOLIC BLOOD PRESSURE: 122 MMHG | HEART RATE: 81 BPM | OXYGEN SATURATION: 99 % | WEIGHT: 187.9 LBS | RESPIRATION RATE: 20 BRPM

## 2020-04-30 DIAGNOSIS — O30.101 TRIPLET GESTATION IN FIRST TRIMESTER, UNSPECIFIED MULTIPLE GESTATION TYPE: ICD-10-CM

## 2020-04-30 DIAGNOSIS — O30.101 TRIPLET GESTATION IN FIRST TRIMESTER, UNSPECIFIED MULTIPLE GESTATION TYPE: Primary | ICD-10-CM

## 2020-04-30 LAB
ABO + RH BLD: NORMAL
ABO + RH BLD: NORMAL
ALBUMIN UR-MCNC: NEGATIVE MG/DL
ALT SERPL W P-5'-P-CCNC: 25 U/L (ref 0–50)
APPEARANCE UR: CLEAR
AST SERPL W P-5'-P-CCNC: 15 U/L (ref 0–45)
BASOPHILS # BLD AUTO: 0 10E9/L (ref 0–0.2)
BASOPHILS NFR BLD AUTO: 0.2 %
BILIRUB UR QL STRIP: NEGATIVE
BLD GP AB SCN SERPL QL: NORMAL
BLOOD BANK CMNT PATIENT-IMP: NORMAL
COLOR UR AUTO: ABNORMAL
CREAT SERPL-MCNC: 0.5 MG/DL (ref 0.52–1.04)
CREAT UR-MCNC: 34 MG/DL
DIFFERENTIAL METHOD BLD: ABNORMAL
EOSINOPHIL # BLD AUTO: 0.1 10E9/L (ref 0–0.7)
EOSINOPHIL NFR BLD AUTO: 0.9 %
ERYTHROCYTE [DISTWIDTH] IN BLOOD BY AUTOMATED COUNT: 13.5 % (ref 10–15)
GFR SERPL CREATININE-BSD FRML MDRD: >90 ML/MIN/{1.73_M2}
GLUCOSE UR STRIP-MCNC: NEGATIVE MG/DL
HCT VFR BLD AUTO: 40.7 % (ref 35–47)
HGB BLD-MCNC: 13.7 G/DL (ref 11.7–15.7)
HGB UR QL STRIP: NEGATIVE
IMM GRANULOCYTES # BLD: 0.1 10E9/L (ref 0–0.4)
IMM GRANULOCYTES NFR BLD: 0.3 %
KETONES UR STRIP-MCNC: NEGATIVE MG/DL
LEUKOCYTE ESTERASE UR QL STRIP: NEGATIVE
LYMPHOCYTES # BLD AUTO: 3.7 10E9/L (ref 0.8–5.3)
LYMPHOCYTES NFR BLD AUTO: 23.2 %
MCH RBC QN AUTO: 30.7 PG (ref 26.5–33)
MCHC RBC AUTO-ENTMCNC: 33.7 G/DL (ref 31.5–36.5)
MCV RBC AUTO: 91 FL (ref 78–100)
MONOCYTES # BLD AUTO: 0.8 10E9/L (ref 0–1.3)
MONOCYTES NFR BLD AUTO: 5.1 %
MUCOUS THREADS #/AREA URNS LPF: PRESENT /LPF
NEUTROPHILS # BLD AUTO: 11.1 10E9/L (ref 1.6–8.3)
NEUTROPHILS NFR BLD AUTO: 70.3 %
NITRATE UR QL: NEGATIVE
NRBC # BLD AUTO: 0 10*3/UL
NRBC BLD AUTO-RTO: 0 /100
PH UR STRIP: 6.5 PH (ref 5–7)
PLATELET # BLD AUTO: 330 10E9/L (ref 150–450)
PROT UR-MCNC: 0.06 G/L
PROT/CREAT 24H UR: 0.17 G/G CR (ref 0–0.2)
RBC # BLD AUTO: 4.46 10E12/L (ref 3.8–5.2)
RBC #/AREA URNS AUTO: <1 /HPF (ref 0–2)
SOURCE: ABNORMAL
SP GR UR STRIP: 1.01 (ref 1–1.03)
SPECIMEN EXP DATE BLD: NORMAL
UROBILINOGEN UR STRIP-MCNC: NORMAL MG/DL (ref 0–2)
WBC # BLD AUTO: 15.7 10E9/L (ref 4–11)
WBC #/AREA URNS AUTO: <1 /HPF (ref 0–5)

## 2020-04-30 PROCEDURE — 87491 CHLMYD TRACH DNA AMP PROBE: CPT | Performed by: OBSTETRICS & GYNECOLOGY

## 2020-04-30 PROCEDURE — 86787 VARICELLA-ZOSTER ANTIBODY: CPT | Performed by: OBSTETRICS & GYNECOLOGY

## 2020-04-30 PROCEDURE — 86762 RUBELLA ANTIBODY: CPT | Performed by: OBSTETRICS & GYNECOLOGY

## 2020-04-30 PROCEDURE — 86900 BLOOD TYPING SEROLOGIC ABO: CPT | Performed by: OBSTETRICS & GYNECOLOGY

## 2020-04-30 PROCEDURE — 87086 URINE CULTURE/COLONY COUNT: CPT | Performed by: OBSTETRICS & GYNECOLOGY

## 2020-04-30 PROCEDURE — 86850 RBC ANTIBODY SCREEN: CPT | Performed by: OBSTETRICS & GYNECOLOGY

## 2020-04-30 PROCEDURE — 85025 COMPLETE CBC W/AUTO DIFF WBC: CPT | Performed by: OBSTETRICS & GYNECOLOGY

## 2020-04-30 PROCEDURE — 84156 ASSAY OF PROTEIN URINE: CPT | Performed by: OBSTETRICS & GYNECOLOGY

## 2020-04-30 PROCEDURE — 87591 N.GONORRHOEAE DNA AMP PROB: CPT | Performed by: OBSTETRICS & GYNECOLOGY

## 2020-04-30 PROCEDURE — 84460 ALANINE AMINO (ALT) (SGPT): CPT | Performed by: OBSTETRICS & GYNECOLOGY

## 2020-04-30 PROCEDURE — 86901 BLOOD TYPING SEROLOGIC RH(D): CPT | Performed by: OBSTETRICS & GYNECOLOGY

## 2020-04-30 PROCEDURE — 76802 OB US < 14 WKS ADDL FETUS: CPT

## 2020-04-30 PROCEDURE — 87389 HIV-1 AG W/HIV-1&-2 AB AG IA: CPT | Performed by: OBSTETRICS & GYNECOLOGY

## 2020-04-30 PROCEDURE — 36415 COLL VENOUS BLD VENIPUNCTURE: CPT | Performed by: OBSTETRICS & GYNECOLOGY

## 2020-04-30 PROCEDURE — 86780 TREPONEMA PALLIDUM: CPT | Performed by: OBSTETRICS & GYNECOLOGY

## 2020-04-30 PROCEDURE — 82565 ASSAY OF CREATININE: CPT | Performed by: OBSTETRICS & GYNECOLOGY

## 2020-04-30 PROCEDURE — 84450 TRANSFERASE (AST) (SGOT): CPT | Performed by: OBSTETRICS & GYNECOLOGY

## 2020-04-30 PROCEDURE — G0463 HOSPITAL OUTPT CLINIC VISIT: HCPCS | Mod: 25,ZF

## 2020-04-30 PROCEDURE — 81001 URINALYSIS AUTO W/SCOPE: CPT | Performed by: OBSTETRICS & GYNECOLOGY

## 2020-04-30 PROCEDURE — 87340 HEPATITIS B SURFACE AG IA: CPT | Performed by: OBSTETRICS & GYNECOLOGY

## 2020-04-30 ASSESSMENT — PAIN SCALES - GENERAL: PAINLEVEL: NO PAIN (0)

## 2020-04-30 NOTE — NURSING NOTE
Ruby here today for 1st tri complete U/S of triplets and 1st obv at [facility]. Patient reports +Fm, denies ctx, denies SRoM, and denies vag bleeding.  Patient was given new folder of patient information. Dr. Arango and Dr. Green in today. Patient had all new OB labs done today. Pt to have U/S and f/u obv in 2 weeks. Pt was escorted to lab. Patient reports that she will have 5 children under 5 years old, so she is a little stressed about that, but has family support. Maria Elena Estes RN

## 2020-05-01 ENCOUNTER — TELEPHONE (OUTPATIENT)
Dept: MATERNAL FETAL MEDICINE | Facility: CLINIC | Age: 35
End: 2020-05-01

## 2020-05-01 LAB
BACTERIA SPEC CULT: NORMAL
C TRACH DNA SPEC QL NAA+PROBE: NEGATIVE
HBV SURFACE AG SERPL QL IA: NONREACTIVE
HIV 1+2 AB+HIV1 P24 AG SERPL QL IA: NONREACTIVE
Lab: NORMAL
N GONORRHOEA DNA SPEC QL NAA+PROBE: NEGATIVE
RUBV IGG SERPL IA-ACNC: 25 IU/ML
SPECIMEN SOURCE: NORMAL
T PALLIDUM AB SER QL: NONREACTIVE
VZV IGG SER QL IA: 3.9 AI (ref 0–0.8)

## 2020-05-01 NOTE — TELEPHONE ENCOUNTER
Phone call to pt to let know her labs are WNL at this time. Pt has a follow up set with MFM in 2 weeks. No further questions at this time. Maren Harris RN

## 2020-05-12 NOTE — PROGRESS NOTES
"Maternal fetal Medicine OB Follow up visit.     Ruby Morrison  : 1985  MRN: [id number]    CC: OB Follow-up    Subjective:  Ruby Morrison is a 34 year old  at 10w1d presenting for routine OB follow-up for tri/tri triplet pregnancy. Today, she is feeling well. Reports she is feeling at ease with a triplet gestation and realizes much is out of her control. Has recently informed family. They are supportive, but appropriately cautious. 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 A staff member recently tested positive for COVID-19, but she had not had any contact with that staff member for 3 weeks prior to diagnosis. Denies s/s of COVID-19 including fever, cough, SOB. Has adequate PPE while at work. Remains at home otherwise.     She denies regular, painful contractions, denies loss of fluid or vaginal bleeding.    Has been noting increased constipation since becoming pregnant. Reports occasionally using miralax and colace. States she does not monitor how much water she drinks.    OB Hx:  OB History    Para Term  AB Living   3 2 2 0 0 2   SAB TAB Ectopic Multiple Live Births   0 0 0 0 2      # Outcome Date GA Lbr Serge/2nd Weight Sex Delivery Anes PTL Lv   3 Current            2 Term 18 38w1d  2.85 kg (6 lb 4.5 oz) F Vag-Spont EPI N GARY      Name: Cata \"Aishwarya\"      Apgar1: 8  Apgar5: 9   1 Term 16 37w0d / 03:50 2.551 kg (5 lb 10 oz) F Vag-Spont EPI  GARY      Name: Chino \"Gardenia\"      Apgar1: 2  Apgar5: 6         Objective:  /75   Pulse 87   Resp 20   Wt 85.8 kg (189 lb 3.2 oz)   SpO2 99%   BMI 36.95 kg/m      Physical Exam  HENT:      Head: Normocephalic.   Neck:      Musculoskeletal: Normal range of motion.   Pulmonary:      Effort: Pulmonary effort is normal. No respiratory distress.   Musculoskeletal: Normal range of motion.   Neurological:      Mental Status: " She is alert and oriented to person, place, and time.   Skin:     General: Skin is warm and dry.   Psychiatric:         Mood and Affect: Mood normal.         Behavior: Behavior normal.         OB Ultrasound:  Please see Dr. Cordova's associated note under imaging tab for details.    Assessment/Plan:  34 year old  at 10w3d by IUI US, here for follow-up OB visit.    Pregnancy has been complicated by:   -Triplet pregnancy s/p ovulation induction/IUI  -Chronic hypertension   -Glucose intolerance (A1C 5.8%) with a normal fasting glucose  -Class II obesity   -Mild intermittent asthma    #1 Triplet pregnancy s/p ovulation induction/IUI  -Comprehensive anatomic survey at 18 weeks.  -Patient declines selective reduction and genetic screening.  -Ultrasound assessment of fetal growth every three to four weeks, closer interval as needed if abnormalities are noted.   - assessment of fetal well-being with weekly BPP at 32 weeks.  -Weight gain 42 pounds for obese women.    -Weekly office visits after 24 weeks to screen for preeclampsia and  labor signs and symptoms  -Prenatal labs ordered today   -Plan for delivery at 35 weeks, earlier if complications arise by  section.      #Chronic hypertension   -We plan to start necessary medications and titrate as needed to achieve blood pressure goal  -Discussed Low dose aspirin for preeclampsia prevention, ideally started between 12-16 weeks. Prescription sent today.  -Recent baseline labs WNL.  -Close monitoring for evidence of superimposed preeclampsia  ? Repeat labs as clinically indicated, with a low threshold to rule-out superimposed preeclampsia, especially if it is thought that medication may need to be increased  ? Close monitoring of maternal blood pressure in office and with home blood pressure monitoring, the patient's blood pressure cuff should be titrated in the office  ? Signs and symptoms of preeclampsia reviewed  -Postpartum, medication  should be adjusted to maintain the systolic blood pressure below 150 mm Hg and the diastolic blood pressure below 100 mm Hg    #Routine PNC:  - NOB Labs reviewed: Rh pos, ab neg, rubella immune, Hep B SAg NR, HIV NR, RPR NR, GC/Chlam neg  - Early GCT today given A1c 5.8% and obesity   -Pap smear: NILM, HPV neg (07/2018)    RTC in 4 weeks.    15 minutes was spent face to face with the patient today discussing her history, diagnosis, and follow-up plan as noted above. Over 50% of the visit was spent in counseling and coordination of care.    Total Visit Time: 15 minutes.     Enriqueta Girard CNM on 5/14/2020 at 1:17 PM

## 2020-05-14 ENCOUNTER — HOSPITAL ENCOUNTER (OUTPATIENT)
Dept: ULTRASOUND IMAGING | Facility: CLINIC | Age: 35
End: 2020-05-14
Attending: OBSTETRICS & GYNECOLOGY
Payer: COMMERCIAL

## 2020-05-14 ENCOUNTER — OFFICE VISIT (OUTPATIENT)
Dept: MATERNAL FETAL MEDICINE | Facility: CLINIC | Age: 35
End: 2020-05-14
Attending: OBSTETRICS & GYNECOLOGY
Payer: COMMERCIAL

## 2020-05-14 VITALS
SYSTOLIC BLOOD PRESSURE: 130 MMHG | BODY MASS INDEX: 36.95 KG/M2 | RESPIRATION RATE: 20 BRPM | WEIGHT: 189.2 LBS | OXYGEN SATURATION: 99 % | HEART RATE: 87 BPM | DIASTOLIC BLOOD PRESSURE: 75 MMHG

## 2020-05-14 DIAGNOSIS — O09.91 SUPERVISION OF HIGH RISK PREGNANCY IN FIRST TRIMESTER: Primary | ICD-10-CM

## 2020-05-14 DIAGNOSIS — O30.101 TRIPLET GESTATION IN FIRST TRIMESTER, UNSPECIFIED MULTIPLE GESTATION TYPE: ICD-10-CM

## 2020-05-14 DIAGNOSIS — I10 ESSENTIAL HYPERTENSION: ICD-10-CM

## 2020-05-14 LAB — GLUCOSE 1H P 50 G GLC PO SERPL-MCNC: 121 MG/DL (ref 60–129)

## 2020-05-14 PROCEDURE — 76801 OB US < 14 WKS SINGLE FETUS: CPT

## 2020-05-14 PROCEDURE — 82950 GLUCOSE TEST: CPT | Performed by: OBSTETRICS & GYNECOLOGY

## 2020-05-14 PROCEDURE — 36415 COLL VENOUS BLD VENIPUNCTURE: CPT | Performed by: OBSTETRICS & GYNECOLOGY

## 2020-05-14 PROCEDURE — G0463 HOSPITAL OUTPT CLINIC VISIT: HCPCS | Mod: 25,ZF

## 2020-05-14 ASSESSMENT — PAIN SCALES - GENERAL: PAINLEVEL: NO PAIN (0)

## 2020-05-14 NOTE — NURSING NOTE
Ruby here for 1st tri complete and f/u obv due to preg c/b triplets. Patient reports no FM yet, denies ctx, denies SRoM, and denies vag bleeding. Pt is doing 1hr GcT today. Enriqueta Girard CNM and Dr. Cordova in to talk with pt. Pt to come back in 4 weeks for limited U/S of triplets and f/u obv. Then pt will need 18 week comp U/S of triplets in 8 weeks with ObV. Patient is feeling well and was escorted to lab. Patient will contact [facility] with any questions and/or concerns. Maria Elena Estes RN

## 2020-05-27 ENCOUNTER — MYC MEDICAL ADVICE (OUTPATIENT)
Dept: FAMILY MEDICINE | Facility: CLINIC | Age: 35
End: 2020-05-27

## 2020-06-11 ENCOUNTER — OFFICE VISIT (OUTPATIENT)
Dept: MATERNAL FETAL MEDICINE | Facility: CLINIC | Age: 35
End: 2020-06-11
Attending: OBSTETRICS & GYNECOLOGY
Payer: COMMERCIAL

## 2020-06-11 ENCOUNTER — HOSPITAL ENCOUNTER (OUTPATIENT)
Dept: ULTRASOUND IMAGING | Facility: CLINIC | Age: 35
End: 2020-06-11
Attending: OBSTETRICS & GYNECOLOGY
Payer: COMMERCIAL

## 2020-06-11 VITALS
SYSTOLIC BLOOD PRESSURE: 121 MMHG | OXYGEN SATURATION: 98 % | HEART RATE: 88 BPM | BODY MASS INDEX: 38.79 KG/M2 | RESPIRATION RATE: 20 BRPM | DIASTOLIC BLOOD PRESSURE: 75 MMHG | WEIGHT: 198.6 LBS

## 2020-06-11 DIAGNOSIS — O30.101 TRIPLET GESTATION IN FIRST TRIMESTER, UNSPECIFIED MULTIPLE GESTATION TYPE: ICD-10-CM

## 2020-06-11 DIAGNOSIS — Z86.79 HISTORY OF CHRONIC HYPERTENSION: ICD-10-CM

## 2020-06-11 DIAGNOSIS — O09.92 SUPERVISION OF HIGH RISK PREGNANCY IN SECOND TRIMESTER: Primary | ICD-10-CM

## 2020-06-11 DIAGNOSIS — O30.101 TRIPLET GESTATION IN FIRST TRIMESTER, UNSPECIFIED MULTIPLE GESTATION TYPE: Primary | ICD-10-CM

## 2020-06-11 PROCEDURE — G0463 HOSPITAL OUTPT CLINIC VISIT: HCPCS | Mod: 25,ZF

## 2020-06-11 PROCEDURE — 76815 OB US LIMITED FETUS(S): CPT

## 2020-06-11 ASSESSMENT — PAIN SCALES - GENERAL: PAINLEVEL: NO PAIN (0)

## 2020-06-11 NOTE — PROGRESS NOTES
"Please see \"Imaging\" tab under \"Chart Review\" for details of today's US at the [facility].    Anand Arango MD  Maternal-Fetal Medicine      "

## 2020-06-11 NOTE — NURSING NOTE
Ruby here today for limited U/S and f/u obv due to preg c/b triplets and HTN. Patient reports+FM, denies ctx, denies sRom, and denies vag bleeding.  Patient reports last week her BP's were 160's/100's and she had a headache, but now feels better. Writer explained to pt that she should call MFM for increased BP. Patient reports round ligament pain and tail bone pain today. Patient had limited U/S and +FHR x 3. Enriqueta Girard CNM in to talk with pt. Patient has U/S and f/u obv scheduled for 7/9. Patient left amb and stable and will call for increased BP or any other questions and/or concerns. Maria Elena Estes RN

## 2020-06-11 NOTE — PROGRESS NOTES
"S:Ruby Morrison  at 14w3d. Feels well, but already noting RL, sacral pain, and difficulty sleeping. Reports that she has not been regularly checking her BP at home, but with one check last week it was 160s/100s. She did not call the clinic to inform staff. Reports that she has occasional headaches, but they are not different than her baseline history of headaches. Denies other s/s of preeclampsia. Normotensive today. She reports that she was never placed on BP meds during a pregnancy, but with her last she was discharged from the hospital on medication and remained on that for a few weeks.  Fetal movement Yes  Denies loss of fluid/vb/contractions/pelvic pain    O:  /75   Pulse 88   Resp 20   Wt 90.1 kg (198 lb 9.6 oz)   SpO2 98%   BMI 38.79 kg/m     Exam:  Constitutional: healthy, alert and no distress  Respiratory: Respirations even and unlabored  Psychiatric: mentation appears normal and affect normal/bright    ULTRASOUND: Please see \"Imaging\" tab under \"Chart Review\" for details of today's US at the [facility].    A:  Encounter Diagnoses   Name Primary?     Triplet gestation in first trimester, unspecified multiple gestation type      Supervision of high risk pregnancy in second trimester Yes     History of chronic hypertension        P: Recommend that she take her BP at home once per day and reviewed parameters that would warrant hypertensive meds in pregnancy. Asked patient to bring BP cuff with her to her next appointment to have home cuff calibrated in office.   Reviewed comfort measures and use of maternity support belt for RL and sacral pain. Patient declined prescription for DME, but aware she can call the clinic to request if she finds her insurance will cover it.  Discussed options for AFP today: declined   Anatomy ultrasound next visit at 18 weeks  Return to clinic 4 weeks    15 minutes was spent face to face with the patient today discussing her history, diagnosis, and " follow-up plan as noted above. Over 50% of the visit was spent in counseling and coordination of care.    Total Visit Time: 15 minutes.     Enriqueta Girard CNM on 6/11/2020 at 9:38 AM

## 2020-07-09 ENCOUNTER — OFFICE VISIT (OUTPATIENT)
Dept: MATERNAL FETAL MEDICINE | Facility: CLINIC | Age: 35
End: 2020-07-09
Attending: OBSTETRICS & GYNECOLOGY
Payer: COMMERCIAL

## 2020-07-09 ENCOUNTER — HOSPITAL ENCOUNTER (OUTPATIENT)
Dept: ULTRASOUND IMAGING | Facility: CLINIC | Age: 35
End: 2020-07-09
Attending: OBSTETRICS & GYNECOLOGY
Payer: COMMERCIAL

## 2020-07-09 VITALS
OXYGEN SATURATION: 100 % | WEIGHT: 201.5 LBS | BODY MASS INDEX: 39.35 KG/M2 | SYSTOLIC BLOOD PRESSURE: 122 MMHG | DIASTOLIC BLOOD PRESSURE: 75 MMHG | HEART RATE: 90 BPM

## 2020-07-09 DIAGNOSIS — O09.91 SUPERVISION OF HIGH RISK PREGNANCY IN FIRST TRIMESTER: ICD-10-CM

## 2020-07-09 DIAGNOSIS — O09.91 SUPERVISION OF HIGH RISK PREGNANCY IN FIRST TRIMESTER: Primary | ICD-10-CM

## 2020-07-09 DIAGNOSIS — O30.101 TRIPLET GESTATION IN FIRST TRIMESTER, UNSPECIFIED MULTIPLE GESTATION TYPE: ICD-10-CM

## 2020-07-09 DIAGNOSIS — O09.92 SUPERVISION OF HIGH RISK PREGNANCY IN SECOND TRIMESTER: Primary | ICD-10-CM

## 2020-07-09 DIAGNOSIS — O30.132 TRICHORIONIC TRIAMNIOTIC TRIPLET PREGNANCY IN SECOND TRIMESTER: Primary | ICD-10-CM

## 2020-07-09 LAB
ALT SERPL W P-5'-P-CCNC: 21 U/L (ref 0–50)
AST SERPL W P-5'-P-CCNC: 19 U/L (ref 0–45)
CREAT SERPL-MCNC: 0.5 MG/DL (ref 0.52–1.04)
CREAT UR-MCNC: 65 MG/DL
ERYTHROCYTE [DISTWIDTH] IN BLOOD BY AUTOMATED COUNT: 12.5 % (ref 10–15)
GFR SERPL CREATININE-BSD FRML MDRD: >90 ML/MIN/{1.73_M2}
HCT VFR BLD AUTO: 38.8 % (ref 35–47)
HGB BLD-MCNC: 12.6 G/DL (ref 11.7–15.7)
MCH RBC QN AUTO: 30.6 PG (ref 26.5–33)
MCHC RBC AUTO-ENTMCNC: 32.5 G/DL (ref 31.5–36.5)
MCV RBC AUTO: 94 FL (ref 78–100)
PLATELET # BLD AUTO: 312 10E9/L (ref 150–450)
PROT UR-MCNC: 0.08 G/L
PROT/CREAT 24H UR: 0.13 G/G CR (ref 0–0.2)
RBC # BLD AUTO: 4.12 10E12/L (ref 3.8–5.2)
WBC # BLD AUTO: 15.2 10E9/L (ref 4–11)

## 2020-07-09 PROCEDURE — 36415 COLL VENOUS BLD VENIPUNCTURE: CPT | Performed by: ADVANCED PRACTICE MIDWIFE

## 2020-07-09 PROCEDURE — 84460 ALANINE AMINO (ALT) (SGPT): CPT | Performed by: ADVANCED PRACTICE MIDWIFE

## 2020-07-09 PROCEDURE — 82565 ASSAY OF CREATININE: CPT | Performed by: ADVANCED PRACTICE MIDWIFE

## 2020-07-09 PROCEDURE — G0463 HOSPITAL OUTPT CLINIC VISIT: HCPCS | Mod: 25,ZF

## 2020-07-09 PROCEDURE — 85027 COMPLETE CBC AUTOMATED: CPT | Performed by: ADVANCED PRACTICE MIDWIFE

## 2020-07-09 PROCEDURE — 84156 ASSAY OF PROTEIN URINE: CPT | Performed by: ADVANCED PRACTICE MIDWIFE

## 2020-07-09 PROCEDURE — 76812 OB US DETAILED ADDL FETUS: CPT

## 2020-07-09 PROCEDURE — 84450 TRANSFERASE (AST) (SGOT): CPT | Performed by: ADVANCED PRACTICE MIDWIFE

## 2020-07-09 NOTE — PROGRESS NOTES
Pt presents to [facility] for assessment and evaluation of her pregnancy due to triplets. Pt states she is overall doing well. States she is not able to feel all of the babies at this time moving. States no lof or bleeding. No contractions. Pt states she continues to take her blood pressure at home and it has been wnl. Pt had us done and reviewed by Dr. Lang. See epic for today's findings. Pt met with Enriqueta Girard today for obv. See flow sheets and note. Pt will return to [facility] in 4 weeks for Rl2 and obv. Will have repeat labs today for baseline preeclamptic. Questions answered. Discharged stable. Maren Harris RN

## 2020-07-09 NOTE — PROGRESS NOTES
"Maternal fetal Medicine OB Follow up visit.     Ruby Morrison  : 1985  MRN: [id number]    CC: OB Follow-up    Subjective:  uRby Morrison is a 34 year old  at 18w3d presenting for routine OB follow-up. Today, she is feeling very well. She does state around 3pm everyday she gets very tired and notices shortness of breath with taking stairs. She reports her BPs at home have all been normal with one exception, but repeat BP was normal.    She denies regular, painful contractions, denies loss of fluid or vaginal bleeding.  Reports fetal movement.      She also denies any recent fevers/chills, headaches or changes in vision, RUQ pain, nausea or vomiting, constipation, diarrhea or other systemic symptoms.      OB Hx:  OB History    Para Term  AB Living   3 2 2 0 0 2   SAB TAB Ectopic Multiple Live Births   0 0 0 0 2      # Outcome Date GA Lbr Serge/2nd Weight Sex Delivery Anes PTL Lv   3 Current            2 Term 18 38w1d  2.85 kg (6 lb 4.5 oz) F Vag-Spont EPI N GARY      Name: Cata \"Aishwarya\"      Apgar1: 8  Apgar5: 9   1 Term 16 37w0d / 03:50 2.551 kg (5 lb 10 oz) F Vag-Spont EPI  GARY      Name: Chino \"Gardenia\"      Apgar1: 2  Apgar5: 6       Objective:  /75 (BP Location: Left arm, Patient Position: Sitting, Cuff Size: Adult Large)   Pulse 90   Wt 91.4 kg (201 lb 8 oz)   SpO2 100%   BMI 39.35 kg/m    Gen: alert, NAD  Respiratory: breathing unlabored, no SOB  Abdominal:  gravid, fundus appropriate for tri/tri pregnancy.      OB Ultrasound:  Please see \"imaging\" tab under chart review for today's ultrasound results.      Assessment/Plan:  34 year old  at 18w3d here for follow OB visit.    Pregnancy has been complicated by:   -Triplet pregnancy s/p ovulation induction/IUI  -Chronic hypertension   -Glucose intolerance (A1C 5.8%) with a normal fasting glucose  -Class II obesity   -Mild intermittent asthma      #Routine PNC:  - Taking daily ASA  - Passed early GCT  - " Anatomy scan: done today. Reviewed EIF on fetus 1 and 3. Patient not interested in any form of genetic or invasive testing.  - Will re-draw preeclampsia labs for comparison studies.  -  fetal surveillance: Growth in 4wks  - Return OB visit in 4wks    10 minutes was spent face to face with the patient today discussing her history, diagnosis, and follow-up plan as noted above. Over 50% of the visit was spent in counseling and coordination of care.    Total Visit Time: 10 minutes.       Enriqueta Girard CNM on 2020 at 10:32 AM

## 2020-07-09 NOTE — PROGRESS NOTES
Please see the imaging tab for details of the ultrasound performed today.    Iveth Lang MD  Specialist in Maternal-Fetal Medicine

## 2020-08-06 ENCOUNTER — OFFICE VISIT (OUTPATIENT)
Dept: MATERNAL FETAL MEDICINE | Facility: CLINIC | Age: 35
End: 2020-08-06
Attending: ADVANCED PRACTICE MIDWIFE
Payer: COMMERCIAL

## 2020-08-06 ENCOUNTER — HOSPITAL ENCOUNTER (OUTPATIENT)
Dept: ULTRASOUND IMAGING | Facility: CLINIC | Age: 35
End: 2020-08-06
Attending: ADVANCED PRACTICE MIDWIFE
Payer: COMMERCIAL

## 2020-08-06 VITALS
SYSTOLIC BLOOD PRESSURE: 134 MMHG | HEART RATE: 100 BPM | OXYGEN SATURATION: 98 % | WEIGHT: 209.2 LBS | DIASTOLIC BLOOD PRESSURE: 70 MMHG | RESPIRATION RATE: 20 BRPM | BODY MASS INDEX: 40.86 KG/M2

## 2020-08-06 DIAGNOSIS — O30.101 TRIPLET GESTATION IN FIRST TRIMESTER, UNSPECIFIED MULTIPLE GESTATION TYPE: ICD-10-CM

## 2020-08-06 DIAGNOSIS — O09.91 SUPERVISION OF HIGH RISK PREGNANCY IN FIRST TRIMESTER: ICD-10-CM

## 2020-08-06 PROCEDURE — G0463 HOSPITAL OUTPT CLINIC VISIT: HCPCS | Mod: 25,ZF

## 2020-08-06 PROCEDURE — 76816 OB US FOLLOW-UP PER FETUS: CPT | Mod: 59

## 2020-08-06 ASSESSMENT — PAIN SCALES - GENERAL: PAINLEVEL: NO PAIN (0)

## 2020-08-06 NOTE — NURSING NOTE
Ruby here for f/u obv and f/u comp due to preg c/b tri/tri triplets. Pt reports +FM, denies ctx, denies sRoM, and denies vag bleeding.  Pt reports that she has been checking her BP at home and they are 107-117/70-75 at home. Growth was done today.  All babies have polyhydramnios today. Repeat GcT at 26-28 wk visit.Apurva COLES in to do obv. Pt previously scheduled for 24 and 25 weeks OBV. Pt left amb and stable and scheduled back for limited U/S and OBV at 24 and 25 week as per previous plan. Maria Elena Camacho RN

## 2020-08-06 NOTE — PROGRESS NOTES
"Maternal fetal Medicine OB Follow up visit.     Ruby Morrison  : 1985  MRN: [id number]    CC: OB Follow-up    Subjective:  Ruby Morrison is a 34 year old  at 22w3d presenting for routine OB follow-up. Today, she is feeling well overall. Does endorse musculoskeletal discomforts including pelvic pressure and fatigues more easily than before. 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    She denies regular, painful contractions, denies loss of fluid or vaginal bleeding.  Reports fetal movement.      She also denies any recent fevers/chills, headaches or changes in vision, RUQ pain, nausea or vomiting, constipation, diarrhea or other systemic symptoms.      OB Hx:  OB History    Para Term  AB Living   3 2 2 0 0 2   SAB TAB Ectopic Multiple Live Births   0 0 0 0 2      # Outcome Date GA Lbr Serge/2nd Weight Sex Delivery Anes PTL Lv   3 Current            2 Term 18 38w1d  2.85 kg (6 lb 4.5 oz) F Vag-Spont EPI N GARY      Name: Cata \"Aishwarya\"      Apgar1: 8  Apgar5: 9   1 Term 16 37w0d / 03:50 2.551 kg (5 lb 10 oz) F Vag-Spont EPI  GARY      Name: Chino \"Gardenia\"      Apgar1: 2  Apgar5: 6       Objective:  /70   Pulse 100   Resp 20   Wt 94.9 kg (209 lb 3.2 oz)   SpO2 98%   BMI 40.86 kg/m      Gen: alert, oriented, NAD  Respiratory: breathing unlabored, no SOB  Abdominal: gravid, non-tender  Extremities: WNL, no edema    OB Ultrasound:  Please see \"imaging\" tab under chart review for today's ultrasound results.      Assessment/Plan:  34 year old  at 22w3d here for follow OB visit.    Pregnancy has been complicated by:   -Triplet pregnancy s/p ovulation induction/IUI  -Chronic hypertension   -Glucose intolerance (A1C 5.8%) with a normal fasting glucose  -Class II obesity   -Mild intermittent asthma  - " Polyhydramnios of all 3 fetuses      #Routine PNC:  - Continued use of maternity support belt while active.   - Reviewed proper body mechanics and no lifting greater than 10-15lbs.   -  Recent HELLP labs WNL. Will not plan to re-draw at this time since since BP was only mildly elevated.  -  fetal surveillance: repeat growth US in 4 weeks; weekly BPPs at 32 weeks.  - Weekly OB visits starting at 24 weeks with limited US for each visit.    #Delivery planning:  - c/s at 35 weeks if clinically stable.      RTC in 2 weeks    10 minutes was spent face to face with the patient today discussing her history, diagnosis, and follow-up plan as noted above. Over 50% of the visit was spent in counseling and coordination of care.    Total Visit Time: 10 minutes.       Enriqueta Girard CNM on 2020 at 11:33 AM

## 2020-08-20 ENCOUNTER — OFFICE VISIT (OUTPATIENT)
Dept: MATERNAL FETAL MEDICINE | Facility: CLINIC | Age: 35
End: 2020-08-20
Attending: ADVANCED PRACTICE MIDWIFE
Payer: COMMERCIAL

## 2020-08-20 ENCOUNTER — HOSPITAL ENCOUNTER (OUTPATIENT)
Dept: ULTRASOUND IMAGING | Facility: CLINIC | Age: 35
End: 2020-08-20
Attending: ADVANCED PRACTICE MIDWIFE
Payer: COMMERCIAL

## 2020-08-20 VITALS
SYSTOLIC BLOOD PRESSURE: 122 MMHG | HEART RATE: 90 BPM | DIASTOLIC BLOOD PRESSURE: 78 MMHG | WEIGHT: 213 LBS | BODY MASS INDEX: 41.6 KG/M2

## 2020-08-20 DIAGNOSIS — O09.91 SUPERVISION OF HIGH RISK PREGNANCY IN FIRST TRIMESTER: ICD-10-CM

## 2020-08-20 DIAGNOSIS — O30.101 TRIPLET GESTATION IN FIRST TRIMESTER, UNSPECIFIED MULTIPLE GESTATION TYPE: Primary | ICD-10-CM

## 2020-08-20 DIAGNOSIS — O30.101 TRIPLET GESTATION IN FIRST TRIMESTER, UNSPECIFIED MULTIPLE GESTATION TYPE: ICD-10-CM

## 2020-08-20 DIAGNOSIS — O09.92 SUPERVISION OF HIGH RISK PREGNANCY IN SECOND TRIMESTER: Primary | ICD-10-CM

## 2020-08-20 PROCEDURE — 76815 OB US LIMITED FETUS(S): CPT

## 2020-08-20 PROCEDURE — G0463 HOSPITAL OUTPT CLINIC VISIT: HCPCS | Mod: 25,ZF

## 2020-08-20 NOTE — PROGRESS NOTES
Pt presents to [facility] for assessment and evaluation of her pregnancy due to triplets. Pt states she is doing well. Offers no complaints at this time. Pt states all 3 babies are moving. No contractions lof or bleeding. Pt was seen today by Enriqueta Girard for obv. See flow sheets and notes. Us done. Reviewed by Dr. Norton. See epic for today's results. Pt will start weekly appointments at this time. Will have a repeat gct in 2 weeks. Knows when to come in or call with further questions or concerns. Discharged stable at this time. Maren Harris RN

## 2020-08-20 NOTE — PROGRESS NOTES
"Maternal fetal Medicine OB Follow up visit.     Ruby Morrison  : 1985  MRN: [id number]    CC: OB Follow-up    Subjective:  Ruby Morrison is a 34 year old  at 24w3d presenting for routine OB follow-up. Today, she is feeling fine, but does report that she feels like she has \"turned the corner physically\" and now feels the increased discomforts of a triplet pregnancy. States she just finds herself needing to sit down more frequently and reports feeling heavy.     She has questions about when a NICU consult will occur. Additionally, she is wondering if cervical exams will begin because she reports that with her first pregnancy at her 36 week appointment she was found to be 4cm without signs of labor and subsequently went into labor 2 days later at 37w0d. Her second pregnancy she was not found to be dilated prematurely and went into spontaneous labor at 38w1d.    She denies regular, painful contractions, denies loss of fluid or vaginal bleeding.  Reports fetal movement.      She also denies any recent fevers/chills, headaches or changes in vision, RUQ pain, nausea or vomiting, constipation, diarrhea or other systemic symptoms.      OB Hx:  OB History    Para Term  AB Living   3 2 2 0 0 2   SAB TAB Ectopic Multiple Live Births   0 0 0 0 2      # Outcome Date GA Lbr Serge/2nd Weight Sex Delivery Anes PTL Lv   3 Current            2 Term 18 38w1d  2.85 kg (6 lb 4.5 oz) F Vag-Spont EPI N GARY      Name: Cata \"Aishwarya\"      Apgar1: 8  Apgar5: 9   1 Term 16 37w0d / 03:50 2.551 kg (5 lb 10 oz) F Vag-Spont EPI  GARY      Name: Chino \"Gardenia\"      Apgar1: 2  Apgar5: 6       Objective:  /78 (BP Location: Left arm, Patient Position: Sitting, Cuff Size: Adult Large)   Pulse 90   Wt 96.6 kg (213 lb)   BMI 41.60 kg/m    Gen: A&Ox3, NAD  Respiratory: breathing unlabored, no SOB.  Abdominal: gravid  Extremities: WNL    OB Ultrasound:  Please see \"imaging\" tab under chart review for " today's ultrasound results.    Assessment/Plan:  34 year old  at 24w3d here for follow OB visit.    Pregnancy has been complicated by:   - Tri/tri triplet pregnancy  - Hx of CHTN not on medications  - Class II obesity  - Mild intermittent asthma  - Mild Polyhydramnios of fetus 2. Resolved for fetus 1 and 3.    #Routine PNC:  - Will plan for GCT in 2 weeks. Will add on repeat HELLP labs at that time too. Sooner if clinically indicated.  - Will begin weekly OB visits to monitor for s/s of preeclampsia and PTL.  - NICU consult in the 3rd trimester  -  fetal surveillance: repeat growth US in 2 weeks. Weekly limited US for viability with each OB visit. Weekly BPPs at 32 weeks.  - Upon review of initial MFM consult, it was noted an ECG was recommended and ordered. Patient does not have any recollection of this and no orders appear to have been placed. Discussed with Dr. Norton and will plan to defer at this time since patient has not been on medications or long-standing diagnosis of CHTN. If HTN develops in the pregnancy, we will plan for ECG at that time.    10 minutes was spent face to face with the patient today discussing her history, diagnosis, and follow-up plan as noted above. Over 50% of the visit was spent in counseling and coordination of care.    Total Visit Time: 10 minutes.       Enriqueta Girard CNM on 2020 at 8:34 AM

## 2020-08-20 NOTE — PROGRESS NOTES
Please see full imaging report from [employer] program under imaging tab.    Mild polyhydramnios, triplet 2.     Deepak Norton MD  Maternal Fetal Medicine

## 2020-08-27 ENCOUNTER — OFFICE VISIT (OUTPATIENT)
Dept: MATERNAL FETAL MEDICINE | Facility: CLINIC | Age: 35
End: 2020-08-27
Attending: ADVANCED PRACTICE MIDWIFE
Payer: COMMERCIAL

## 2020-08-27 ENCOUNTER — HOSPITAL ENCOUNTER (OUTPATIENT)
Dept: ULTRASOUND IMAGING | Facility: CLINIC | Age: 35
End: 2020-08-27
Attending: ADVANCED PRACTICE MIDWIFE
Payer: COMMERCIAL

## 2020-08-27 VITALS
DIASTOLIC BLOOD PRESSURE: 83 MMHG | BODY MASS INDEX: 41.64 KG/M2 | SYSTOLIC BLOOD PRESSURE: 128 MMHG | HEART RATE: 103 BPM | OXYGEN SATURATION: 98 % | WEIGHT: 213.2 LBS | RESPIRATION RATE: 18 BRPM

## 2020-08-27 DIAGNOSIS — O09.92 SUPERVISION OF HIGH RISK PREGNANCY IN SECOND TRIMESTER: Primary | ICD-10-CM

## 2020-08-27 DIAGNOSIS — O30.101 TRIPLET GESTATION IN FIRST TRIMESTER, UNSPECIFIED MULTIPLE GESTATION TYPE: ICD-10-CM

## 2020-08-27 DIAGNOSIS — O30.132 TRICHORIONIC TRIAMNIOTIC TRIPLET PREGNANCY IN SECOND TRIMESTER: Primary | ICD-10-CM

## 2020-08-27 DIAGNOSIS — O30.132 TRICHORIONIC TRIAMNIOTIC TRIPLET PREGNANCY IN SECOND TRIMESTER: ICD-10-CM

## 2020-08-27 DIAGNOSIS — O09.91 SUPERVISION OF HIGH RISK PREGNANCY IN FIRST TRIMESTER: ICD-10-CM

## 2020-08-27 PROCEDURE — 76815 OB US LIMITED FETUS(S): CPT

## 2020-08-27 PROCEDURE — G0463 HOSPITAL OUTPT CLINIC VISIT: HCPCS | Mod: 25,ZF

## 2020-08-27 RX ORDER — POLYETHYLENE GLYCOL 3350 17 G/17G
1 POWDER, FOR SOLUTION ORAL DAILY
COMMUNITY
End: 2020-12-10

## 2020-08-27 RX ORDER — AMOXICILLIN 250 MG
1 CAPSULE ORAL DAILY
COMMUNITY
End: 2020-12-10

## 2020-08-27 SDOH — HEALTH STABILITY: MENTAL HEALTH: HOW OFTEN DO YOU HAVE A DRINK CONTAINING ALCOHOL?: NEVER

## 2020-08-27 ASSESSMENT — PAIN SCALES - GENERAL: PAINLEVEL: NO PAIN (0)

## 2020-08-27 NOTE — PROGRESS NOTES
"Maternal fetal Medicine OB Follow up visit.     Ruby Morrison  : 1985  MRN: [id number]    CC: OB Follow-up    Subjective:  Ruby Morrison is a 34 year old  at 25w3d presenting for routine OB follow-up. Today, she is feeling well, but states she feels \"large\". Reports she needs to make very slow movements when changing positions otherwise she feels sharp RL pain. Starting to have some leg cramps. She also states that she noticed a hemorrhoid recently. Denies pain, itching, or bleeding from the site. Patient states she does not want to take her GCT today as she had toast for breakfast.    She denies regular, painful contractions, denies loss of fluid or vaginal bleeding.  Reports fetal movement.      She also denies any recent fevers/chills, headaches or changes in vision, RUQ pain, nausea or vomiting, constipation, diarrhea or other systemic symptoms.      OB Hx:  OB History    Para Term  AB Living   3 2 2 0 0 2   SAB TAB Ectopic Multiple Live Births   0 0 0 0 2      # Outcome Date GA Lbr Serge/2nd Weight Sex Delivery Anes PTL Lv   3 Current            2 Term 18 38w1d  2.85 kg (6 lb 4.5 oz) F Vag-Spont EPI N GARY      Name: Cata \"Aishwarya\"      Apgar1: 8  Apgar5: 9   1 Term 16 37w0d / 03:50 2.551 kg (5 lb 10 oz) F Vag-Spont EPI  GARY      Name: Chino \"Gardenia\"      Apgar1: 2  Apgar5: 6         Objective:  /83 (BP Location: Left arm, Patient Position: Chair)   Pulse 103   Resp 18   Wt 96.7 kg (213 lb 3.2 oz)   SpO2 98%   BMI 41.64 kg/m      Gen: alert, oriented, NAD  Respiratory: breathing unlabored, no SOB  Abdominal: gravid  Extremities: WNL, no edema      OB Ultrasound:  Please see \"imaging\" tab under chart review for today's ultrasound results.      Assessment/Plan:  34 year old  at 25w3d here for follow OB visit.    Pregnancy has been complicated by:   - Tri/tri triplet pregnancy  - Hx of CHTN not on medications  - Class II obesity  - Mild intermittent " asthma  - Polyhydramnios of fetus 2.       #Routine PNC:  - Discussed that GCT does not need to be a fasting test, but patient still declines. Is agreeable to GCT and repeat HELLP labs with next visit. Orders placed.  - Discussed comfort measures for hemorrhoids and leg cramps.  - Due to patient history of advanced cervical dilation in first pregnancy at 36 weeks, will plan for a speculum exam with next visit to visualize cervix.  -  fetal surveillance: growth US next week and every 4 weeks; Weekly BPPs at 32 weeks.   - Weekly OB visits for the remainder of pregnancy.    10 minutes was spent face to face with the patient today discussing her history, diagnosis, and follow-up plan as noted above. Over 50% of the visit was spent in counseling and coordination of care.    Total Visit Time: 10 minutes.           Enriqueta Girard CNM on 2020 at 8:14 AM

## 2020-09-03 ENCOUNTER — OFFICE VISIT (OUTPATIENT)
Dept: MATERNAL FETAL MEDICINE | Facility: CLINIC | Age: 35
End: 2020-09-03
Attending: ADVANCED PRACTICE MIDWIFE
Payer: COMMERCIAL

## 2020-09-03 ENCOUNTER — HOSPITAL ENCOUNTER (OUTPATIENT)
Dept: ULTRASOUND IMAGING | Facility: CLINIC | Age: 35
End: 2020-09-03
Attending: ADVANCED PRACTICE MIDWIFE
Payer: COMMERCIAL

## 2020-09-03 VITALS
SYSTOLIC BLOOD PRESSURE: 114 MMHG | WEIGHT: 214.5 LBS | HEART RATE: 97 BPM | RESPIRATION RATE: 20 BRPM | BODY MASS INDEX: 41.89 KG/M2 | DIASTOLIC BLOOD PRESSURE: 62 MMHG | OXYGEN SATURATION: 99 %

## 2020-09-03 DIAGNOSIS — O16.2 HYPERTENSION AFFECTING PREGNANCY IN SECOND TRIMESTER: ICD-10-CM

## 2020-09-03 DIAGNOSIS — O30.101 TRIPLET GESTATION IN FIRST TRIMESTER, UNSPECIFIED MULTIPLE GESTATION TYPE: Primary | ICD-10-CM

## 2020-09-03 DIAGNOSIS — O09.92 SUPERVISION OF HIGH RISK PREGNANCY IN SECOND TRIMESTER: ICD-10-CM

## 2020-09-03 DIAGNOSIS — O30.132 TRICHORIONIC TRIAMNIOTIC TRIPLET PREGNANCY IN SECOND TRIMESTER: ICD-10-CM

## 2020-09-03 DIAGNOSIS — O09.92 SUPERVISION OF HIGH RISK PREGNANCY IN SECOND TRIMESTER: Primary | ICD-10-CM

## 2020-09-03 LAB
ERYTHROCYTE [DISTWIDTH] IN BLOOD BY AUTOMATED COUNT: 13.4 % (ref 10–15)
GLUCOSE 1H P 50 G GLC PO SERPL-MCNC: 120 MG/DL (ref 60–129)
HCT VFR BLD AUTO: 35.7 % (ref 35–47)
HGB BLD-MCNC: 11.3 G/DL (ref 11.7–15.7)
MCH RBC QN AUTO: 28 PG (ref 26.5–33)
MCHC RBC AUTO-ENTMCNC: 31.7 G/DL (ref 31.5–36.5)
MCV RBC AUTO: 89 FL (ref 78–100)
PLATELET # BLD AUTO: 305 10E9/L (ref 150–450)
RBC # BLD AUTO: 4.03 10E12/L (ref 3.8–5.2)
SPECIMEN SOURCE: NORMAL
T PALLIDUM AB SER QL: NONREACTIVE
WBC # BLD AUTO: 15.8 10E9/L (ref 4–11)
WET PREP SPEC: NORMAL

## 2020-09-03 PROCEDURE — 85027 COMPLETE CBC AUTOMATED: CPT | Performed by: ADVANCED PRACTICE MIDWIFE

## 2020-09-03 PROCEDURE — 87210 SMEAR WET MOUNT SALINE/INK: CPT | Performed by: ADVANCED PRACTICE MIDWIFE

## 2020-09-03 PROCEDURE — 76816 OB US FOLLOW-UP PER FETUS: CPT

## 2020-09-03 PROCEDURE — 36415 COLL VENOUS BLD VENIPUNCTURE: CPT | Performed by: ADVANCED PRACTICE MIDWIFE

## 2020-09-03 PROCEDURE — G0463 HOSPITAL OUTPT CLINIC VISIT: HCPCS | Mod: 25,ZF

## 2020-09-03 PROCEDURE — 82950 GLUCOSE TEST: CPT | Performed by: ADVANCED PRACTICE MIDWIFE

## 2020-09-03 PROCEDURE — 86780 TREPONEMA PALLIDUM: CPT | Performed by: ADVANCED PRACTICE MIDWIFE

## 2020-09-03 ASSESSMENT — PAIN SCALES - GENERAL: PAINLEVEL: NO PAIN (0)

## 2020-09-03 NOTE — PROGRESS NOTES
"Please see \"Imaging\" tab under \"Chart Review\" for details of today's US at the [facility].    Anand Arango MD  Maternal-Fetal Medicine      "

## 2020-09-03 NOTE — NURSING NOTE
Ruby here today for f/u comp and f/u obv of triplets. Pt reports +FM, denies ctx, denies SROM, and denies vag bleeding. Pt reports some increased clear discharge. On U/s done today transabdominal cervical measurement was 2.2 cm. Enriqueta SABILLONM in to do SVE and wet prep on pt. Pt had internal os cervix eval of FT/1 cm dilated. Pt will continue to monitor or ctx or PTL S/S of denies all at this time. Pt had GCT done today. Pt has apt next week for limited U/S and f/u obv. Pt will call for any further questions and/or concerns. Maria Elena Estes RN

## 2020-09-03 NOTE — PROGRESS NOTES
"Maternal fetal Medicine OB Follow up visit.     Ruby Morrison  : 1985  MRN: [id number]    CC: OB Follow-up    Subjective:  Ruby Morrison is a 34 year old  at 26w3d presenting for routine OB follow-up. Today, she is feeling well. Offers no complaints or concerns.    She denies regular, painful contractions, denies loss of fluid or vaginal bleeding.  Reports fetal movement of all 3 fetuses.      She also denies any recent fevers/chills, headaches or changes in vision, RUQ pain, nausea or vomiting, constipation, diarrhea or other systemic symptoms.      OB Hx:  OB History    Para Term  AB Living   3 2 2 0 0 2   SAB TAB Ectopic Multiple Live Births   0 0 0 0 2      # Outcome Date GA Lbr Serge/2nd Weight Sex Delivery Anes PTL Lv   3 Current            2 Term 18 38w1d  2.85 kg (6 lb 4.5 oz) F Vag-Spont EPI N GARY      Name: Cata \"Aishwarya\"      Apgar1: 8  Apgar5: 9   1 Term 16 37w0d / 03:50 2.551 kg (5 lb 10 oz) F Vag-Spont EPI  GARY      Name: Chino \"Gardenia\"      Apgar1: 2  Apgar5: 6     Objective:  /62   Pulse 97   Resp 20   Wt 97.3 kg (214 lb 8 oz)   SpO2 99%   BMI 41.89 kg/m      Gen: alert, oriented, NAD  Respiratory: breathing unlabored, no SOB  Abdominal: gravid, non-tender  Pelvic: External genitalia WNL; external os appears open on SSE. SVE revealed external os of 3cm, internal os 1cm/thick about 3-4cm in length/unable to palpate presenting part.  Extremities: WNL, no edema      OB Ultrasound:  Please see \"imaging\" tab under chart review for today's ultrasound results.      Assessment/Plan:  34 year old  at 26w3d here for follow OB visit.    Pregnancy has been complicated by:   - Tri/tri triplets  - CHTN  - Polyhydramnios of fetus 2  - Pyelectasis of fetus 3      #Routine PNC:  - GCT, RPR, and CBC today. No need to repeat HELLP labs since she is asymptomatic.  - Reviewed cervical exam with Dr. Arango. Since patient is not lyndsay, will plan to send " home with strict PTL precautions. Will not administer BMZ at this time. Ruby understands what symptoms to look for and knows when to seek medical attention.  - Instructed on pelvic rest and adequate rest during the day.  -  fetal surveillance: Growth in 3 weeks; Weekly BPPs at 32 weeks  - Return to clinic in 1 week.       15 minutes was spent face to face with the patient today discussing her history, diagnosis, and follow-up plan as noted above. Over 50% of the visit was spent in counseling and coordination of care.    Total Visit Time: 15 minutes.       Enriqueta Girard CNM on 9/3/2020 at 8:31 AM

## 2020-09-10 ENCOUNTER — OFFICE VISIT (OUTPATIENT)
Dept: MATERNAL FETAL MEDICINE | Facility: CLINIC | Age: 35
End: 2020-09-10
Attending: ADVANCED PRACTICE MIDWIFE
Payer: COMMERCIAL

## 2020-09-10 ENCOUNTER — HOSPITAL ENCOUNTER (OUTPATIENT)
Dept: ULTRASOUND IMAGING | Facility: CLINIC | Age: 35
End: 2020-09-10
Attending: ADVANCED PRACTICE MIDWIFE
Payer: COMMERCIAL

## 2020-09-10 VITALS
OXYGEN SATURATION: 98 % | BODY MASS INDEX: 42.24 KG/M2 | HEART RATE: 100 BPM | WEIGHT: 216.3 LBS | SYSTOLIC BLOOD PRESSURE: 133 MMHG | DIASTOLIC BLOOD PRESSURE: 75 MMHG

## 2020-09-10 DIAGNOSIS — O30.132 TRICHORIONIC TRIAMNIOTIC TRIPLET PREGNANCY IN SECOND TRIMESTER: ICD-10-CM

## 2020-09-10 DIAGNOSIS — O09.92 SUPERVISION OF HIGH RISK PREGNANCY IN SECOND TRIMESTER: Primary | ICD-10-CM

## 2020-09-10 DIAGNOSIS — O30.132 TRICHORIONIC TRIAMNIOTIC TRIPLET PREGNANCY IN SECOND TRIMESTER: Primary | ICD-10-CM

## 2020-09-10 PROCEDURE — 25000128 H RX IP 250 OP 636: Mod: ZF

## 2020-09-10 PROCEDURE — 90715 TDAP VACCINE 7 YRS/> IM: CPT | Mod: ZF

## 2020-09-10 PROCEDURE — G0463 HOSPITAL OUTPT CLINIC VISIT: HCPCS | Mod: 25,ZF

## 2020-09-10 PROCEDURE — 90471 IMMUNIZATION ADMIN: CPT | Mod: ZF

## 2020-09-10 PROCEDURE — 76815 OB US LIMITED FETUS(S): CPT

## 2020-09-10 ASSESSMENT — PAIN SCALES - GENERAL: PAINLEVEL: NO PAIN (0)

## 2020-09-10 NOTE — NURSING NOTE
Ruby here today for f/u obv and limited U/S of tri/tri triplets. Pt reports +FM, denies ctx, denies sRoM, and denies vag bleeding.  Pt feeling well overall. Pt received TDAP vaccine today. Enriqueta Girard in to talk with pt. Pt will come back next week for limited U/S and will have [facility] MD visit at on of the next few visits. To discuss delivery plan. Pt left amb and stable. Maria Elena Estes RN

## 2020-09-10 NOTE — PROGRESS NOTES
"Maternal fetal Medicine OB Follow up visit.     Ruby Morrison  : 1985  MRN: [id number]    CC: OB Follow-up    Subjective:  Ruby Morrison is a 34 year old  at 27w3d presenting for routine OB follow-up. Today, she is feeling well physically, but starting to have some anxiety pertaining to her delivery and logistics of having triplets. She denies regular, painful contractions, denies loss of fluid or vaginal bleeding.  Reports fetal movement.     Has questions about what to expect with  delivery. Is worried about adequate pain relief because the epidurals she received in the past only partially worked for her.     OB Hx:  OB History    Para Term  AB Living   3 2 2 0 0 2   SAB TAB Ectopic Multiple Live Births   0 0 0 0 2      # Outcome Date GA Lbr Serge/2nd Weight Sex Delivery Anes PTL Lv   3 Current            2 Term 18 38w1d  2.85 kg (6 lb 4.5 oz) F Vag-Spont EPI N GARY      Name: Cata \"Aishwarya\"      Apgar1: 8  Apgar5: 9   1 Term 16 37w0d / 03:50 2.551 kg (5 lb 10 oz) F Vag-Spont EPI  GARY      Name: Chino \"Gardenia\"      Apgar1: 2  Apgar5: 6       Objective:  /75   Pulse 100   Wt 98.1 kg (216 lb 4.8 oz)   SpO2 98%   BMI 42.24 kg/m      Gen: alert, oriented, NAD  Respiratory: breathing unlabored, no SOB  Abdominal: gravid  Extremities: WNL      OB Ultrasound:  Please see \"imaging\" tab under chart review for today's ultrasound results.      Assessment/Plan:  34 year old  at 27w3d here for follow OB visit.    Pregnancy has been complicated by:   - Tri/tri triplets  - CHTN  - Polyhydramnios of fetus 1, resolved for fetus 2  - Pyelectasis of fetus 3      #Routine PNC:  - Discussed that her FMLA can be taken in chunks of time off rather than all at once. 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  - Plan for NICU consult around 30 weeks.   - Reviewed " s/s of PTL  -Immunizations:  s/p TDap 9/10  - GBS to be collected between 30-31wks, sooner if clinically indicated.   - Contraception plans: Briefly reviewed PPTL if patient is certain she is done child-bearing. Patient states IUI has been needed for all of her pregnancies so she has not used birth control in the past for contraceptive reasons. Has had the Mirena IUD to help manage AUB and might consider that again.    # fetal surveillance:   - Growth US q 3-4wks, next on 10/1  - Weekly BPPs at 32 weeks.    #Delivery planning:  - c/s between 35-36 weeks if stable. Will need one of her upcoming visits with MD to discuss delivery planning.   - Discussed c/s process with patient and reviewed use of spinal anesthesia vs epidural.   - Reviewed typical length of stay in hospital after CD is 72 hours.    RTC in 1 week    15 minutes was spent face to face with the patient today discussing her history, diagnosis, and follow-up plan as noted above. Over 50% of the visit was spent in counseling and coordination of care.    Total Visit Time: 15 minutes.       Enriqueta Girard CNM on 9/10/2020 at 9:25 AM

## 2020-09-17 ENCOUNTER — HOSPITAL ENCOUNTER (OUTPATIENT)
Dept: ULTRASOUND IMAGING | Facility: CLINIC | Age: 35
End: 2020-09-17
Attending: ADVANCED PRACTICE MIDWIFE
Payer: COMMERCIAL

## 2020-09-17 ENCOUNTER — OFFICE VISIT (OUTPATIENT)
Dept: MATERNAL FETAL MEDICINE | Facility: CLINIC | Age: 35
End: 2020-09-17
Attending: ADVANCED PRACTICE MIDWIFE
Payer: COMMERCIAL

## 2020-09-17 VITALS
SYSTOLIC BLOOD PRESSURE: 146 MMHG | HEART RATE: 98 BPM | BODY MASS INDEX: 42.56 KG/M2 | DIASTOLIC BLOOD PRESSURE: 85 MMHG | WEIGHT: 217.9 LBS | OXYGEN SATURATION: 100 %

## 2020-09-17 DIAGNOSIS — O09.93 SUPERVISION OF HIGH RISK PREGNANCY IN THIRD TRIMESTER: Primary | ICD-10-CM

## 2020-09-17 DIAGNOSIS — O30.133 TRICHORIONIC TRIAMNIOTIC TRIPLET PREGNANCY IN THIRD TRIMESTER: Primary | ICD-10-CM

## 2020-09-17 DIAGNOSIS — O10.912 CHRONIC HYPERTENSION COMPLICATING OR REASON FOR CARE DURING PREGNANCY, SECOND TRIMESTER: ICD-10-CM

## 2020-09-17 DIAGNOSIS — O30.132 TRICHORIONIC TRIAMNIOTIC TRIPLET PREGNANCY IN SECOND TRIMESTER: ICD-10-CM

## 2020-09-17 DIAGNOSIS — O40.3XX2 POLYHYDRAMNIOS IN THIRD TRIMESTER COMPLICATION, FETUS 2 OF MULTIPLE GESTATION: ICD-10-CM

## 2020-09-17 LAB
ALBUMIN SERPL-MCNC: 2.4 G/DL (ref 3.4–5)
ALP SERPL-CCNC: 112 U/L (ref 40–150)
ALT SERPL W P-5'-P-CCNC: 24 U/L (ref 0–50)
ANION GAP SERPL CALCULATED.3IONS-SCNC: 7 MMOL/L (ref 3–14)
AST SERPL W P-5'-P-CCNC: 20 U/L (ref 0–45)
BILIRUB SERPL-MCNC: 0.3 MG/DL (ref 0.2–1.3)
BUN SERPL-MCNC: 8 MG/DL (ref 7–30)
CALCIUM SERPL-MCNC: 9 MG/DL (ref 8.5–10.1)
CHLORIDE SERPL-SCNC: 105 MMOL/L (ref 94–109)
CO2 SERPL-SCNC: 25 MMOL/L (ref 20–32)
CREAT SERPL-MCNC: 0.45 MG/DL (ref 0.52–1.04)
CREAT UR-MCNC: 47 MG/DL
ERYTHROCYTE [DISTWIDTH] IN BLOOD BY AUTOMATED COUNT: 13.9 % (ref 10–15)
GFR SERPL CREATININE-BSD FRML MDRD: >90 ML/MIN/{1.73_M2}
GLUCOSE SERPL-MCNC: 89 MG/DL (ref 70–99)
HCT VFR BLD AUTO: 36.2 % (ref 35–47)
HGB BLD-MCNC: 11.3 G/DL (ref 11.7–15.7)
MCH RBC QN AUTO: 26.8 PG (ref 26.5–33)
MCHC RBC AUTO-ENTMCNC: 31.2 G/DL (ref 31.5–36.5)
MCV RBC AUTO: 86 FL (ref 78–100)
PLATELET # BLD AUTO: 273 10E9/L (ref 150–450)
POTASSIUM SERPL-SCNC: 3.9 MMOL/L (ref 3.4–5.3)
PROT SERPL-MCNC: 7 G/DL (ref 6.8–8.8)
PROT UR-MCNC: 0.08 G/L
PROT/CREAT 24H UR: 0.18 G/G CR (ref 0–0.2)
RBC # BLD AUTO: 4.21 10E12/L (ref 3.8–5.2)
SODIUM SERPL-SCNC: 137 MMOL/L (ref 133–144)
WBC # BLD AUTO: 15.2 10E9/L (ref 4–11)

## 2020-09-17 PROCEDURE — 85027 COMPLETE CBC AUTOMATED: CPT | Performed by: OBSTETRICS & GYNECOLOGY

## 2020-09-17 PROCEDURE — G0463 HOSPITAL OUTPT CLINIC VISIT: HCPCS | Mod: 25,ZF

## 2020-09-17 PROCEDURE — 36415 COLL VENOUS BLD VENIPUNCTURE: CPT | Performed by: OBSTETRICS & GYNECOLOGY

## 2020-09-17 PROCEDURE — 80053 COMPREHEN METABOLIC PANEL: CPT | Performed by: OBSTETRICS & GYNECOLOGY

## 2020-09-17 PROCEDURE — 76815 OB US LIMITED FETUS(S): CPT

## 2020-09-17 PROCEDURE — 84156 ASSAY OF PROTEIN URINE: CPT | Performed by: OBSTETRICS & GYNECOLOGY

## 2020-09-17 ASSESSMENT — PAIN SCALES - GENERAL: PAINLEVEL: NO PAIN (0)

## 2020-09-17 NOTE — NURSING NOTE
Ruby here today for limited U/s with f/u obv due to preg c/b tri/tri triplets. Pt reports +FM, denies ctx, denies sRoM, and denies vag bleeding. Pt reports some pedal edema today-2+ which is new in the past few days. Pt denies headache, blurred vision, and any other s/s of preeclampsia. Pt scheduled apts for weekly OBV and needs weekly Bpp's. Dr Romero and Dr. Cordova in to see pt. Pt escorted to lab by escort for preeclampsia labs. Pt discussed C/s at 35 weeks so week of November 2nd, but will wait to schedule this. Pt left amb and stable. Maria Elena Estes RN

## 2020-09-17 NOTE — LETTER
2020      To whom it may concern:      Ruby Morrison,  1985 is pregnant with triplets. Due to triplet gestation, we recommend the following work accommodations for Ruby:    1. No lifting greater than 10 pounds.  2. 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 Able to sit as needed throughout the course of her work day.      Thank you,            Dr. Holley Cordova, Maternal Fetal Medicine Specialist

## 2020-09-17 NOTE — PROGRESS NOTES
"Maternal fetal Medicine OB Follow up visit.     Ruby Morrison  : 1985  MRN: [id number]    CC: OB Follow-up    Subjective:  Ruby Morrison is a 34 year old  with Tri/Tri triplet pregnancy at 28w3d presenting for routine OB follow-up.     Today, she is feeling well physically, but starting to have some anxiety pertaining to her delivery and logistics of having triplets. She denies regular, painful contractions, denies loss of fluid or vaginal bleeding.  Reports fetal movement.     Has questions about what to expect with  delivery. Is worried about adequate pain relief because the epidurals she received in the past only partially worked for her.     Pregnancy complicated by:  -Triplet pregnancy s/p ovulation induction/IUI  -Chronic hypertension   -Glucose intolerance (A1C 5.8%) with a normal glucose testing in pregnancy  -Class II obesity   -Mild intermittent asthma  -Mild polyhydramnios of Fetus 2  -Mild pylectasis of Fetus 3    OB Hx:  OB History    Para Term  AB Living   3 2 2 0 0 2   SAB TAB Ectopic Multiple Live Births   0 0 0 0 2      # Outcome Date GA Lbr Serge/2nd Weight Sex Delivery Anes PTL Lv   3 Current            2 Term 18 38w1d  2.85 kg (6 lb 4.5 oz) F Vag-Spont EPI N GARY      Name: Cata \"Aishwarya\"      Apgar1: 8  Apgar5: 9   1 Term 16 37w0d / 03:50 2.551 kg (5 lb 10 oz) F Vag-Spont EPI  GARY      Name: Chino \"Gardenia\"      Apgar1: 2  Apgar5: 6       Objective:  BP (!) 146/85   Pulse 98   Wt 98.8 kg (217 lb 14.4 oz)   SpO2 100%   BMI 42.56 kg/m      Gen: alert, oriented, NAD  Respiratory: breathing unlabored, no SOB  Abdominal: gravid  Extremities: WNL    TWlbs  BMI 42    OB Ultrasound:   Comprehensive US 9/3/20:  IMPRESSION  ---------------------------------------------------------------------------------------------------------  For fetus 1: Growth parameters and estimated fetal weight were consistent with appropriate for gestational age pattern " "of growth. Fetal anatomy appeared normal for  gestational age. Normal DAJA. Transverse, head maternal right, presenting. MVP 6.7cm. EFW 56%     For fetus 2: Growth parameters and estimated fetal weight were consistent with appropriate for gestational age pattern of growth. Fetal anatomy appeared normal for  gestational age. Mild polyhydramnios MVP 9.2cm. Cephalic, maternal right. EFW 60%     For fetus 3: Growth parameters and estimated fetal weight were consistent with appropriate for gestational age pattern of growth. Mild right pyelectasis (UTD A1) is seen.  Fetal anatomy appeared otherwise normal for gestational age. Normal DAJA. Breech, maternal left, MVP 7.2cm. EFW 64%     There is a 1% discordance between fetus 1 and 3. Cervical length 2.19cm, suboptimal view    Please see \"Imaging\" tab under \"Chart Review\" for details of today's visit.    Assessment/Plan:  34 year old  with Tri/Tri triplet pregnancy at 28w3d here for follow OB visit..     #Routine PNC:  #Triplet pregnancy  - Plan for NICU consult around 30 weeks.   - Reviewed s/s of PTL.  Advised immediate presentation to L&D if any signs/symptoms of PTL.  - Plan delivery via CS.    -Immunizations:  s/p TDap 9/10, Flu vaccine next visit  - GBS to be collected between 30-31wks, sooner if clinically indicated.   - Contraception plans: Briefly reviewed PPTL if patient is certain she is done child-bearing. Patient states IUI has been needed for all of her pregnancies so she has not used birth control in the past for contraceptive reasons. Has had the Mirena IUD to help manage AUB and might consider that again.    #Chronic HTN  - Baseline pre-eclampsia labs last  wnl, UPC 0.13  - BP today mild range, denies symptoms  - On low dose ASA  - No medications     #Mild asthma  #Obesity    #Glucose intolerance  - Hgb A1c 5.8   -  9/3    # fetal surveillance:   #Mild polyhdyramnios fetus 1 and 2  #Mild fetal pyelectasis  - Growth US q 3-4wks, next " on 10/1  - Weekly BPPs starting next week  - Pylectasis fetus 3 9/3 with no dilation of calyces, UTD A1: low risk    #Delivery planning:  - c/s at 36 weeks if stable.   - Discussed c/s process with patient and reviewed use of spinal anesthesia vs epidural   - Reviewed typical length of stay in hospital after CD is 72 hours.    RTC in 1 week    Discussed with Dr. Art Romero MD PGY2  Obstetrics & Gynecology  09/16/20     Physician Attestation   I, Holley Cordova MD, saw this patient and agree with the findings and plan of care as documented in the note.      Items personally reviewed/procedural attestation: imaging and agree with the interpretation documented in the note.    Holley Cordova MD     I spent a total of 10 minutes face-to-face with Ruby Morrison during today's office visit.  Over 50% of this time was spent counseling the patient and/or coordinating care regarding pregnancy complicated by triplet (tri-tri) gestation.  See note for details.

## 2020-09-24 ENCOUNTER — HOSPITAL ENCOUNTER (OUTPATIENT)
Dept: ULTRASOUND IMAGING | Facility: CLINIC | Age: 35
End: 2020-09-24
Attending: ADVANCED PRACTICE MIDWIFE
Payer: COMMERCIAL

## 2020-09-24 ENCOUNTER — OFFICE VISIT (OUTPATIENT)
Dept: MATERNAL FETAL MEDICINE | Facility: CLINIC | Age: 35
End: 2020-09-24
Attending: ADVANCED PRACTICE MIDWIFE
Payer: COMMERCIAL

## 2020-09-24 VITALS
OXYGEN SATURATION: 98 % | BODY MASS INDEX: 43.18 KG/M2 | SYSTOLIC BLOOD PRESSURE: 128 MMHG | WEIGHT: 221.1 LBS | DIASTOLIC BLOOD PRESSURE: 81 MMHG

## 2020-09-24 DIAGNOSIS — O30.133 TRICHORIONIC TRIAMNIOTIC TRIPLET PREGNANCY IN THIRD TRIMESTER: Primary | ICD-10-CM

## 2020-09-24 DIAGNOSIS — O30.132 TRICHORIONIC TRIAMNIOTIC TRIPLET PREGNANCY IN SECOND TRIMESTER: ICD-10-CM

## 2020-09-24 PROCEDURE — G0008 ADMIN INFLUENZA VIRUS VAC: HCPCS | Mod: ZF

## 2020-09-24 PROCEDURE — 25000128 H RX IP 250 OP 636: Mod: ZF

## 2020-09-24 PROCEDURE — 90686 IIV4 VACC NO PRSV 0.5 ML IM: CPT | Mod: ZF

## 2020-09-24 PROCEDURE — G0463 HOSPITAL OUTPT CLINIC VISIT: HCPCS | Mod: 25,ZF

## 2020-09-24 PROCEDURE — 76819 FETAL BIOPHYS PROFIL W/O NST: CPT

## 2020-09-24 NOTE — PROGRESS NOTES
"Pt presents to [facility] for assessment and evaluation of her pregnancy due to triplets. Pt states she is doing well at this time. Pt has some vauge complaints re ligament pain. States +fm, no lof or bleeding. No contractions to report. Pt had bpp today. See epic for today's findings. Pt was seen by Dr. Ring. See [facility] for obv. Pt given flu shot today without issues. Pt questioning NICU consult. Requesing note for work to not work nights. Has follow up appoitnemtns set. Knows when to come in or call with concenrs. Discharged stable at this time. Maren Harris RN    Injectable Influenza Immunization Documentation    1.  Has the patient received the information for the injectable influenza vaccine? NO     2. Is the patient 6 months of age or older? NO     3. Does the patient have any of the following contraindications?         Severe allergy to eggs? No     Severe allergic reaction to previous influenza vaccines? No   Severe allergy to latex? No       History of Guillain-Columbus syndrome? No     Currently have a temperature greater than 100.4F? No        4.  Severely egg allergic patients should have flu vaccine eligibility assessed by an MD, RN, or pharmacist, and those who received flu vaccine should be observed for 15 min by an MD, RN, Pharmacist, Medical Technician, or member of clinic staff.\": YES    5. Latex-allergic patients should be given latex-free influenza vaccine Yes. Please reference the Vaccine latex table to determine if your clinic s product is latex-containing.       Vaccination given by Maren Harris RN        "

## 2020-09-24 NOTE — LETTER
[facility]  [address]  [facility] [address]  [phone]  Dept: [phone]      9/24/2020    Re: Ruby Morrison      TO WHOM IT MAY CONCERN:    Ruby Morrison  was seen on 9/24/20.  Please excuse her from night shifts for the duration of her triplet pregnancy    Cordially          Rima Ring MD  [facility]

## 2020-09-24 NOTE — PROGRESS NOTES
"Maternal fetal Medicine OB Follow up visit.     Ruby Morrison  : 1985  MRN: [id number]    CC: OB Follow-up    Subjective:  Ruby Morrison is a 34 year old  at 29w3d presenting for routine OB follow-up. Feeling well overall, does report some increased feelings of pelvic pressure, no leakage of fluid, vaginal bleeding, Feeling fetal movement.  No RUQ pain, headache, changes in vision.  Feeling some heartburn but feels she's doing ok with occassional Tums.    OB Hx:  OB History    Para Term  AB Living   3 2 2 0 0 2   SAB TAB Ectopic Multiple Live Births   0 0 0 0 2      # Outcome Date GA Lbr Serge/2nd Weight Sex Delivery Anes PTL Lv   3 Current            2 Term 18 38w1d  2.85 kg (6 lb 4.5 oz) F Vag-Spont EPI N GARY      Name: Cata \"Aishwarya\"      Apgar1: 8  Apgar5: 9   1 Term 16 37w0d / 03:50 2.551 kg (5 lb 10 oz) F Vag-Spont EPI  GARY      Name: Chino \"Gardenia\"      Apgar1: 2  Apgar5: 6       Objective:  /81 (BP Location: Left arm, Patient Position: Sitting, Cuff Size: Adult Large)   Wt 100.3 kg (221 lb 1.6 oz)   SpO2 98%   BMI 43.18 kg/m      Gen: alert, oriented, NAD  Respiratory: breathing unlabored, no SOB  Abdominal: gravid  Extremities: WNL      OB Ultrasound:  Please see \"imaging\" tab under chart review for today's ultrasound results.      Assessment/Plan:  34 year old  at 29w3d here for follow OB visit.    Pregnancy has been complicated by:   - Tri/tri triplets  - CHTN  - Polyhydramnios of fetus 1   - Pyelectasis of fetus 3    #Chronic hypertension  -no meds  -initial blood pressure mildly elevated today, repeat normal  -no signs or symptoms of preeclampsia    #Routine PNC:  - Working on scheduling NICU consult   - Reviewed s/s of PTL  -Immunizations:  s/p TDap 9/10, s/p Flu shot today  - GBS to be collected between 30-31wks, sooner if clinically indicated.   - Contraception plans: still considering    # fetal surveillance:   - Growth US q " 3-4wks, next on 10/1  - Weekly BPPs at 32 weeks.    #Delivery planning:  - c/s between 35-36 weeks if stable.      RTC in 1 week    Rima Ring MD  Maternal Fetal Medicine    10 minutes was spent face to face with the patient today discussing her history, diagnosis, and follow-up plan as noted above. Over 50% of the visit was spent in counseling and coordination of care.

## 2020-10-01 ENCOUNTER — HOSPITAL ENCOUNTER (INPATIENT)
Facility: CLINIC | Age: 35
LOS: 4 days | Discharge: HOME | End: 2020-10-05
Attending: OBSTETRICS & GYNECOLOGY | Admitting: OBSTETRICS & GYNECOLOGY
Payer: COMMERCIAL

## 2020-10-01 ENCOUNTER — OFFICE VISIT (OUTPATIENT)
Dept: MATERNAL FETAL MEDICINE | Facility: CLINIC | Age: 35
End: 2020-10-01
Attending: ADVANCED PRACTICE MIDWIFE
Payer: COMMERCIAL

## 2020-10-01 ENCOUNTER — ANESTHESIA EVENT (OUTPATIENT)
Dept: OBGYN | Facility: CLINIC | Age: 35
End: 2020-10-01
Payer: COMMERCIAL

## 2020-10-01 ENCOUNTER — ANESTHESIA (OUTPATIENT)
Dept: OBGYN | Facility: CLINIC | Age: 35
End: 2020-10-01
Payer: COMMERCIAL

## 2020-10-01 ENCOUNTER — HOSPITAL ENCOUNTER (OUTPATIENT)
Dept: ULTRASOUND IMAGING | Facility: CLINIC | Age: 35
End: 2020-10-01
Attending: ADVANCED PRACTICE MIDWIFE
Payer: COMMERCIAL

## 2020-10-01 VITALS
RESPIRATION RATE: 20 BRPM | HEART RATE: 108 BPM | OXYGEN SATURATION: 99 % | BODY MASS INDEX: 43.2 KG/M2 | WEIGHT: 221.2 LBS | DIASTOLIC BLOOD PRESSURE: 89 MMHG | SYSTOLIC BLOOD PRESSURE: 139 MMHG

## 2020-10-01 DIAGNOSIS — Z98.891 S/P CESAREAN SECTION: Primary | ICD-10-CM

## 2020-10-01 DIAGNOSIS — O30.133 TRICHORIONIC TRIAMNIOTIC TRIPLET PREGNANCY IN THIRD TRIMESTER: ICD-10-CM

## 2020-10-01 DIAGNOSIS — O30.132 TRICHORIONIC TRIAMNIOTIC TRIPLET PREGNANCY IN SECOND TRIMESTER: ICD-10-CM

## 2020-10-01 DIAGNOSIS — O30.133 TRICHORIONIC TRIAMNIOTIC TRIPLET PREGNANCY IN THIRD TRIMESTER: Primary | ICD-10-CM

## 2020-10-01 PROBLEM — O30.139: Status: ACTIVE | Noted: 2020-10-01

## 2020-10-01 LAB
ABO + RH BLD: NORMAL
ABO + RH BLD: NORMAL
ALT SERPL W P-5'-P-CCNC: 25 U/L (ref 0–50)
AST SERPL W P-5'-P-CCNC: 23 U/L (ref 0–45)
BASOPHILS # BLD AUTO: 0 10E9/L (ref 0–0.2)
BASOPHILS NFR BLD AUTO: 0.3 %
BLD GP AB SCN SERPL QL: NORMAL
BLD PROD TYP BPU: NORMAL
BLOOD BANK CMNT PATIENT-IMP: NORMAL
CREAT SERPL-MCNC: 0.42 MG/DL (ref 0.52–1.04)
CREAT UR-MCNC: 55 MG/DL
DIFFERENTIAL METHOD BLD: ABNORMAL
EOSINOPHIL # BLD AUTO: 0.3 10E9/L (ref 0–0.7)
EOSINOPHIL NFR BLD AUTO: 2.2 %
ERYTHROCYTE [DISTWIDTH] IN BLOOD BY AUTOMATED COUNT: 14.6 % (ref 10–15)
ERYTHROCYTE [DISTWIDTH] IN BLOOD BY AUTOMATED COUNT: 14.7 % (ref 10–15)
GFR SERPL CREATININE-BSD FRML MDRD: >90 ML/MIN/{1.73_M2}
GLUCOSE BLDC GLUCOMTR-MCNC: 116 MG/DL (ref 70–99)
HCT VFR BLD AUTO: 28.5 % (ref 35–47)
HCT VFR BLD AUTO: 31.8 % (ref 35–47)
HGB BLD-MCNC: 8.8 G/DL (ref 11.7–15.7)
HGB BLD-MCNC: 9.9 G/DL (ref 11.7–15.7)
IMM GRANULOCYTES # BLD: 0.2 10E9/L (ref 0–0.4)
IMM GRANULOCYTES NFR BLD: 1.2 %
LABORATORY COMMENT REPORT: NORMAL
LYMPHOCYTES # BLD AUTO: 2 10E9/L (ref 0.8–5.3)
LYMPHOCYTES NFR BLD AUTO: 15.4 %
MCH RBC QN AUTO: 25.4 PG (ref 26.5–33)
MCH RBC QN AUTO: 25.5 PG (ref 26.5–33)
MCHC RBC AUTO-ENTMCNC: 30.9 G/DL (ref 31.5–36.5)
MCHC RBC AUTO-ENTMCNC: 31.1 G/DL (ref 31.5–36.5)
MCV RBC AUTO: 82 FL (ref 78–100)
MCV RBC AUTO: 83 FL (ref 78–100)
MONOCYTES # BLD AUTO: 0.8 10E9/L (ref 0–1.3)
MONOCYTES NFR BLD AUTO: 6.1 %
NEUTROPHILS # BLD AUTO: 9.7 10E9/L (ref 1.6–8.3)
NEUTROPHILS NFR BLD AUTO: 74.8 %
NRBC # BLD AUTO: 0.1 10*3/UL
NRBC BLD AUTO-RTO: 1 /100
NUM BPU REQUESTED: 2
PLATELET # BLD AUTO: 212 10E9/L (ref 150–450)
PLATELET # BLD AUTO: 243 10E9/L (ref 150–450)
PROT UR-MCNC: 0.29 G/L
PROT/CREAT 24H UR: 0.53 G/G CR (ref 0–0.2)
RBC # BLD AUTO: 3.45 10E12/L (ref 3.8–5.2)
RBC # BLD AUTO: 3.9 10E12/L (ref 3.8–5.2)
SARS-COV-2 RNA SPEC QL NAA+PROBE: NEGATIVE
SARS-COV-2 RNA SPEC QL NAA+PROBE: NORMAL
SPECIMEN EXP DATE BLD: NORMAL
SPECIMEN SOURCE: NORMAL
SPECIMEN SOURCE: NORMAL
WBC # BLD AUTO: 12.9 10E9/L (ref 4–11)
WBC # BLD AUTO: 19.4 10E9/L (ref 4–11)

## 2020-10-01 PROCEDURE — 250N000013 HC RX MED GY IP 250 OP 250 PS 637: Performed by: STUDENT IN AN ORGANIZED HEALTH CARE EDUCATION/TRAINING PROGRAM

## 2020-10-01 PROCEDURE — U0003 INFECTIOUS AGENT DETECTION BY NUCLEIC ACID (DNA OR RNA); SEVERE ACUTE RESPIRATORY SYNDROME CORONAVIRUS 2 (SARS-COV-2) (CORONAVIRUS DISEASE [COVID-19]), AMPLIFIED PROBE TECHNIQUE, MAKING USE OF HIGH THROUGHPUT TECHNOLOGIES AS DESCRIBED BY CMS-2020-01-R: HCPCS | Performed by: STUDENT IN AN ORGANIZED HEALTH CARE EDUCATION/TRAINING PROGRAM

## 2020-10-01 PROCEDURE — 84450 TRANSFERASE (AST) (SGOT): CPT | Performed by: STUDENT IN AN ORGANIZED HEALTH CARE EDUCATION/TRAINING PROGRAM

## 2020-10-01 PROCEDURE — 120N000002 HC R&B MED SURG/OB UMMC

## 2020-10-01 PROCEDURE — 99212 OFFICE O/P EST SF 10 MIN: CPT | Mod: 25 | Performed by: ADVANCED PRACTICE MIDWIFE

## 2020-10-01 PROCEDURE — 84460 ALANINE AMINO (ALT) (SGPT): CPT | Performed by: STUDENT IN AN ORGANIZED HEALTH CARE EDUCATION/TRAINING PROGRAM

## 2020-10-01 PROCEDURE — G0463 HOSPITAL OUTPT CLINIC VISIT: HCPCS

## 2020-10-01 PROCEDURE — 86850 RBC ANTIBODY SCREEN: CPT | Performed by: STUDENT IN AN ORGANIZED HEALTH CARE EDUCATION/TRAINING PROGRAM

## 2020-10-01 PROCEDURE — 84156 ASSAY OF PROTEIN URINE: CPT | Performed by: STUDENT IN AN ORGANIZED HEALTH CARE EDUCATION/TRAINING PROGRAM

## 2020-10-01 PROCEDURE — 258N000003 HC RX IP 258 OP 636: Performed by: STUDENT IN AN ORGANIZED HEALTH CARE EDUCATION/TRAINING PROGRAM

## 2020-10-01 PROCEDURE — 370N000002 HC ANESTHESIA TECHNICAL FEE, EACH ADDTL 15 MIN: Performed by: OBSTETRICS & GYNECOLOGY

## 2020-10-01 PROCEDURE — 360N000023 HC SURGERY LEVEL 3 EA 15 ADDTL MIN UMMC: Performed by: OBSTETRICS & GYNECOLOGY

## 2020-10-01 PROCEDURE — 87653 STREP B DNA AMP PROBE: CPT | Performed by: ADVANCED PRACTICE MIDWIFE

## 2020-10-01 PROCEDURE — 250N000011 HC RX IP 250 OP 636: Performed by: STUDENT IN AN ORGANIZED HEALTH CARE EDUCATION/TRAINING PROGRAM

## 2020-10-01 PROCEDURE — 86900 BLOOD TYPING SEROLOGIC ABO: CPT | Performed by: STUDENT IN AN ORGANIZED HEALTH CARE EDUCATION/TRAINING PROGRAM

## 2020-10-01 PROCEDURE — 86923 COMPATIBILITY TEST ELECTRIC: CPT | Performed by: STUDENT IN AN ORGANIZED HEALTH CARE EDUCATION/TRAINING PROGRAM

## 2020-10-01 PROCEDURE — 99207 PR NO CHARGE LOS: CPT

## 2020-10-01 PROCEDURE — 271N000001 HC OR GENERAL SUPPLY NON-STERILE: Performed by: OBSTETRICS & GYNECOLOGY

## 2020-10-01 PROCEDURE — 87186 SC STD MICRODIL/AGAR DIL: CPT | Performed by: ADVANCED PRACTICE MIDWIFE

## 2020-10-01 PROCEDURE — 76819 FETAL BIOPHYS PROFIL W/O NST: CPT

## 2020-10-01 PROCEDURE — 76816 OB US FOLLOW-UP PER FETUS: CPT | Mod: 59

## 2020-10-01 PROCEDURE — C9290 INJ, BUPIVACAINE LIPOSOME: HCPCS | Performed by: STUDENT IN AN ORGANIZED HEALTH CARE EDUCATION/TRAINING PROGRAM

## 2020-10-01 PROCEDURE — 36415 COLL VENOUS BLD VENIPUNCTURE: CPT | Performed by: STUDENT IN AN ORGANIZED HEALTH CARE EDUCATION/TRAINING PROGRAM

## 2020-10-01 PROCEDURE — 82565 ASSAY OF CREATININE: CPT | Performed by: STUDENT IN AN ORGANIZED HEALTH CARE EDUCATION/TRAINING PROGRAM

## 2020-10-01 PROCEDURE — 250N000009 HC RX 250: Performed by: STUDENT IN AN ORGANIZED HEALTH CARE EDUCATION/TRAINING PROGRAM

## 2020-10-01 PROCEDURE — 999N001017 HC STATISTIC GLUCOSE BY METER IP

## 2020-10-01 PROCEDURE — 250N000013 HC RX MED GY IP 250 OP 250 PS 637

## 2020-10-01 PROCEDURE — 761N000003 HC RECOVERY PHASE 1 LEVEL 2 FIRST HR: Performed by: OBSTETRICS & GYNECOLOGY

## 2020-10-01 PROCEDURE — G0463 HOSPITAL OUTPT CLINIC VISIT: HCPCS | Mod: 25

## 2020-10-01 PROCEDURE — 272N000001 HC OR GENERAL SUPPLY STERILE: Performed by: OBSTETRICS & GYNECOLOGY

## 2020-10-01 PROCEDURE — 85027 COMPLETE CBC AUTOMATED: CPT | Performed by: STUDENT IN AN ORGANIZED HEALTH CARE EDUCATION/TRAINING PROGRAM

## 2020-10-01 PROCEDURE — 85025 COMPLETE CBC W/AUTO DIFF WBC: CPT | Performed by: STUDENT IN AN ORGANIZED HEALTH CARE EDUCATION/TRAINING PROGRAM

## 2020-10-01 PROCEDURE — 59514 CESAREAN DELIVERY ONLY: CPT | Mod: 22 | Performed by: OBSTETRICS & GYNECOLOGY

## 2020-10-01 PROCEDURE — 76819 FETAL BIOPHYS PROFIL W/O NST: CPT | Mod: 59

## 2020-10-01 PROCEDURE — 96372 THER/PROPH/DIAG INJ SC/IM: CPT | Performed by: STUDENT IN AN ORGANIZED HEALTH CARE EDUCATION/TRAINING PROGRAM

## 2020-10-01 PROCEDURE — 360N000022 HC SURGERY LEVEL 3 1ST 30 MIN - UMMC: Performed by: OBSTETRICS & GYNECOLOGY

## 2020-10-01 PROCEDURE — 86901 BLOOD TYPING SEROLOGIC RH(D): CPT | Performed by: STUDENT IN AN ORGANIZED HEALTH CARE EDUCATION/TRAINING PROGRAM

## 2020-10-01 PROCEDURE — 370N000001 HC ANESTHESIA TECHNICAL FEE, 1ST 30 MIN: Performed by: OBSTETRICS & GYNECOLOGY

## 2020-10-01 PROCEDURE — 761N000004 HC RECOVERY PHASE 1 LEVEL 2 EA ADDTL HR: Performed by: OBSTETRICS & GYNECOLOGY

## 2020-10-01 PROCEDURE — 88307 TISSUE EXAM BY PATHOLOGIST: CPT | Mod: TC | Performed by: STUDENT IN AN ORGANIZED HEALTH CARE EDUCATION/TRAINING PROGRAM

## 2020-10-01 PROCEDURE — 88307 TISSUE EXAM BY PATHOLOGIST: CPT | Mod: 26 | Performed by: PATHOLOGY

## 2020-10-01 PROCEDURE — 76819 FETAL BIOPHYS PROFIL W/O NST: CPT | Mod: 26 | Performed by: OBSTETRICS & GYNECOLOGY

## 2020-10-01 PROCEDURE — 258N000003 HC RX IP 258 OP 636

## 2020-10-01 PROCEDURE — 999N000139 HC STATISTIC PRE-PROCEDURE ASSESSMENT II: Performed by: OBSTETRICS & GYNECOLOGY

## 2020-10-01 PROCEDURE — 76816 OB US FOLLOW-UP PER FETUS: CPT | Mod: 26 | Performed by: OBSTETRICS & GYNECOLOGY

## 2020-10-01 RX ORDER — BETAMETHASONE SODIUM PHOSPHATE AND BETAMETHASONE ACETATE 3; 3 MG/ML; MG/ML
12 INJECTION, SUSPENSION INTRA-ARTICULAR; INTRALESIONAL; INTRAMUSCULAR; SOFT TISSUE EVERY 24 HOURS
Status: DISCONTINUED | OUTPATIENT
Start: 2020-10-01 | End: 2020-10-01

## 2020-10-01 RX ORDER — MONTELUKAST SODIUM 10 MG/1
10 TABLET ORAL AT BEDTIME
Status: DISCONTINUED | OUTPATIENT
Start: 2020-10-01 | End: 2020-10-01

## 2020-10-01 RX ORDER — SODIUM CHLORIDE, SODIUM LACTATE, POTASSIUM CHLORIDE, CALCIUM CHLORIDE 600; 310; 30; 20 MG/100ML; MG/100ML; MG/100ML; MG/100ML
INJECTION, SOLUTION INTRAVENOUS
Status: COMPLETED
Start: 2020-10-01 | End: 2020-10-01

## 2020-10-01 RX ORDER — PROCHLORPERAZINE 25 MG
25 SUPPOSITORY, RECTAL RECTAL EVERY 12 HOURS PRN
Status: DISCONTINUED | OUTPATIENT
Start: 2020-10-01 | End: 2020-10-06 | Stop reason: HOSPADM

## 2020-10-01 RX ORDER — KETOROLAC TROMETHAMINE 30 MG/ML
30 INJECTION, SOLUTION INTRAMUSCULAR; INTRAVENOUS EVERY 6 HOURS
Status: COMPLETED | OUTPATIENT
Start: 2020-10-01 | End: 2020-10-02

## 2020-10-01 RX ORDER — FENTANYL CITRATE 50 UG/ML
INJECTION, SOLUTION INTRAMUSCULAR; INTRAVENOUS PRN
Status: DISCONTINUED | OUTPATIENT
Start: 2020-10-01 | End: 2020-10-01

## 2020-10-01 RX ORDER — AMOXICILLIN 250 MG
2 CAPSULE ORAL 2 TIMES DAILY
Status: DISCONTINUED | OUTPATIENT
Start: 2020-10-01 | End: 2020-10-06 | Stop reason: HOSPADM

## 2020-10-01 RX ORDER — OXYTOCIN/0.9 % SODIUM CHLORIDE 30/500 ML
340 PLASTIC BAG, INJECTION (ML) INTRAVENOUS CONTINUOUS PRN
Status: DISCONTINUED | OUTPATIENT
Start: 2020-10-01 | End: 2020-10-06 | Stop reason: HOSPADM

## 2020-10-01 RX ORDER — OXYTOCIN/0.9 % SODIUM CHLORIDE 30/500 ML
100 PLASTIC BAG, INJECTION (ML) INTRAVENOUS CONTINUOUS
Status: DISCONTINUED | OUTPATIENT
Start: 2020-10-01 | End: 2020-10-06 | Stop reason: HOSPADM

## 2020-10-01 RX ORDER — OXYCODONE HYDROCHLORIDE 5 MG/1
5 TABLET ORAL EVERY 4 HOURS PRN
Status: DISCONTINUED | OUTPATIENT
Start: 2020-10-01 | End: 2020-10-06 | Stop reason: HOSPADM

## 2020-10-01 RX ORDER — CEFAZOLIN SODIUM 2 G/100ML
2 INJECTION, SOLUTION INTRAVENOUS
Status: COMPLETED | OUTPATIENT
Start: 2020-10-01 | End: 2020-10-01

## 2020-10-01 RX ORDER — FENTANYL CITRATE-0.9 % NACL/PF 10 MCG/ML
PLASTIC BAG, INJECTION (ML) INTRAVENOUS CONTINUOUS PRN
Status: DISCONTINUED | OUTPATIENT
Start: 2020-10-01 | End: 2020-10-01

## 2020-10-01 RX ORDER — MISOPROSTOL 200 UG/1
800 TABLET ORAL
Status: DISCONTINUED | OUTPATIENT
Start: 2020-10-01 | End: 2020-10-06 | Stop reason: HOSPADM

## 2020-10-01 RX ORDER — DEXTROSE, SODIUM CHLORIDE, SODIUM LACTATE, POTASSIUM CHLORIDE, AND CALCIUM CHLORIDE 5; .6; .31; .03; .02 G/100ML; G/100ML; G/100ML; G/100ML; G/100ML
INJECTION, SOLUTION INTRAVENOUS CONTINUOUS
Status: DISCONTINUED | OUTPATIENT
Start: 2020-10-01 | End: 2020-10-06 | Stop reason: HOSPADM

## 2020-10-01 RX ORDER — ASPIRIN 81 MG/1
81 TABLET ORAL DAILY
Status: DISCONTINUED | OUTPATIENT
Start: 2020-10-01 | End: 2020-10-01

## 2020-10-01 RX ORDER — ONDANSETRON 2 MG/ML
4 INJECTION INTRAMUSCULAR; INTRAVENOUS EVERY 6 HOURS PRN
Status: DISCONTINUED | OUTPATIENT
Start: 2020-10-01 | End: 2020-10-06 | Stop reason: HOSPADM

## 2020-10-01 RX ORDER — MISOPROSTOL 200 UG/1
TABLET ORAL
Status: DISCONTINUED
Start: 2020-10-01 | End: 2020-10-01 | Stop reason: HOSPADM

## 2020-10-01 RX ORDER — ACETAMINOPHEN 325 MG/1
975 TABLET ORAL EVERY 4 HOURS PRN
Status: DISCONTINUED | OUTPATIENT
Start: 2020-10-01 | End: 2020-10-01

## 2020-10-01 RX ORDER — BISACODYL 10 MG
10 SUPPOSITORY, RECTAL RECTAL DAILY PRN
Status: DISCONTINUED | OUTPATIENT
Start: 2020-10-03 | End: 2020-10-06 | Stop reason: HOSPADM

## 2020-10-01 RX ORDER — HYDROCORTISONE 2.5 %
CREAM (GRAM) TOPICAL 3 TIMES DAILY PRN
Status: DISCONTINUED | OUTPATIENT
Start: 2020-10-01 | End: 2020-10-06 | Stop reason: HOSPADM

## 2020-10-01 RX ORDER — MODIFIED LANOLIN
OINTMENT (GRAM) TOPICAL
Status: DISCONTINUED | OUTPATIENT
Start: 2020-10-01 | End: 2020-10-06 | Stop reason: HOSPADM

## 2020-10-01 RX ORDER — BUPIVACAINE HYDROCHLORIDE 2.5 MG/ML
INJECTION, SOLUTION EPIDURAL; INFILTRATION; INTRACAUDAL PRN
Status: DISCONTINUED | OUTPATIENT
Start: 2020-10-01 | End: 2020-10-01

## 2020-10-01 RX ORDER — ACETAMINOPHEN 325 MG/1
975 TABLET ORAL EVERY 6 HOURS
Status: DISCONTINUED | OUTPATIENT
Start: 2020-10-01 | End: 2020-10-06 | Stop reason: HOSPADM

## 2020-10-01 RX ORDER — MAGNESIUM SULFATE IN WATER 40 MG/ML
2 INJECTION, SOLUTION INTRAVENOUS CONTINUOUS
Status: DISCONTINUED | OUTPATIENT
Start: 2020-10-01 | End: 2020-10-01

## 2020-10-01 RX ORDER — CALCIUM GLUCONATE 94 MG/ML
1 INJECTION, SOLUTION INTRAVENOUS
Status: DISCONTINUED | OUTPATIENT
Start: 2020-10-01 | End: 2020-10-01

## 2020-10-01 RX ORDER — LIDOCAINE 40 MG/G
CREAM TOPICAL
Status: DISCONTINUED | OUTPATIENT
Start: 2020-10-01 | End: 2020-10-01

## 2020-10-01 RX ORDER — CITRIC ACID/SODIUM CITRATE 334-500MG
SOLUTION, ORAL ORAL
Status: COMPLETED
Start: 2020-10-01 | End: 2020-10-01

## 2020-10-01 RX ORDER — SODIUM CHLORIDE, SODIUM LACTATE, POTASSIUM CHLORIDE, CALCIUM CHLORIDE 600; 310; 30; 20 MG/100ML; MG/100ML; MG/100ML; MG/100ML
INJECTION, SOLUTION INTRAVENOUS CONTINUOUS PRN
Status: DISCONTINUED | OUTPATIENT
Start: 2020-10-01 | End: 2020-10-01

## 2020-10-01 RX ORDER — LIDOCAINE 40 MG/G
CREAM TOPICAL
Status: DISCONTINUED | OUTPATIENT
Start: 2020-10-01 | End: 2020-10-01 | Stop reason: HOSPADM

## 2020-10-01 RX ORDER — OXYTOCIN/0.9 % SODIUM CHLORIDE 30/500 ML
PLASTIC BAG, INJECTION (ML) INTRAVENOUS CONTINUOUS PRN
Status: DISCONTINUED | OUTPATIENT
Start: 2020-10-01 | End: 2020-10-01

## 2020-10-01 RX ORDER — SODIUM CHLORIDE, SODIUM LACTATE, POTASSIUM CHLORIDE, CALCIUM CHLORIDE 600; 310; 30; 20 MG/100ML; MG/100ML; MG/100ML; MG/100ML
INJECTION, SOLUTION INTRAVENOUS CONTINUOUS
Status: DISCONTINUED | OUTPATIENT
Start: 2020-10-01 | End: 2020-10-01 | Stop reason: HOSPADM

## 2020-10-01 RX ORDER — EPHEDRINE SULFATE 50 MG/ML
INJECTION, SOLUTION INTRAMUSCULAR; INTRAVENOUS; SUBCUTANEOUS PRN
Status: DISCONTINUED | OUTPATIENT
Start: 2020-10-01 | End: 2020-10-01

## 2020-10-01 RX ORDER — ALBUTEROL SULFATE 90 UG/1
2 AEROSOL, METERED RESPIRATORY (INHALATION) EVERY 4 HOURS PRN
Status: DISCONTINUED | OUTPATIENT
Start: 2020-10-01 | End: 2020-10-01

## 2020-10-01 RX ORDER — CITRIC ACID/SODIUM CITRATE 334-500MG
30 SOLUTION, ORAL ORAL
Status: COMPLETED | OUTPATIENT
Start: 2020-10-01 | End: 2020-10-01

## 2020-10-01 RX ORDER — MORPHINE SULFATE 1 MG/ML
INJECTION, SOLUTION EPIDURAL; INTRATHECAL; INTRAVENOUS PRN
Status: DISCONTINUED | OUTPATIENT
Start: 2020-10-01 | End: 2020-10-01

## 2020-10-01 RX ORDER — TRANEXAMIC ACID 100 MG/ML
INJECTION, SOLUTION INTRAVENOUS
Status: DISCONTINUED
Start: 2020-10-01 | End: 2020-10-01 | Stop reason: HOSPADM

## 2020-10-01 RX ORDER — SIMETHICONE 80 MG
80 TABLET,CHEWABLE ORAL 4 TIMES DAILY PRN
Status: DISCONTINUED | OUTPATIENT
Start: 2020-10-01 | End: 2020-10-06 | Stop reason: HOSPADM

## 2020-10-01 RX ORDER — OXYTOCIN 10 [USP'U]/ML
10 INJECTION, SOLUTION INTRAMUSCULAR; INTRAVENOUS
Status: DISCONTINUED | OUTPATIENT
Start: 2020-10-01 | End: 2020-10-06 | Stop reason: HOSPADM

## 2020-10-01 RX ORDER — ONDANSETRON 2 MG/ML
4 INJECTION INTRAMUSCULAR; INTRAVENOUS EVERY 6 HOURS PRN
Status: DISCONTINUED | OUTPATIENT
Start: 2020-10-01 | End: 2020-10-01

## 2020-10-01 RX ORDER — BUPIVACAINE HYDROCHLORIDE 7.5 MG/ML
INJECTION, SOLUTION INTRASPINAL PRN
Status: DISCONTINUED | OUTPATIENT
Start: 2020-10-01 | End: 2020-10-01

## 2020-10-01 RX ORDER — LIDOCAINE 40 MG/G
CREAM TOPICAL
Status: DISCONTINUED | OUTPATIENT
Start: 2020-10-01 | End: 2020-10-06 | Stop reason: HOSPADM

## 2020-10-01 RX ORDER — CEFAZOLIN SODIUM 1 G/3ML
1 INJECTION, POWDER, FOR SOLUTION INTRAMUSCULAR; INTRAVENOUS SEE ADMIN INSTRUCTIONS
Status: DISCONTINUED | OUTPATIENT
Start: 2020-10-01 | End: 2020-10-01 | Stop reason: HOSPADM

## 2020-10-01 RX ORDER — AZITHROMYCIN 500 MG/5ML
500 INJECTION, POWDER, LYOPHILIZED, FOR SOLUTION INTRAVENOUS
Status: COMPLETED | OUTPATIENT
Start: 2020-10-01 | End: 2020-10-01

## 2020-10-01 RX ORDER — IBUPROFEN 800 MG/1
800 TABLET, FILM COATED ORAL EVERY 6 HOURS
Status: DISCONTINUED | OUTPATIENT
Start: 2020-10-02 | End: 2020-10-06 | Stop reason: HOSPADM

## 2020-10-01 RX ORDER — NALOXONE HYDROCHLORIDE 0.4 MG/ML
.1-.4 INJECTION, SOLUTION INTRAMUSCULAR; INTRAVENOUS; SUBCUTANEOUS
Status: DISCONTINUED | OUTPATIENT
Start: 2020-10-01 | End: 2020-10-06 | Stop reason: HOSPADM

## 2020-10-01 RX ORDER — AMOXICILLIN 250 MG
1 CAPSULE ORAL 2 TIMES DAILY
Status: DISCONTINUED | OUTPATIENT
Start: 2020-10-01 | End: 2020-10-06 | Stop reason: HOSPADM

## 2020-10-01 RX ADMIN — SODIUM CHLORIDE, SODIUM LACTATE, POTASSIUM CHLORIDE, CALCIUM CHLORIDE 1000 ML: 600; 310; 30; 20 INJECTION, SOLUTION INTRAVENOUS at 11:18

## 2020-10-01 RX ADMIN — SODIUM CITRATE AND CITRIC ACID MONOHYDRATE 30 ML: 500; 334 SOLUTION ORAL at 12:43

## 2020-10-01 RX ADMIN — SODIUM CHLORIDE, SODIUM LACTATE, POTASSIUM CHLORIDE, CALCIUM CHLORIDE AND DEXTROSE MONOHYDRATE 125 ML/HR: 5; 600; 310; 30; 20 INJECTION, SOLUTION INTRAVENOUS at 22:40

## 2020-10-01 RX ADMIN — ONDANSETRON 4 MG: 2 INJECTION INTRAMUSCULAR; INTRAVENOUS at 16:24

## 2020-10-01 RX ADMIN — BUPIVACAINE HYDROCHLORIDE 20 ML: 2.5 INJECTION, SOLUTION EPIDURAL; INFILTRATION; INTRACAUDAL at 14:40

## 2020-10-01 RX ADMIN — BUPIVACAINE HYDROCHLORIDE IN DEXTROSE 1.6 ML: 7.5 INJECTION, SOLUTION SUBARACHNOID at 13:20

## 2020-10-01 RX ADMIN — PROCHLORPERAZINE EDISYLATE 10 MG: 5 INJECTION INTRAMUSCULAR; INTRAVENOUS at 16:48

## 2020-10-01 RX ADMIN — KETOROLAC TROMETHAMINE 30 MG: 30 INJECTION, SOLUTION INTRAMUSCULAR at 22:03

## 2020-10-01 RX ADMIN — MORPHINE SULFATE 0.1 MG: 1 INJECTION, SOLUTION EPIDURAL; INTRATHECAL; INTRAVENOUS at 13:20

## 2020-10-01 RX ADMIN — FENTANYL CITRATE 10 MCG: 50 INJECTION, SOLUTION INTRAMUSCULAR; INTRAVENOUS at 13:20

## 2020-10-01 RX ADMIN — Medication 5 MG: at 13:58

## 2020-10-01 RX ADMIN — Medication 30 ML: at 12:43

## 2020-10-01 RX ADMIN — Medication 50 MCG/MIN: at 13:20

## 2020-10-01 RX ADMIN — MAGNESIUM SULFATE IN WATER 2 G/HR: 40 INJECTION, SOLUTION INTRAVENOUS at 11:55

## 2020-10-01 RX ADMIN — OXYTOCIN-SODIUM CHLORIDE 0.9% IV SOLN 30 UNIT/500ML 300 ML/HR: 30-0.9/5 SOLUTION at 13:44

## 2020-10-01 RX ADMIN — BUPIVACAINE 20 ML: 13.3 INJECTION, SUSPENSION, LIPOSOMAL INFILTRATION at 14:40

## 2020-10-01 RX ADMIN — SODIUM CHLORIDE, POTASSIUM CHLORIDE, SODIUM LACTATE AND CALCIUM CHLORIDE: 600; 310; 30; 20 INJECTION, SOLUTION INTRAVENOUS at 13:12

## 2020-10-01 RX ADMIN — SENNOSIDES AND DOCUSATE SODIUM 1 TABLET: 8.6; 5 TABLET ORAL at 20:14

## 2020-10-01 RX ADMIN — Medication 2 G: at 13:27

## 2020-10-01 RX ADMIN — Medication 100 ML/HR: at 15:30

## 2020-10-01 RX ADMIN — Medication 500 MG: at 13:25

## 2020-10-01 RX ADMIN — MAGNESIUM SULFATE HEPTAHYDRATE 6 G: 500 INJECTION, SOLUTION INTRAMUSCULAR; INTRAVENOUS at 11:21

## 2020-10-01 RX ADMIN — ACETAMINOPHEN 975 MG: 325 TABLET, FILM COATED ORAL at 12:41

## 2020-10-01 RX ADMIN — ACETAMINOPHEN 975 MG: 325 TABLET, FILM COATED ORAL at 19:00

## 2020-10-01 RX ADMIN — BETAMETHASONE SODIUM PHOSPHATE AND BETAMETHASONE ACETATE 12 MG: 3; 3 INJECTION, SUSPENSION INTRA-ARTICULAR; INTRALESIONAL; INTRAMUSCULAR at 10:46

## 2020-10-01 RX ADMIN — SODIUM CHLORIDE, POTASSIUM CHLORIDE, SODIUM LACTATE AND CALCIUM CHLORIDE 1000 ML: 600; 310; 30; 20 INJECTION, SOLUTION INTRAVENOUS at 11:18

## 2020-10-01 RX ADMIN — PHENYLEPHRINE HYDROCHLORIDE 100 MCG: 10 INJECTION INTRAVENOUS at 13:58

## 2020-10-01 RX ADMIN — KETOROLAC TROMETHAMINE 30 MG: 30 INJECTION, SOLUTION INTRAMUSCULAR at 16:05

## 2020-10-01 ASSESSMENT — ACTIVITIES OF DAILY LIVING (ADL)
FALL_HISTORY_WITHIN_LAST_SIX_MONTHS: NO
TOILETING_ISSUES: NO

## 2020-10-01 ASSESSMENT — PAIN SCALES - GENERAL: PAINLEVEL: NO PAIN (0)

## 2020-10-01 NOTE — ANESTHESIA PREPROCEDURE EVALUATION
Anesthesia Pre-Procedure Evaluation    Patient: Ruby Morrison   MRN:     [id number] Gender:   female   Age:    35 year old :      1985        Preoperative Diagnosis: * No pre-op diagnosis entered *   Procedure(s):   SECTION     LABS:  CBC:   Lab Results   Component Value Date    WBC 12.9 (H) 10/01/2020    WBC 15.2 (H) 2020    HGB 9.9 (L) 10/01/2020    HGB 11.3 (L) 2020    HCT 31.8 (L) 10/01/2020    HCT 36.2 2020     10/01/2020     2020     BMP:   Lab Results   Component Value Date     2020     10/10/2018    POTASSIUM 3.9 2020    POTASSIUM 4.2 10/10/2018    CHLORIDE 105 2020    CHLORIDE 103 10/10/2018    CO2 25 2020    CO2 25 10/10/2018    BUN 8 2020    BUN 9 10/10/2018    CR 0.42 (L) 10/01/2020    CR 0.45 (L) 2020    GLC 89 2020    GLC 86 2019     COAGS: No results found for: PTT, INR, FIBR  POC:   Lab Results   Component Value Date    HCG neg 2015     OTHER:   Lab Results   Component Value Date    A1C 5.8 (H) 2020    CORIE 9.0 2020    MAG 1.7 2015    ALBUMIN 2.4 (L) 2020    PROTTOTAL 7.0 2020    ALT 25 10/01/2020    AST 23 10/01/2020    ALKPHOS 112 2020    BILITOTAL 0.3 2020    LIPASE 105 2015    TSH 1.48 2020        Preop Vitals    BP Readings from Last 3 Encounters:   10/01/20 104/55   10/01/20 139/89   20 128/81    Pulse Readings from Last 3 Encounters:   10/01/20 110   10/01/20 108   20 98      Resp Readings from Last 3 Encounters:   10/01/20 18   10/01/20 20   20 20    SpO2 Readings from Last 3 Encounters:   10/01/20 99%   20 98%   20 100%      Temp Readings from Last 1 Encounters:   10/01/20 37.1  C (98.7  F) (Oral)    Ht Readings from Last 1 Encounters:   19 1.524 m (5')      Wt Readings from Last 1 Encounters:   10/01/20 100.3 kg (221 lb 3.2 oz)    Estimated body mass index is 43.2 kg/m  as  calculated from the following:    Height as of 12/3/19: 1.524 m (5').    Weight as of an earlier encounter on 10/1/20: 100.3 kg (221 lb 3.2 oz).     LDA:  Peripheral IV 10/01/20 Left;Dorsal Hand (Active)   Number of days: 0       Peripheral IV 10/01/20 Right;Dorsal Hand (Active)   Number of days: 0       Urethral Catheter Latex 16 fr (Active)   Number of days: 0        Past Medical History:   Diagnosis Date     Asthma      Cat allergies      Encounter for supervision of other normal pregnancy 12/8/2015     GERD (gastroesophageal reflux disease)      Group B Streptococcus urinary tract infection affecting pregnancy in first trimester 11/1/2017     Hx of previous reproductive problem      Hypertension     during pregnancy     Mixed hyperlipidemia 11/5/2019      Past Surgical History:   Procedure Laterality Date     HYMENOTOMY        Allergies   Allergen Reactions     Garlic Swelling                 PHYSICAL EXAM:   Mental Status/Neuro:    Airway: Facies: Feasible  Mallampati: II  Mouth/Opening: Full  TM distance: > 6 cm  Neck ROM: Full   Respiratory: Auscultation: CTAB     Resp. Rate: Normal     Resp. Effort: Normal      CV: Rhythm: Regular  Rate: Age appropriate  Heart: Normal Sounds  Edema: None   Comments:                      Assessment:   ASA SCORE: 2       NPO Status: NPO Appropriate     Plan:   Anes. Type:  Spinal   Pre-Medication: None   Induction:  N/a   Airway: Native Airway   Access/Monitoring: PIV   Maintenance: N/a     Postop Plan:   Postop Pain: Regional  Postop Sedation/Airway: Not planned  Disposition: Inpatient/Admit     PONV Management: Adult Risk Factors: Female   Prevention: Ondansetron                   Castillo Goodson DO

## 2020-10-01 NOTE — ANESTHESIA PROCEDURE NOTES
Spinal/LP Procedure Note    Spinal Block      Staff -   Anesthesiologist:  Castillo Goodson DO  Resident/Fellow: Blaise Bernal MD  Performed By: resident  Location: OB  Procedure Start/Stop Times:     patient identified, IV checked, risks and benefits discussed, informed consent, monitors and equipment checked, pre-op evaluation, at physician/surgeon's request and post-op pain management      Correct Patient: Yes      Correct Position: Yes      Correct Site: Yes      Correct Procedure: Yes      Correct Laterality:  Yes    Site Marked:  Yes and No  Procedure:     Procedure:  Intrathecal    ASA:  2    Position:  Sitting    Insertion site:  L4-5    Approach:  Midline    Needle Type:  Kalpesh    Needle gauge (G):  25    Local Skin Infiltration:  2% lidocaine    amount (ml):  3    Introducer used: Yes      Introducer gauge:  20 G    Attempts:  1    Redirects:  0    CSF:  Clear    Paresthesias:  No  Assessment/Narrative:      Patient tolerated well.

## 2020-10-01 NOTE — NURSING NOTE
Ruby here for f/u obv due to preg c/b tri/tri triplets. Pt reports +FM x 3, denies SROM, and denies vag bleeding. Pt reports increased back pain since last evening. Pt wondering if this is normal for the next 5 weeks?  Pt states her  is worried this is back labor. Pt denies headache, denies epigastric pain or any other s/s of preeclampsia. Enriqueta Girard CNM in to talk with pt and do SVE. Pt dilated to 4 cm with BBOW. Pt had GBS collected and was taken to the lab. Pt was taken to L&D via wheelchair for evaluation. Report given to L&D. Maria Elena Estes RN

## 2020-10-01 NOTE — PROVIDER NOTIFICATION
10/01/20 1136   Provider Notification   Provider Name/Title Dr Granger    Method of Notification At Bedside   Plan for delivery. Pt in agreement

## 2020-10-01 NOTE — OP NOTE
[facility]   OPERATIVE NOTE:  SECTION     Surgery Date:  2020  Surgeon(s): Abigail Burns MD  Assistants:  Keya Granger MD, PGY-4    Preoperative Diagnoses:  1.  at 30w3d  2. Tri-Tri triplet pregnancy  3.  labor  4. Superimposed preeclampsia without SF  5. Obesity    Postoperative diagnoses:  1.  at 30w3d, now delivered    Procedure performed:  Primary low segment transverse  section via Pfannenstiel skin incision with two layer uterine closure    Anesthesia:  Spinal with duramorph  Est Blood Loss (mL): 1682 mL  Fluid replacement: 1300 mL crystalloid.  Urine output: 300 mL clear urine at the end of the case   Specimens: Cord blood, gases, and placenta  Complications: None      Operative findings:   1. Baby A: viable female infant at 1341 hours on 2018. Apgars 6,7,8 at 1,5 and 10 minutes. Weight 1440g.  Fetal presentation: cephalic. Amniotic fluid: clear.  Arterial pH 7.33, base deficit 1.7.   2. Baby B: viable male infant at 1342 hours on 2018. Apgars 7&9 at 1&5 minutes. Weight 1660 g.   Fetal presentation: arturo breech. Amniotic fluid: clear. Arterial pH 7.28, base deficit 2.4.  3. Baby C: viable male infant at 1343 hours on 2018. Apgars 6&9 at 1&5 minutes. Weight 1430g.  Fetal presentation: arturo breech. Amniotic fluid: clear. Arterial pH 7.28, base deficit 5.7.  4. All three placentas intact with 3 vessel cord.     5. Normal appearing uterus and fallopian tubes. Enlarged, polycystic appearing ovaries.   6.  No intraabdominal adhesions.  No abdominal wall adhesions     Indication: Ruby Morrison is a 35 year old,  with tri-tri triplets, who was admitted at 30w3d with concern for  labor. Cervix progressed from 4 to 5.5 cm over 2.5 hours and she was noted to be lyndsay regularly. Given progressing  labor and triplet gestation,  delivery was recommended. The risks, benefits, and  alternatives of  delivery were explained and the patient agreed to proceed.     Procedure details:  After obtaining informed consent, the patient was taken to the operating room. She received 2g Ancef and 500 mg Azithromycin prior to the skin incision. She was placed in the dorsal supine position with a leftward tilt and prepped and draped in the usual sterile fashion.     Following test of adequate spinal anesthesia, the abdomen was entered through a Pfannenstiel skin incision. The skin incision was made sharply and carried through the subcutaneous tissue to the fascia.  Fascia was incised in the midline and extended bluntly off the rectus fascia. The muscle was  in the midline. The peritoneum was entered bluntly and the opening extended by digital dissection with care to avoid the bladder. A bladder blade was placed. The lower segment of the uterus was opened sharply in a transverse fashion and extended with digital pressure. Baby A's head was elevated to the hysterotomy was delivered atraumatically. Cord was immediately clamped and cut and baby was handed off to awaiting NICU staff. The breech of baby B was the grasped, amniotomy was performed, and fetus was delivered easily via standard breech maneuvers. Cord was clamped and cut and baby was handed off to NICU staff. Finally, the breech of baby C was grasped, amniotomy was performed, and baby was delivered easily via standard breech maneuvers. Again cord was clamped and cut and infant was handed off to NICU staff. A segment of the cord from each infant was cut and sent aside for cord gases if needed. The placentas were expressed.  The uterus was exteriorized from the abdomen and cleared of all clots and debris.  The uterus was massaged and was noted to be firm.  Pitocin was given through the running IV.  With vigorous massage as well as administration of pitocin, good uterine tone was achieved. The hysterotomy was repaired with 0-vicryl suture  in a running locked fashion. A 2nd layer of 0-monocryl was  used to imbricate the incision. Area of oozing at the hysterotomy was controlled with Figure of X stitch x2 using 0-Monocryl and then 0-Vicryl with excellent hemostasis. The posterior cul-de-sac was suctioned and the uterus was returned to the abdomen.      The bilateral pericolic gutters were suctioned.  The hysterotomy was again inspected and found to be hemostatic.  The abdominal wall was examined and also found to be hemostatic.  The fascia was closed with a running suture of 0-Vicryl.  Subcutaneous tissue was irrigated. Areas that were not hemostatic were controlled with cautery. The subcutaneous tissue was greater than 3cm in depth and was re-approximated with 3-0 plain gut. The skin was closed with 4-0 vicryl. The patient tolerated the procedure well and was taken to the recovery room in stable condition. All sponge, needle and instrument counts were correct x2.      Keya Granger MD  Ob/Gyn PGY-4  10/01/20 3:50 PM       I was present and scrubbed through subcutaneous closure. I was immediately available thereafter.   Abigail Burns MD

## 2020-10-01 NOTE — H&P
"L&D History and Physical   2020  Ruby Morrison  [id number]      HPI: Ruby Morrison is a 35 year old  at 30w3d by 7w1d US who presents from clinic due to concern for  labor.     She states that she has been feeling pressure and tense lower back pain that comes and goes. She states this is how labor felt with her first two children.   Overnight she noticed mild abdominal cramping as well, \"like I needed to have a bowel movement but couldn't.\" Also had mild nausea last night. She denies VB or LOF. She denies headache, vision changes, chest pain, shortness of breath, fever, chills, vomiting or other systemic complaints. She is feeling normal fetal movement x3.    She was seen in clinic this morning and was noted to be 4/80/-3 with BBOW and was sent to L&D for further evaluation.     Pregnancy complicated by:  -Tri-tri triplet pregnancy  -Fetus 2 with polyhydramnios  -Fetus 3 with pyelectasis  -Chronic hypertension    ROS: No headaches, vision changes, nausea, vomiting, fevers, chills, chest pain, SOB, abdominal pain, constipation, diarrhea, dysuria, changes in vaginal discharge or edema in extremities noted.     OBHX:   OB History    Para Term  AB Living   3 2 2 0 0 2   SAB TAB Ectopic Multiple Live Births   0 0 0 0 2      # Outcome Date GA Lbr Serge/2nd Weight Sex Delivery Anes PTL Lv   3 Current            2 Term 18 38w1d  2.85 kg (6 lb 4.5 oz) F Vag-Spont EPI N GARY      Name: Cata \"Aishwarya\"      Apgar1: 8  Apgar5: 9   1 Term 16 37w0d / 03:50 2.551 kg (5 lb 10 oz) F Vag-Spont EPI  GARY      Name: Chino \"Gardenia\"      Apgar1: 2  Apgar5: 6       Past Medical History:   Diagnosis Date     Asthma      Cat allergies      Encounter for supervision of other normal pregnancy 2015     GERD (gastroesophageal reflux disease)      Group B Streptococcus urinary tract infection affecting pregnancy in first trimester 2017     Hx of previous reproductive problem      " Hypertension     during pregnancy     Mixed hyperlipidemia 11/5/2019       Past Surgical History:   Procedure Laterality Date     HYMENOTOMY         Medications:   No current facility-administered medications on file prior to encounter.        albuterol (PROAIR HFA/PROVENTIL HFA/VENTOLIN HFA) 108 (90 Base) MCG/ACT inhaler, Inhale 2 puffs into the lungs every 4 hours as needed for shortness of breath / dyspnea       aspirin (ASA) 81 MG EC tablet, Take 1 tablet (81 mg) by mouth daily       metFORMIN (GLUCOPHAGE) 500 MG tablet, Take 1 tablet once a day for a week, then two tablets once a day for a week, and then three tablets once a day if stomach tolerates it.       montelukast (SINGULAIR) 10 MG tablet, TAKE 1 TABLET(10 MG) BY MOUTH AT BEDTIME       order for DME, Equipment being ordered: Wrist brace       polyethylene glycol (MIRALAX) 17 g packet, Take 1 packet by mouth daily       Prenatal Vit-Fe Fumarate-FA (PRENATAL VITAMINS PO), Take 1 tablet by mouth daily       senna-docusate (SENOKOT-S/PERICOLACE) 8.6-50 MG tablet, Take 1 tablet by mouth daily        Allergies   Allergen Reactions     Garlic Swelling       Family History   Problem Relation Age of Onset     Migraines Mother      Hypertension Father      Lipids Father      Diabetes Maternal Grandmother      Diabetes Paternal Grandmother      Cancer Paternal Grandfather         luekemia       SocialHX:   Social History     Tobacco Use     Smoking status: Never Smoker     Smokeless tobacco: Never Used   Substance Use Topics     Alcohol use: Not Currently     Alcohol/week: 0.0 standard drinks     Frequency: Never     Comment: Maybe once every 6 months     Drug use: No       ROS: 10-point ROS negative except as indicated in HPI.    Physical Exam:  Vitals:    10/01/20 1044   BP: (!) 144/94   Pulse: 110   Resp: 18   Temp: 98.7  F (37.1  C)   TempSrc: Oral     General: alert, oriented female, resting in bed in NAD  CV: regular rate and rhythm  Lungs: clear bilaterally,  no crackles or wheezes.  Abdomen: soft, gravid, non-tender  Extremities: bilateral lower extremities non-tender with 1+ edema    SVE: 5/100/-2 with BBOW  Membranes: Intact    BSUS: Fetus A presenting and cephalic, Fetus B maternal right, breech, Fetus C maternal midline, breech.    FHT:   Fetus A: Baseline 140's, moderate variability, +accelerations, no decelerations  Fetus B: Baseline 140's, moderate variability, +accelerations, no decelerations  Fetus C: Baseline 135, moderate variability, +accelerations, no decelerations  [place]: ctx q2-5 mins    Prenatal ultrasounds:  Fetus 1: GENERAL EVALUATION  ---------------------------------------------------------------------------------------------------------  Cardiac activity present.  bpm.  Fetal movements present.  Presentation cephalic, maternal left, presenting.  Placenta Anterior, thick dividing membrane.  Umbilical cord 3 vessel cord.  Amniotic fluid MVP 5.4 cm.        Fetus 2: GENERAL EVALUATION  ---------------------------------------------------------------------------------------------------------  Cardiac activity present.  bpm.  Fetal movements present.  Presentation breech, maternal right, high.  Placenta Anterior, thick dividing membrane.  Umbilical cord 3 vessel cord.  Amniotic fluid MVP 9.1 cm.        Fetus 3: GENERAL EVALUATION  ---------------------------------------------------------------------------------------------------------  Cardiac activity present.  bpm.  Fetal movements present.  Presentation variable, breech midline high.  Placenta Anterior, thick dividing membrane.  Umbilical cord 3 vessel cord.  Amniotic fluid MVP 7.4 cm.        Fetus 1: FETAL BIOMETRY  ---------------------------------------------------------------------------------------------------------  Main Fetal Biometry:  BPD                                        73.5                    mm                         29w 3d                Yosi THOMPSON                                         98.3                    mm                         29w 1d                Nicolaides  HC                                          276.4                  mm                          30w 2d                Hadlock  Cerebellum tr                            35.9                   mm                          30w 6d                Nicolaides  AC                                          261.2                  mm                          30w 2d                Hadlock  Femur                                      57.4                   mm                          30w 1d                Hadlock  Fetal Weight Calculation:  EFW                                       1,517                  g                                     45%         [place]  EFW (lb,oz)                             3 lb 6                  oz  EFW by                                        Hadlock (BPD-HC-AC-FL)  Head / Face / Neck Biometry:                                             2.2                     mm  CM                                          5.5                     mm        Fetus 2: FETAL BIOMETRY  ---------------------------------------------------------------------------------------------------------  Main Fetal Biometry:  BPD                                        75.3                    mm                         30w 1d                Hadlock  OFD                                        108.5                  mm                         32w 4d                 Nicolaides  HC                                          294.7                  mm                          32w 4d                Hadlock  Cerebellum tr                            35.6                   mm                          30w 4d                Nicolaides  AC                                          255.2                  mm                          29w 5d                Hadlock  Femur                                      59.0                   mm                           30w 5d                Hadlock  Fetal Weight Calculation:  EFW                                       1,559                  g                                     49%         [place]  EFW (lb,oz)                             3 lb 7                  oz  EFW by                                        Hadlock (BPD-HC-AC-FL)  Head / Face / Neck Biometry:                                             6.2                     mm  CM                                          6.2                     mm        Fetus 3: FETAL BIOMETRY  ---------------------------------------------------------------------------------------------------------  Main Fetal Biometry:  BPD                                        75.2                    mm                         30w 1d                Hadlock  OFD                                        102.5                  mm                         30w 1d                 Nicolaides  HC                                          284.5                  mm                          31w 2d                Hadlock  Cerebellum tr                            36.5                   mm                          31w 4d                Nicolaides  AC                                          261.1                  mm                          30w 2d                Hadlock  Femur                                      60.3                   mm                          31w 3d                Hadlock  Fetal Weight Calculation:  EFW                                       1,622                  g                                     54%         [place]  EFW (lb,oz)                             3 lb 9                  oz  EFW by                                        Hadlock (BPD-HC-AC-FL)  Head / Face / Neck Biometry:                                             3.3                     mm  CM                                          6.2                     mm        Fetus 1: FETAL  ANATOMY  ---------------------------------------------------------------------------------------------------------  The following structures appear normal:  Head / Neck                         Cranium. Head size. Head shape. Lateral ventricles. Midline falx. Cavum septi pellucidi. Cerebellum. Cisterna magna. Thalami.  Face                                   Lips. Profile. Nose.  Heart / Thorax                      4-chamber view.                                             Diaphragm.  Abdomen                             Stomach. Kidneys. Bladder.  Spine                                  Cervical spine. Thoracic spine. Lumbar spine. Sacral spine.     The following structures were documented previously:  Heart / Thorax                      RVOT view. LVOT view. 3-vessel-trachea view.     Gender: female.        Fetus 2: FETAL ANATOMY  ---------------------------------------------------------------------------------------------------------  The following structures appear normal:  Head / Neck                         Cranium. Head size. Head shape. Lateral ventricles. Midline falx. Cavum septi pellucidi. Cerebellum. Cisterna magna.  Heart / Thorax                      4-chamber view. RVOT view. LVOT view.                                             Diaphragm.  Abdomen                             Stomach. Kidneys. Bladder.  Spine                                  Cervical spine. Thoracic spine. Lumbar spine. Sacral spine.     The following structures were documented previously:  Face                                   Lips. Profile. Nose.  Heart / Thorax                      3-vessel-trachea view.     Gender: male.        Fetus 3: FETAL ANATOMY  ---------------------------------------------------------------------------------------------------------  The following structures appear normal:  Head / Neck                         Cranium. Head size. Head shape. Lateral ventricles. Midline falx. Cavum septi pellucidi. Cerebellum.  Cisterna magna. Thalami.  Face                                   Profile. Nose.  Heart / Thorax                      4-chamber view.                                             Diaphragm.  Abdomen                             Stomach. Bladder.     The following structures could not be visualized:  Abdomen                             Kidneys.     The following structures were documented previously:  Face                                   Lips.  Heart / Thorax                      RVOT view. LVOT view. 3-vessel-trachea view.  Spine                                  Cervical spine. Thoracic spine. Lumbar spine. Sacral spine.     Gender: male.        Fetus 1: BIOPHYSICAL PROFILE  ---------------------------------------------------------------------------------------------------------  2: Fetal breathing movements  2: Gross body movements  2: Fetal tone  2: Amniotic fluid volume  8/8 Biophysical profile score        Fetus 2: BIOPHYSICAL PROFILE  ---------------------------------------------------------------------------------------------------------  2: Fetal breathing movements  2: Gross body movements  2: Fetal tone  2: Amniotic fluid volume  8/8 Biophysical profile score        Fetus 3: BIOPHYSICAL PROFILE  ---------------------------------------------------------------------------------------------------------  2: Fetal breathing movements  2: Gross body movements  2: Fetal tone  2: Amniotic fluid volume  8/8 Biophysical profile score        MATERNAL STRUCTURES  ---------------------------------------------------------------------------------------------------------  Cervix                                  Not visualized  Right Ovary                          Not examined  Left Ovary                            Not examined        RECOMMENDATION  ---------------------------------------------------------------------------------------------------------  Thank-you for referring your patient to assess fetal growth. I  discussed the findings on today's ultrasound with the patient.     Follow-up is scheduled in 1 week for a fluid check. Would recommend repeat evaluation of Triplet C's kidneys if able.     Return to primary provider for continued prenatal care.     If you have questions regarding today's evaluation or if we can be of further service, please contact the [facility].     **Fetal anomalies may be present but not detected**                                                                         IMPRESSION  ---------------------------------------------------------------------------------------------------------  1. Trichorionic triamniotic triplet intrauterine pregnancy at 30w3d here for fetal growth assessment and  surveillance.  2. Growth parameters and estimated fetal weight were consistent with established dates for all three triplets. The inter-triplet discordance is within normal limits.  3. None of the anomalies commonly detected by ultrasound were evident in the limited fetal anatomic survey described above for all three triplet, anatomy limited by  gestational age and fetal lie. There was previously seen UTD-A1 on Triplet C. The kidneys were unable to be adequately visualized today due to fetal position. The bladder  and amniotic fluid volume for Triplet C are normal.  4. Mild polyhydramnios noted today for fetus B with and MVP of 9. The fluid is normal today for both A and C.  5. The BPP is 8/8 for all three triplets.    Prenatal Labs:   Lab Results   Component Value Date    ABO A 2020    RH Pos 2020    AS Neg 2020    HEPBANG Nonreactive 2020    CHPCRT Negative 2020    GCPCRT Negative 2020    TREPAB Negative 10/30/2017    HGB 11.3 (L) 2020       GBS Status:   Lab Results   Component Value Date    GBS Positive (A) 2018       Lab Results   Component Value Date    PAP NIL 07/10/2018       Labs:   Results for orders placed or performed  during the hospital encounter of 10/01/20 (from the past 24 hour(s))   Asymptomatic COVID-19 Virus (Coronavirus) by PCR    Specimen: Nasopharyngeal   Result Value Ref Range    COVID-19 Virus PCR to [facility] - Source Nasopharyngeal     COVID-19 Virus PCR to [facility] - Result       Test received-See reflex to [facility] test SARS CoV2 (COVID-19) Virus RT-PCR       Assessment:   35 year old  at 30w3d by 7w1d US with tri-tri triplets here for  labor. Patient has made cervical change compared to exam in clinic 1 hour ago, although could be 2/2 different examiners. She appears overall comfortable.     Plan:     labor  Tri-Tri triplets  - Patient lyndsay q2-5 mins on toco here, not feeling them currently but was very uncomfortable last night.   - BMZ#1 given on admission  - Magnesium 6g load>2g/hr for fetal neuroprotection  - Discussed given advanced cervical dilation and contractions on tocometry, will likely recommend delivery today. Will repeat cervical exam in 1 hour and if continuing to make change, will recommend delivery via  section at that time. Discussed risks of surgery including bleeding, infection, and damage to surrounding organs including bladder, bowel, blood vessels, nerves, fallopian tubes, ovaries, and babies. Written consent signed. Will discuss case with anesthesia.  - NICU notified    FWB  -Category I, reactive for all triplets  -Continuous monitoring until delivery  -BMZ as above  - All three babies normally grown with concordant growth    Chronic hypertension  -BP's mild range on admission, no sx of preeclampsia  -Baseline HELLP labs & UPC wnl  -Will repeat HELLP labs on admission    PNC  Rh positive, rubella immune, GCT passed      Patient seen and care plan discussed under supervision of Dr. Grant Granger MD  Ob/Gyn PGY-4  10/01/20 10:18 AM       I have seen and examined the patient without the resident. I have reviewed, edited, and agree with the note.      Abigail Burns MD

## 2020-10-01 NOTE — PROGRESS NOTES
S: Ruby Morrison,  at 30w3d presents for routine OB visit. She is questioning if she is in labor. She reports that starting last evening she has been noting indigestion and intense back pain that comes and goes. It was strong enough at times that she reports crying from the pain. It calmed down a bit, but this morning reports that it is still occurring. Denies VB or LOF. Reports +FM.     O:/89   Pulse 108   Resp 20   Wt 100.3 kg (221 lb 3.2 oz)   SpO2 99%   BMI 43.20 kg/m    Physical Exam  Genitourinary:      Genitourinary Comments: SVE: 4/80-90/-3. BBOW palpated.   Pulmonary:      Effort: Pulmonary effort is normal. No respiratory distress.   Abdominal:      Comments: gravid   Neurological:      Mental Status: She is alert and oriented to person, place, and time.   Psychiatric:         Mood and Affect: Mood normal.         Behavior: Behavior normal.       A: Tri/tri triplet pregnancy - likely early labor.   Chronic HTN - not on meds.  Polyhydramnios of fetus 2.  Pyelectasis of fetus 3.    P: Patient sent to L&D for further evaluation.  GBS collected in clinic.  Charge RN updated of impending arrival.        10 minutes was spent face to face with the patient today discussing her history, diagnosis, and follow-up plan as noted above. Over 50% of the visit was spent in counseling and coordination of care.    Total Visit Time: 10 minutes.     Enriqueta Girard CNM on 10/1/2020 at 10:05 AM

## 2020-10-01 NOTE — PROVIDER NOTIFICATION
10/01/20 1550   Provider Notification   Provider Name/Title Dr Granger   Method of Notification Phone   Notification Reason Other   Ruby continues to feel light headed, VSS, lochia scant, uterus firm midline at the umbilicus. Will continue to monitor, likely from magnesium. Will draw CBC, as beginning Hgb 9.9 and qbl 1722. Ok to give Toradol.   Addendum  created 04/11/18 1516 by Marimar Burgess, CRNA    Order sets accessed

## 2020-10-01 NOTE — PROVIDER NOTIFICATION
Pt was feeling light headed- All VS WNL so blood glucose checked as precaution. . No further intervention at this time. Will continue to monitor closely.

## 2020-10-01 NOTE — ANESTHESIA CARE TRANSFER NOTE
Patient: Ruby Morrison    Procedure(s):   SECTION    Diagnosis: * No pre-op diagnosis entered *  Diagnosis Additional Information: No value filed.    Anesthesia Type:   Spinal     Note:  Airway :Room Air  Patient transferred to:PACU  Comments: VSS Patient denies any pain, N/V. All questions answered to RN. Handoff Report: Identifed the Patient, Identified the Reponsible Provider, Reviewed the pertinent medical history, Discussed the surgical course, Reviewed Intra-OP anesthesia mangement and issues during anesthesia, Set expectations for post-procedure period and Allowed opportunity for questions and acknowledgement of understanding      Vitals: (Last set prior to Anesthesia Care Transfer)    CRNA VITALS  10/1/2020 1409 - 10/1/2020 1446      10/1/2020             Ht Rate:  109                Electronically Signed By: Blaise Bernal MD  2020  2:46 PM

## 2020-10-01 NOTE — PROVIDER NOTIFICATION
10/01/20 1557   Provider Notification   Provider Name/Title Dr Granger   Method of Notification In Department   Notification Reason Lab/Diagnostic Study   Elevated protein in urine   no fever/no bruising

## 2020-10-01 NOTE — PROGRESS NOTES
Brief Progress Note    Patient continues to contract q2-3 mins. Feeling some cramping. SVE now 5.5/100/-1, changed from 1 hour prior. Discussed recommendation to proceed with delivery within the next 1 hour.     Keya Granger MD  Ob/Gyn PGY-4  10/01/20 11:39 AM

## 2020-10-01 NOTE — PLAN OF CARE
Patient arrived to [facility] unit via zoom cart at 1720,with belongings, accompanied by spouse/ significant other, with infant in NICU. Received report from Jami SILVERMAN and checked bands. Unit and room orientation started. Call light given and within arms reach; no concerns present at this time. Continue with plan of care.

## 2020-10-01 NOTE — ANESTHESIA POSTPROCEDURE EVALUATION
Anesthesia POST Procedure Evaluation    Patient: Ruby Morrison   MRN:     [id number] Gender:   female   Age:    35 year old :      1985        Preoperative Diagnosis: * No pre-op diagnosis entered *   Procedure(s):   SECTION   Postop Comments: No value filed.     Anesthesia Type: Spinal          Postop Pain Control: Uneventful            Sign Out: Well controlled pain   PONV: No   Neuro/Psych: Uneventful            Sign Out: Acceptable/Baseline neuro status   Airway/Respiratory: Uneventful            Sign Out: Acceptable/Baseline resp. status   CV/Hemodynamics: Uneventful            Sign Out: Acceptable CV status   Other NRE: NONE   DID A NON-ROUTINE EVENT OCCUR? No         Last Anesthesia Record Vitals:  CRNA VITALS  10/1/2020 1409 - 10/1/2020 1509      10/1/2020             Ht Rate:  109          Last PACU Vitals:  Vitals Value Taken Time   /74 10/01/20 1520   Temp 36.6  C (97.8  F) 10/01/20 1445   Pulse 79 10/01/20 1532   Resp 15 10/01/20 1532   SpO2 96 % 10/01/20 1532   Temp src     NIBP     Pulse     SpO2     Resp     Temp     Ht Rate 109 10/01/20 1441   Temp 2     Vitals shown include unvalidated device data.      Electronically Signed By: Castillo Goodson DO, 2020, 3:33 PM

## 2020-10-01 NOTE — DISCHARGE SUMMARY
[facility] Discharge Summary    Ruby Morrison MRN# [id number]   Age: 35 year old YOB: 1985     Date of Admission:  10/1/2020  Date of Discharge::  10/5/2020  Admitting Physician:  Abigail Burns MD  Discharge Physician:  Heather Bowman MD             Admission Diagnoses:    at 30w3d  Tri-Tri triplet pregnancy   labor  Superimposed preeclampsia without SF  Obesity          Discharge Diagnosis:     Same, now , delivered  Acute blood loss anemia          Procedures:     Procedure(s): - Primary low segment transverse  section via Pfannenstiel skin incision with two layer uterine closure  - Spinal anesthesia  - IV magnesium  - Blood transfusion: 2 units of pRBCs                Medications Prior to Admission:     Medications Prior to Admission   Medication Sig Dispense Refill Last Dose     albuterol (PROAIR HFA/PROVENTIL HFA/VENTOLIN HFA) 108 (90 Base) MCG/ACT inhaler Inhale 2 puffs into the lungs every 4 hours as needed for shortness of breath / dyspnea 1 Inhaler 1 More than a month at Unknown time     metFORMIN (GLUCOPHAGE) 500 MG tablet Take 1 tablet once a day for a week, then two tablets once a day for a week, and then three tablets once a day if stomach tolerates it. 90 tablet 3      montelukast (SINGULAIR) 10 MG tablet TAKE 1 TABLET(10 MG) BY MOUTH AT BEDTIME 90 tablet 3      order for DME Equipment being ordered: Wrist brace 1 each 0      polyethylene glycol (MIRALAX) 17 g packet Take 1 packet by mouth daily        Prenatal Vit-Fe Fumarate-FA (PRENATAL VITAMINS PO) Take 1 tablet by mouth daily        senna-docusate (SENOKOT-S/PERICOLACE) 8.6-50 MG tablet Take 1 tablet by mouth daily        [DISCONTINUED] aspirin (ASA) 81 MG EC tablet Take 1 tablet (81 mg) by mouth daily 90 tablet 3 2020 at Unknown time          Discharge Medications:        Review of your medicines      START taking      Dose / Directions   acetaminophen 325 MG tablet  Commonly known as:  TYLENOL      Dose: 650 mg  Take 2 tablets (650 mg) by mouth every 6 hours as needed for mild pain Start after Delivery.  Quantity: 100 tablet  Refills: 0     ferrous sulfate 325 (65 Fe) MG tablet  Commonly known as: FEROSUL      Dose: 325 mg  Take 1 tablet (325 mg) by mouth daily (with breakfast)  Quantity: 30 tablet  Refills: 1     ibuprofen 600 MG tablet  Commonly known as: ADVIL/MOTRIN      Dose: 600 mg  Take 1 tablet (600 mg) by mouth every 6 hours as needed for moderate pain Start after delivery  Quantity: 60 tablet  Refills: 0     oxyCODONE 5 MG tablet  Commonly known as: ROXICODONE      Dose: 5 mg  Take 1 tablet (5 mg) by mouth every 6 hours as needed for pain  Quantity: 18 tablet  Refills: 0     simethicone 80 MG chewable tablet  Commonly known as: MYLICON      Dose: 80 mg  Take 1 tablet (80 mg) by mouth every 6 hours as needed for flatulence or cramping  Quantity: 30 tablet  Refills: 0        CONTINUE these medicines which may have CHANGED, or have new prescriptions. If we are uncertain of the size of tablets/capsules you have at home, strength may be listed as something that might have changed.      Dose / Directions   * senna-docusate 8.6-50 MG tablet  Commonly known as: SENOKOT-S/PERICOLACE  This may have changed: Another medication with the same name was added. Make sure you understand how and when to take each.      Dose: 1 tablet  Take 1 tablet by mouth daily  Refills: 0     * senna-docusate 8.6-50 MG tablet  Commonly known as: SENOKOT-S/PERICOLACE  This may have changed: You were already taking a medication with the same name, and this prescription was added. Make sure you understand how and when to take each.      Dose: 1 tablet  Take 1 tablet by mouth daily Start after delivery.  Quantity: 100 tablet  Refills: 0         * This list has 2 medication(s) that are the same as other medications prescribed for you. Read the directions carefully, and ask your doctor or other care provider to review them  with you.            CONTINUE these medicines which have NOT CHANGED      Dose / Directions   albuterol 108 (90 Base) MCG/ACT inhaler  Commonly known as: PROAIR HFA/PROVENTIL HFA/VENTOLIN HFA  Used for: Mild persistent asthma without complication      Dose: 2 puff  Inhale 2 puffs into the lungs every 4 hours as needed for shortness of breath / dyspnea  Quantity: 1 Inhaler  Refills: 1     metFORMIN 500 MG tablet  Commonly known as: GLUCOPHAGE  Used for: PCOS (polycystic ovarian syndrome)      Take 1 tablet once a day for a week, then two tablets once a day for a week, and then three tablets once a day if stomach tolerates it.  Quantity: 90 tablet  Refills: 3     montelukast 10 MG tablet  Commonly known as: SINGULAIR  Used for: Mild persistent asthma without complication      TAKE 1 TABLET(10 MG) BY MOUTH AT BEDTIME  Quantity: 90 tablet  Refills: 3     order for DME  Used for: Radial styloid tenosynovitis of right hand      Equipment being ordered: Wrist brace  Quantity: 1 each  Refills: 0     polyethylene glycol 17 g packet  Commonly known as: MIRALAX      Dose: 1 packet  Take 1 packet by mouth daily  Refills: 0     PRENATAL VITAMINS PO      Dose: 1 tablet  Take 1 tablet by mouth daily  Refills: 0        STOP taking    aspirin 81 MG EC tablet  Commonly known as: ASA              Where to get your medicines      These medications were sent to [facility] [place], [place] - [address]  [address] [address]    Phone: [phone]     acetaminophen 325 MG tablet    ibuprofen 600 MG tablet    simethicone 80 MG chewable tablet     Some of these will need a paper prescription and others can be bought over the counter. Ask your nurse if you have questions.    Bring a paper prescription for each of these medications    oxyCODONE 5 MG tablet     These medications will be mailed to you     From: [employer] [facility] [place] [address] Phone:  [phone]     ferrous sulfate 325 (65 Fe) MG tablet    senna-docusate 8.6-50 MG tablet               Consultations:   Anesthesia  NICU          Brief Admission History:   Ruby Morrison is a 35 year old,  with tri-tri triplets, who was admitted at 30w3d with concern for  labor. Cervix progressed from 4 to 5.5 cm over 2.5 hours and she was noted to be lyndsay regularly. Given progressing  labor and triplet gestation,  delivery was recommended. The risks, benefits, and alternatives of  delivery were explained and the patient agreed to proceed. She received IV magnesium for fetal neuroprotection and one does of betamethasone prior to delivery.       Intraoperative course   The procedure was uncomplicated.  EBL 1682 mL.  See operative report for details.     Operative findings:   1. Baby A: viable female infant at 1341 hours on 2018. Apgars 6,7,8 at 1,5 and 10 minutes. Weight 1440g.  Fetal presentation: cephalic. Amniotic fluid: clear.  Arterial pH 7.33, base deficit 1.7.   2. Baby B: viable male infant at 1342 hours on 2018. Apgars 7&9 at 1&5 minutes. Weight 1660 g.   Fetal presentation: arturo breech. Amniotic fluid: clear. Arterial pH 7.28, base deficit 2.4.  3. Baby C: viable male infant at 1343 hours on 2018. Apgars 6&9 at 1&5 minutes. Weight 1430g.  Fetal presentation: arturo breech. Amniotic fluid: clear. Arterial pH 7.28, base deficit 5.7.  4. All three placentas intact with 3 vessel cord.     5. Normal appearing uterus and fallopian tubes. Enlarged, polycystic appearing ovaries.   6.  No intraabdominal adhesions. No abdominal wall adhesions        Postpartum Course   The patient's hospital course was overall unremarkable. On day of discharge (10/4), she noted lightheadedness and dizziness with nausea. Hgb 7.3 - was subsequently given 2u pRBCs with some improvement in her symptoms then one more unit on the day of discharge for persistent dizziness and  nausea . On discharge, her pain was well controlled. Vaginal bleeding is similar to peak menstrual flow.  Voiding without difficulty.  Ambulating well, tolerating a normal diet, and has resumption of bowel function.  No fever or significant wound drainage.  Breastfeeding well.  Infants are stable in NICU. She was discharged on post-partum day #4.    Contraception: IUD at 6wks PP.   Rh positive, no Rhogam indicated  Rubella immune, no MMR indicated          Discharge Instructions and Follow-Up:     Discharge diet: Regular   Discharge activity: No lifting greater than 20 lbs, pushing, pulling, or other strenuous activity for 6 weeks. Pelvic rest for 6 weeks including no sexual intercourse, tampons, or douching. No driving until you can slam on the brakes without pain or while on narcotic pain medications.    Discharge follow-up: Follow up with primary OB for routine postpartum visit in 6 weeks   Wound care: Keep incision clean and dry           Discharge Disposition:     Discharged to home      Iveth Everett MD  Ob/Gyn Resident, PGY-3  Pager: [phone]  10/05/20 8:04 AM       The patient was seen and examined by me on the day of discharge.  I have reviewed and agree with the resident's above note.    Heather Bowman MD, FACOG

## 2020-10-01 NOTE — PROVIDER NOTIFICATION
10/01/20 1300   Provider Notification   Provider Name/Title Dr Burns   Method of Notification In Department   Ok to take babies off monitors to move back to OR. Physicians will be in charge of FHTs following spinal.

## 2020-10-01 NOTE — PLAN OF CARE
Pt stable throughout PACU stay. Intermittent nausea but jerad ice chips. Antiemetics given prior to visiting babies in NICU. Report to Katerin PEGUERO, who assumed care.

## 2020-10-01 NOTE — PLAN OF CARE
"Data: Patient presented to [facility] at 0953.   Reason for maternal/fetal assessment per patient is No chief complaint on file.  .  Patient is a . Prenatal record reviewed.      OB History    Para Term  AB Living   3 2 2 0 0 2   SAB TAB Ectopic Multiple Live Births   0 0 0 0 2      # Outcome Date GA Lbr Serge/2nd Weight Sex Delivery Anes PTL Lv   3 Current            2 Term 18 38w1d  2.85 kg (6 lb 4.5 oz) F Vag-Spont EPI N GARY      Name: Cata \"Aishwarya\"      Apgar1: 8  Apgar5: 9   1 Term 16 37w0d / 03:50 2.551 kg (5 lb 10 oz) F Vag-Spont EPI  GARY      Name: Chino \"Gardenia\"      Apgar1: 2  Apgar5: 6   . Medical history:   Past Medical History:   Diagnosis Date     Asthma      Cat allergies      Encounter for supervision of other normal pregnancy 2015     GERD (gastroesophageal reflux disease)      Group B Streptococcus urinary tract infection affecting pregnancy in first trimester 2017     Hx of previous reproductive problem      Hypertension     during pregnancy     Mixed hyperlipidemia 2019   . Gestational Age 30w3d. VSS. Fetal movement present x3. Patient denies cramping, vaginal discharge, UTI symptoms, bloody show, vaginal bleeding, edema, headache, visual disturbances, epigastric or URQ pain, abdominal pain, rupture of membranes. Support persons Kike present.  Action: Verbal consent for EFM. Triage assessment completed. EFM applied for cervical dilation to 4cm in clinic and tri/tri triplet gestation. Uterine assessment done- see doc flow. Fetal assessment: Presumed adequate fetal oxygenation documented (see flow record).   Response: Dr. Burns informed of arrival. Plan per provider is magnesium for neuro protection, betamethasone, and delivery if labor advances. Patient verbalized agreement with plan. Patient transferred to room 444 ambulatory, oriented to room and call light.   "

## 2020-10-01 NOTE — PROGRESS NOTES
S: not feeling any contractions  All questions and concerns about  delivery answered    O: Temp: 98.7  F (37.1  C) Temp src: Oral BP: (!) 144/94 Pulse: 110   Resp: 18        Appears well  EFM category I  TOCO Q 2-4 min; mild  Cervix deferrred    Consent for surgery, blood transfusion reviewed and signed with patient.    present for discussion.     Labs pending    A/P:   IUP at 30+3 with trichorionic triamnionic triplets in labor with advanced cervcal dilation. Receiving IVF, magnesium sulfate, and s/p first dose beta methasone    Affirmation of consent signed and all questions answered.     Abigail Burns MD

## 2020-10-01 NOTE — PROGRESS NOTES
The patient was seen for an ultrasound in the Maternal[facility]Fetal Medicine Center at the [facility] today.  For a detailed report of the ultrasound examination, please see the ultrasound report which can be found under the imaging tab.      Nhung Helm MD  Maternal-Fetal Medicine

## 2020-10-01 NOTE — CONSULTS
_       [facility]                      Neonatology Antepartum Counseling Consult      I was asked to provide antepartum counseling for Ruby Morrison at the request of Holley Cordova MD secondary to tri-tri triplet pregnancy with  labor. Ms. Morrison is currently  30 3/7 weeks and has a hx significant for chronic hypertension. Betamethasone was administered on 10/1/20 just prior to delivery. Ms. Morrison, accompanied by her , was counseled on the expected hospital course, potential risks, and outcomes associated with an infant born at approximately 30 weeks gestation. The counseling included: morbidity, mortality, initial delivery room stabilization, respiratory course, lung development, RDS, infection (including NEC), intraventricular hemorrhage, nutrition, growth and development, and long term outcomes. Please feel free to call with any additional questions or concerns.          Araceli Kay MD  Neonatology Fellow   Intensive Care Unit  [facility]

## 2020-10-01 NOTE — PLAN OF CARE
VSS. Postpartum check WDL. Pain managed with Toradol and Tylenol. Tolerating fluids and crackers. Urine output via nesbitt WDL and nesbitt care provided. Dangled at bedside at 2015; pt had some dizziness prior to dangling and reported feeling a little more dizzy afterwards. Pt was able to lay back in bed and reported feeling better after resting. Initiated pumping and reviewed use and assembly of pump. Offered to demonstrate hand expression, but pt declined. Encouraged pt to call when she is ready to be shown hand expression and to pump 8-12x/24 hours.  at bedside and supportive. Continue cares.

## 2020-10-02 LAB
GP B STREP DNA SPEC QL NAA+PROBE: POSITIVE
HGB BLD-MCNC: 7.2 G/DL (ref 11.7–15.7)
SPECIMEN SOURCE: ABNORMAL

## 2020-10-02 PROCEDURE — 120N000002 HC R&B MED SURG/OB UMMC

## 2020-10-02 PROCEDURE — 250N000013 HC RX MED GY IP 250 OP 250 PS 637: Performed by: STUDENT IN AN ORGANIZED HEALTH CARE EDUCATION/TRAINING PROGRAM

## 2020-10-02 PROCEDURE — 250N000011 HC RX IP 250 OP 636: Performed by: STUDENT IN AN ORGANIZED HEALTH CARE EDUCATION/TRAINING PROGRAM

## 2020-10-02 PROCEDURE — 99232 SBSQ HOSP IP/OBS MODERATE 35: CPT | Mod: GC | Performed by: OBSTETRICS & GYNECOLOGY

## 2020-10-02 PROCEDURE — 36415 COLL VENOUS BLD VENIPUNCTURE: CPT | Performed by: STUDENT IN AN ORGANIZED HEALTH CARE EDUCATION/TRAINING PROGRAM

## 2020-10-02 PROCEDURE — 85018 HEMOGLOBIN: CPT | Performed by: STUDENT IN AN ORGANIZED HEALTH CARE EDUCATION/TRAINING PROGRAM

## 2020-10-02 RX ADMIN — ACETAMINOPHEN 975 MG: 325 TABLET, FILM COATED ORAL at 14:06

## 2020-10-02 RX ADMIN — SIMETHICONE 80 MG: 80 TABLET, CHEWABLE ORAL at 18:13

## 2020-10-02 RX ADMIN — OXYCODONE HYDROCHLORIDE 5 MG: 5 TABLET ORAL at 18:13

## 2020-10-02 RX ADMIN — IBUPROFEN 800 MG: 800 TABLET ORAL at 11:05

## 2020-10-02 RX ADMIN — IBUPROFEN 800 MG: 800 TABLET ORAL at 17:14

## 2020-10-02 RX ADMIN — ACETAMINOPHEN 975 MG: 325 TABLET, FILM COATED ORAL at 08:07

## 2020-10-02 RX ADMIN — ACETAMINOPHEN 975 MG: 325 TABLET, FILM COATED ORAL at 01:11

## 2020-10-02 RX ADMIN — ENOXAPARIN SODIUM 40 MG: 40 INJECTION SUBCUTANEOUS at 09:40

## 2020-10-02 RX ADMIN — SENNOSIDES AND DOCUSATE SODIUM 1 TABLET: 8.6; 5 TABLET ORAL at 08:07

## 2020-10-02 RX ADMIN — IBUPROFEN 800 MG: 800 TABLET ORAL at 23:08

## 2020-10-02 RX ADMIN — OXYCODONE HYDROCHLORIDE 5 MG: 5 TABLET ORAL at 23:08

## 2020-10-02 RX ADMIN — KETOROLAC TROMETHAMINE 30 MG: 30 INJECTION, SOLUTION INTRAMUSCULAR at 05:06

## 2020-10-02 RX ADMIN — ACETAMINOPHEN 975 MG: 325 TABLET, FILM COATED ORAL at 20:12

## 2020-10-02 RX ADMIN — DOCUSATE SODIUM AND SENNOSIDES 2 TABLET: 8.6; 5 TABLET ORAL at 20:13

## 2020-10-02 NOTE — CONSULTS
[facility]  MATERNAL CHILD HEALTH   INITIAL NICU PSYCHOSOCIAL ASSESSMENT     DATA:     Presenting Information: Mom is a  who delivered triplets (M/M/F) on 10/1/2020 at 30w3d gestation in the setting of  labor. Babies were admitted to the NICU for prematurity.  SW was consulted to meet with this family per NICU admission of infant.     Living Situation: Parents are Ruby and Kike (), who live together in [place], along with their two daughters, ages 4 and 2.    Social Support: Family has good support.    Education/Employment: Parents have no concerns about taking time off to be present to their family during this hospitalization. Of note, Dad has 4 month paternity leave.    Insurance: [employer]    Source of Financial Support: Employment    Mental Health History: None indicated.    History of Postpartum Mood Disorders: None.    Chemical Health History: None.    Legal/Child Protection Involvement: None indicated.    INTERVENTION:       Chart review    Collaboration with team: MARIELENA Blank    Conducted Psychosocial Assessment    Introduction to Maternal Child Health SW role and scope of practice    Orientation to the NICU (parking, lodging, meals, visitation)    Validated emotions and provided supportive listening    Provided resources and referrals    Monthly parking    Provided psychoeducation on  mood disorders and indicated that SW would continue to monitor mood and support bridging to mental health resources as needed.    Provided SW contact info    ASSESSMENT:     Coping: Parents appear to be coping well with this hospitalization. They had prepared for the likelihood of a NICU stay and have good supports in place. Parents are engaged, appropriate, and thoughtful in their conversations with one another and with SW.    Assessment of parental risk for PMAD: Moderate.    Risk Factors: Hospitalization during global pandemic.    Resiliency  Factors & Strengths: Local family, good support, demonstrated strong coping, experienced NICU parents, financial security.    PLAN:     SW will continue to follow for supportive intervention.    JENNIFER Pino, SW  Clinical   Maternal Child Health  [facility]  Direct: [phone]  Pager: [phone]  After Hours SW: [phone]

## 2020-10-02 NOTE — LACTATION NOTE
This note was copied from a baby's chart.  D:  I met with Ruby in her postpartum room today.  She has two older children she  for 3 months and lost her supply after going back to work.  She has asthma and uses albuterol PRN, also takes Singulair.  She has no history of breast/chest surgery or trauma, has already started to pump.  I:  I gave her a folder of introductory materials and went over pumping guidelines.    We talked about hands on pumping techniques, hand expression and how to access the [place] websites. I advised her to call her insurance company about pump coverage.   A:  Experienced mom, pumping for Johanna, Sheila and Petr.  P:  Will continue to provide lactation support.      Kim Chairez, RNC, IBCLC

## 2020-10-02 NOTE — PLAN OF CARE
Patient is resting comfortably in bed and claimed  pain is well controlled with current pain regimen. She's voiding spontaneously and passing flatus. Encouraged to ambulate in the room as tolerated and to increase fluid intake. Will continue with plan of care and assist as needed.

## 2020-10-02 NOTE — PLAN OF CARE
VSS. Postpartum assessment  WNL- see flow sheet. Scant lochia rubra. Fundus firm, midline at U/1. Incision dressing is clean, dry and intact. Pain managed on tylenol and ibuprofen, oxycodone available. Patient .Urinary catheter patent. Output is adequate. Fluids promoted. D5LR currently infusing at 125ml/Hr. Patient denying dizziness while in bed. Patient using breast pump independently.  is at the bedside. Patient and  bonding well with baby. Will continue to monitor and provide support as needed.

## 2020-10-02 NOTE — PLAN OF CARE
Vital sign stable and postpartum checks within normal limits. The nesbitt was discontinued this morning and pt has voided twice. Pt was up with staff standing by to the bathroom and pt did  well with no complain of being dizzy. Pt is pumping and getting drops of colostrum. Pt went down to NICU to visit the babies. Complain dull incisional pain. Pt medicated with Ibuprofen and Tylenol for pain control. Encouraged pt to empty bladder every 2-3 hours. Continue cares.

## 2020-10-02 NOTE — PROGRESS NOTES
[facility]   Postpartum Note    Name:  Ruby Morrison  MRN: [id number]    S: Patient doing okay, just woke up. Pain controlled. Tolerating some sips of water and crackers, looking forward to breakfast. Has not yet ambulated. Ardon still in place. Not yet passing. Lochia just starting.  Planning to pump for babies in NICU. Undecided for postpartum contraception.     O:   Patient Vitals for the past 24 hrs:   BP Temp Temp src Pulse Resp SpO2   10/02/20 0519 108/70 97.7  F (36.5  C) Oral 84 16 97 %   10/02/20 0112 110/62 97.8  F (36.6  C) Oral 92 16 98 %   10/01/20 2200 115/77 97.8  F (36.6  C) Oral 84 16 97 %   10/01/20 2058 120/79 97.8  F (36.6  C) Oral 80 16 95 %   10/01/20 2000 122/79 98  F (36.7  C) Oral 85 16 97 %   10/01/20 1900 129/86 98  F (36.7  C) Oral 80 16 96 %   10/01/20 1755 122/86 -- Oral 76 16 98 %   10/01/20 1725 115/76 97.7  F (36.5  C) Oral 78 16 96 %   10/01/20 1645 128/79 97.9  F (36.6  C) Oral 77 14 100 %   10/01/20 1628 116/78 -- -- 83 15 100 %   10/01/20 1612 124/78 -- -- 78 15 100 %   10/01/20 1558 -- -- -- 84 18 97 %   10/01/20 1545 121/76 -- -- 82 16 100 %   10/01/20 1535 120/77 -- -- 84 16 98 %   10/01/20 1520 129/74 -- -- 93 16 97 %   10/01/20 1505 130/80 -- -- 103 27 96 %   10/01/20 1445 128/81 97.8  F (36.6  C) Oral -- -- 96 %   10/01/20 1235 136/81 -- -- -- 18 --   10/01/20 1157 104/55 -- -- -- 16 --   10/01/20 1150 119/70 -- -- -- 18 --   10/01/20 1145 (!) 140/95 -- -- -- 20 --   10/01/20 1140 133/87 -- -- -- 18 --   10/01/20 1135 (!) 140/98 -- -- -- 20 --   10/01/20 1130 137/85 -- -- -- 18 --   10/01/20 1125 136/84 -- -- -- 16 --   10/01/20 1120 -- -- -- -- 18 --   10/01/20 1044 (!) 144/94 98.7  F (37.1  C) Oral 110 18 --     Gen:  Resting comfortably, NAD  CV:  Regular rate, well perfused  Pulm:  Breathing room air comfortably  Abd:  Soft, appropriately ttp, non-distended. Fundus at umbilicus, firm and non-tender.  Incision: C/d/i, no surrounding redness  or erythema with sterile dressing in place  Ext:  Non-tender, 3+ LE edema b/l with SCDs in place    I/O last 3 completed shifts:  In: 1020 [P.O.:420; I.V.:600]  Out: 2372 [Urine:680; Blood:1692]    Hgb:   Hemoglobin   Date Value Ref Range Status   10/01/2020 8.8 (L) 11.7 - 15.7 g/dL Final       Assessment/Plan:  35 year old  on POD#1 s/p PLTCS for  labor and triplets. Recovering slowly postpartum.    Continue with routine postpartum care:  Pain: Well-controlled with Tylenol, Toradol, and oxycodone prn. Transition to ibuprofen today  Hgb: 9.9> QBL 1692> 8.8> AM Hgb pending. Patient is asymptomatic from acute blood loss anemia and vitals are reassuring. Will discharge home with PO iron  Rh: positive  Rubella: immune  Feed: pumping  BC: undecided    Asthma:  - Continue home Singulair and albuterol prn    Preeclampsia without severe features:  - Blood pressure normal range since delivery, continue to monitor  - HELLP labs normal on admission, repeat prn for severe range BPs  - Net -1700mL since admission, weight pending    Dispo: DC likely POD#2-3 when meeting postoperative goals    Iveth Everett MD  Ob/Gyn Resident, PGY-3  Pager: [phone]  10/02/20 8:14 AM

## 2020-10-03 LAB — HGB BLD-MCNC: 7.3 G/DL (ref 11.7–15.7)

## 2020-10-03 PROCEDURE — 99232 SBSQ HOSP IP/OBS MODERATE 35: CPT | Mod: GC | Performed by: OBSTETRICS & GYNECOLOGY

## 2020-10-03 PROCEDURE — 250N000011 HC RX IP 250 OP 636: Performed by: STUDENT IN AN ORGANIZED HEALTH CARE EDUCATION/TRAINING PROGRAM

## 2020-10-03 PROCEDURE — 85018 HEMOGLOBIN: CPT | Performed by: STUDENT IN AN ORGANIZED HEALTH CARE EDUCATION/TRAINING PROGRAM

## 2020-10-03 PROCEDURE — 36415 COLL VENOUS BLD VENIPUNCTURE: CPT | Performed by: STUDENT IN AN ORGANIZED HEALTH CARE EDUCATION/TRAINING PROGRAM

## 2020-10-03 PROCEDURE — 120N000002 HC R&B MED SURG/OB UMMC

## 2020-10-03 PROCEDURE — 250N000013 HC RX MED GY IP 250 OP 250 PS 637: Performed by: STUDENT IN AN ORGANIZED HEALTH CARE EDUCATION/TRAINING PROGRAM

## 2020-10-03 RX ADMIN — DOCUSATE SODIUM AND SENNOSIDES 2 TABLET: 8.6; 5 TABLET ORAL at 08:10

## 2020-10-03 RX ADMIN — ACETAMINOPHEN 975 MG: 325 TABLET, FILM COATED ORAL at 02:14

## 2020-10-03 RX ADMIN — IBUPROFEN 800 MG: 800 TABLET ORAL at 17:49

## 2020-10-03 RX ADMIN — OXYCODONE HYDROCHLORIDE 5 MG: 5 TABLET ORAL at 08:10

## 2020-10-03 RX ADMIN — IBUPROFEN 800 MG: 800 TABLET ORAL at 04:47

## 2020-10-03 RX ADMIN — ACETAMINOPHEN 975 MG: 325 TABLET, FILM COATED ORAL at 08:10

## 2020-10-03 RX ADMIN — OXYCODONE HYDROCHLORIDE 5 MG: 5 TABLET ORAL at 14:16

## 2020-10-03 RX ADMIN — DOCUSATE SODIUM AND SENNOSIDES 1 TABLET: 8.6; 5 TABLET ORAL at 19:57

## 2020-10-03 RX ADMIN — ENOXAPARIN SODIUM 40 MG: 40 INJECTION SUBCUTANEOUS at 09:51

## 2020-10-03 RX ADMIN — SIMETHICONE 80 MG: 80 TABLET, CHEWABLE ORAL at 19:57

## 2020-10-03 RX ADMIN — OXYCODONE HYDROCHLORIDE 5 MG: 5 TABLET ORAL at 19:57

## 2020-10-03 RX ADMIN — ACETAMINOPHEN 975 MG: 325 TABLET, FILM COATED ORAL at 19:57

## 2020-10-03 RX ADMIN — ACETAMINOPHEN 975 MG: 325 TABLET, FILM COATED ORAL at 14:16

## 2020-10-03 RX ADMIN — IBUPROFEN 800 MG: 800 TABLET ORAL at 10:50

## 2020-10-03 NOTE — PROGRESS NOTES
Post Partum Progress Note  POD#2    Subjective:  She is resting comfortably in bed this morning.Pain is improving and well controlled on current medication regimen. She is tolerating PO intake. Lochia present and minimal.  She is voiding without difficulty. She has yet to pass flatus or have a BM. She is ambulating without dizziness or difficulty.  She denies headache, changes in vision, nausea/vomiting, chest pain, shortness of breath, RUQ pain, or worsening edema.  Plans to breast feed/pump.    Objective:  Vitals:    10/02/20 1724 10/02/20 2000 10/02/20 2305 10/03/20 0809   BP: 128/83 138/84 138/80 132/82   Pulse: 92 107 96 108   Resp: 16 18 17 16   Temp: 98.3  F (36.8  C) 98  F (36.7  C) 97.8  F (36.6  C) 98.2  F (36.8  C)   TempSrc: Oral Oral Oral Oral   SpO2:           General: NAD, resting comfortably  CV: Regular rate, well perfused.   Pulm: Normal respiratory effort.  Abd: Soft, non-tender, non-distended. Fundus is firm below the umbilicus.    Incision: incision is clean, dry, intact  Ext: Trace lower extremity edema bilaterally. No calf tenderness.      Intake/Output Summary (Last 24 hours) at 10/3/2020 1039  Last data filed at 10/2/2020 1800  Gross per 24 hour   Intake --   Output 600 ml   Net -600 ml       Assessment/Plan:  Ruby Morrison is a 35 year old  on POD#2 s/p PLTCS for  labor and triplets. Recovering well post-operatively: ambulating without dizziness or lightheadedness, tolerating PO intake without nausea or vomiting. Has yet to have BM.     Routine postpartum care  Pain:     Well-controlled with Tylenol, Toradol, and oxycodone prn. Transition to ibuprofen today  Hgb:      9.9> QBL 1692> 8.8> AM Hgb pending. Patient is asymptomatic from acute blood loss anemia and vitals are reassuring. Will discharge home with PO iron  Rh:        positive  Rubella: immune  Feed:    pumping  BC:       IUD at 6wks PP     Asthma:  - Continue home Singulair and albuterol prn     Preeclampsia  without severe features  - Blood pressure normal range since delivery, continue to monitor  - HELLP labs normal on admission, repeat prn for severe range BPs  - UOP adequate     Discharge to home on POD#2 pending patient continues to meet all post-operative goals.     Kim Dent MD  OB/GYN PGY-1  10/03/20 9:18 AM

## 2020-10-03 NOTE — PLAN OF CARE
Vital signs and postpartum checked within normal range. Pt is sitting up in the reclined chair most of the time, ambulating to NICU and  independent with her cares. There was some red blood on the towel from the incision site after pt came out from the shower. Inter cloth was applied to the incision site after that. Pt is pumping. Pain is well managed with Ibuprofen, Tylenol and Roxicodone. Encouraged pt to air the incisional site when in bed. Continue to monitor.

## 2020-10-03 NOTE — PLAN OF CARE
Patient vital signs and postpartum assessment within normal limits. Pain managed with tylenol and ibuprofen. Up ambulating. Voiding and tolerating a regular diet. Continue with plan of care

## 2020-10-04 LAB
BLD PROD TYP BPU: NORMAL
BLD UNIT ID BPU: 0
BLOOD PRODUCT CODE: NORMAL
BPU ID: NORMAL
TRANSFUSION STATUS PATIENT QL: NORMAL
TRANSFUSION STATUS PATIENT QL: NORMAL

## 2020-10-04 PROCEDURE — 250N000013 HC RX MED GY IP 250 OP 250 PS 637: Performed by: STUDENT IN AN ORGANIZED HEALTH CARE EDUCATION/TRAINING PROGRAM

## 2020-10-04 PROCEDURE — 120N000002 HC R&B MED SURG/OB UMMC

## 2020-10-04 PROCEDURE — 99232 SBSQ HOSP IP/OBS MODERATE 35: CPT | Mod: GC | Performed by: OBSTETRICS & GYNECOLOGY

## 2020-10-04 PROCEDURE — P9016 RBC LEUKOCYTES REDUCED: HCPCS | Performed by: STUDENT IN AN ORGANIZED HEALTH CARE EDUCATION/TRAINING PROGRAM

## 2020-10-04 PROCEDURE — 250N000011 HC RX IP 250 OP 636: Performed by: STUDENT IN AN ORGANIZED HEALTH CARE EDUCATION/TRAINING PROGRAM

## 2020-10-04 PROCEDURE — 250N000011 HC RX IP 250 OP 636: Performed by: OBSTETRICS & GYNECOLOGY

## 2020-10-04 RX ORDER — ACETAMINOPHEN 325 MG/1
325 TABLET ORAL EVERY 4 HOURS PRN
Status: DISCONTINUED | OUTPATIENT
Start: 2020-10-04 | End: 2020-10-06 | Stop reason: HOSPADM

## 2020-10-04 RX ORDER — AMOXICILLIN 250 MG
1 CAPSULE ORAL DAILY
Qty: 100 TABLET | Refills: 0 | Status: SHIPPED | OUTPATIENT
Start: 2020-10-04 | End: 2020-10-05

## 2020-10-04 RX ORDER — ACETAMINOPHEN 325 MG/1
650 TABLET ORAL EVERY 6 HOURS PRN
Qty: 100 TABLET | Refills: 0 | Status: SHIPPED | OUTPATIENT
Start: 2020-10-04 | End: 2020-12-10

## 2020-10-04 RX ORDER — OXYCODONE HYDROCHLORIDE 5 MG/1
5 TABLET ORAL EVERY 6 HOURS PRN
Qty: 18 TABLET | Refills: 0 | Status: SHIPPED | OUTPATIENT
Start: 2020-10-04 | End: 2020-10-04

## 2020-10-04 RX ORDER — ONDANSETRON 4 MG/1
4 TABLET, ORALLY DISINTEGRATING ORAL EVERY 6 HOURS PRN
Status: DISCONTINUED | OUTPATIENT
Start: 2020-10-04 | End: 2020-10-06 | Stop reason: HOSPADM

## 2020-10-04 RX ORDER — IBUPROFEN 600 MG/1
600 TABLET, FILM COATED ORAL EVERY 6 HOURS PRN
Qty: 60 TABLET | Refills: 0 | Status: SHIPPED | OUTPATIENT
Start: 2020-10-04 | End: 2020-10-04

## 2020-10-04 RX ORDER — OXYCODONE HYDROCHLORIDE 5 MG/1
5 TABLET ORAL EVERY 6 HOURS PRN
Qty: 18 TABLET | Refills: 0 | Status: SHIPPED | OUTPATIENT
Start: 2020-10-04 | End: 2020-12-10

## 2020-10-04 RX ORDER — IBUPROFEN 600 MG/1
600 TABLET, FILM COATED ORAL EVERY 6 HOURS PRN
Qty: 60 TABLET | Refills: 0 | Status: SHIPPED | OUTPATIENT
Start: 2020-10-04 | End: 2020-12-10

## 2020-10-04 RX ORDER — ACETAMINOPHEN 325 MG/1
650 TABLET ORAL EVERY 6 HOURS PRN
Qty: 100 TABLET | Refills: 0 | Status: SHIPPED | OUTPATIENT
Start: 2020-10-04 | End: 2020-10-04

## 2020-10-04 RX ORDER — FERROUS SULFATE 325(65) MG
325 TABLET ORAL
Qty: 30 TABLET | Refills: 1 | Status: SHIPPED | OUTPATIENT
Start: 2020-10-04 | End: 2020-10-05

## 2020-10-04 RX ADMIN — SIMETHICONE 80 MG: 80 TABLET, CHEWABLE ORAL at 15:56

## 2020-10-04 RX ADMIN — IBUPROFEN 800 MG: 800 TABLET ORAL at 08:29

## 2020-10-04 RX ADMIN — OXYCODONE HYDROCHLORIDE 5 MG: 5 TABLET ORAL at 23:29

## 2020-10-04 RX ADMIN — ACETAMINOPHEN 975 MG: 325 TABLET, FILM COATED ORAL at 10:42

## 2020-10-04 RX ADMIN — OXYCODONE HYDROCHLORIDE 5 MG: 5 TABLET ORAL at 19:41

## 2020-10-04 RX ADMIN — ACETAMINOPHEN 975 MG: 325 TABLET, FILM COATED ORAL at 04:49

## 2020-10-04 RX ADMIN — ENOXAPARIN SODIUM 40 MG: 40 INJECTION SUBCUTANEOUS at 10:03

## 2020-10-04 RX ADMIN — ONDANSETRON 4 MG: 4 TABLET, ORALLY DISINTEGRATING ORAL at 10:42

## 2020-10-04 RX ADMIN — ACETAMINOPHEN 975 MG: 325 TABLET, FILM COATED ORAL at 17:29

## 2020-10-04 RX ADMIN — IBUPROFEN 800 MG: 800 TABLET ORAL at 20:59

## 2020-10-04 RX ADMIN — IBUPROFEN 800 MG: 800 TABLET ORAL at 14:50

## 2020-10-04 RX ADMIN — DOCUSATE SODIUM AND SENNOSIDES 2 TABLET: 8.6; 5 TABLET ORAL at 08:29

## 2020-10-04 RX ADMIN — IBUPROFEN 800 MG: 800 TABLET ORAL at 00:48

## 2020-10-04 RX ADMIN — ACETAMINOPHEN 975 MG: 325 TABLET, FILM COATED ORAL at 23:29

## 2020-10-04 RX ADMIN — OXYCODONE HYDROCHLORIDE 5 MG: 5 TABLET ORAL at 14:50

## 2020-10-04 RX ADMIN — DOCUSATE SODIUM AND SENNOSIDES 2 TABLET: 8.6; 5 TABLET ORAL at 19:41

## 2020-10-04 RX ADMIN — OXYCODONE HYDROCHLORIDE 5 MG: 5 TABLET ORAL at 00:48

## 2020-10-04 RX ADMIN — OXYCODONE HYDROCHLORIDE 5 MG: 5 TABLET ORAL at 04:49

## 2020-10-04 NOTE — LACTATION NOTE
This note was copied from a baby's chart.  D/I: Ruby is discharging from [facility] today. I dispensed a [employer] pump rental. She will verify her insurance coverage tomorrow and will direct how it is to be billed. She has two older [employer] pumps from previous at home. She is getting small puddles every 3 hours. I gave her pump parts to keep at bedside as well as [facility] information and steam bags for pump kit care.  A: Mom is pumping per recommendations and has equipment and information she needs to establish her supply.  P: Will continue to provide lactation support.   Zunilda Fish, RNC, IBCLC

## 2020-10-04 NOTE — PLAN OF CARE
Postpartum assessment WNLs. VSS. Incision pain managed with Ibuprofen, Tylenol and Oxycodone 5 mg X 2. Incision CDI. Abdominal binder on.  Fundus firm and midline. Lochia scant. Tolerating activity. Last BM on 10/3. Up ad augustin, independent with ADLs. Denies nausea, headache, dizziness, lightheadedness, chest pain or SOB.  Stable post C section patient, appears to be sleeping during rounds. Continue with plan of care.

## 2020-10-04 NOTE — PLAN OF CARE
Vital sign within normal range. Pt was complaining this morning about nausea, light headache and mild dizziness when standing, but not when she was ambulating. Pt medicated with Zofran for nausea. Tylenol, Ibuprofen and Roxicodone for pain. MD was notified about pt's condition and the resident came up to assess pt. Blood  transfusion was ordered, but the transfusion was delayed due to no IV access. Discharge instructions was reviewed with pt before the new order, but pt still needs discharge medications to go home. Continue to monitor.

## 2020-10-04 NOTE — PROVIDER NOTIFICATION
MD will be up on the floor to assess pt.     10/04/20 1020   Provider Notification   Provider Name/Title Dr. Dent

## 2020-10-04 NOTE — PLAN OF CARE
BP slightly elevated during PRBC transfusion to 140's/80's. Otherwise VSS. Incisional pain well controlled with Tylenol, Ibuprofen and PRN Oxycodone. Pt denies nausea this evening and tolerating regular diet. Ambulating without dizziness but states she feels dizziness when sitting still or standing. 1 unit PRBC's transfused without issues. Pt tolerating PO fluids, will hold off on IV fluid bolus at this time due to pt taking adequate fluid intake.

## 2020-10-04 NOTE — PROGRESS NOTES
OB Postpartum Progress Note    S: She is resting comfortably in bed this morning. Pain is improving and well controlled on current medication regimen. She is tolerating PO intake. Bleeding present and minimal. She is voiding without difficulty. Passed flatus. She is ambulating without dizziness or difficulty.  She denies headache, changes in vision, nausea/vomiting, chest pain, shortness of breath, RUQ pain, or worsening edema.  Plans to breast feed/pump.    O:  /84 (BP Location: Right arm)   Pulse 91   Temp 98  F (36.7  C) (Oral)   Resp 18   SpO2 98%   Breastfeeding Unknown      General: NAD, resting comfortably  CV: Regular rate, well perfused.   Pulm: Normal respiratory effort.  Abd: Soft, non-tender, non-distended. Fundus is firm below the umbilicus.    Incision: incision is clean, dry, intact  Ext: Trace lower extremity edema bilaterally. No calf tenderness.    Labs:   Hemoglobin   Date Value Ref Range Status   10/03/2020 7.3 (L) 11.7 - 15.7 g/dL Final   10/02/2020 7.2 (L) 11.7 - 15.7 g/dL Final       Assessment/Plan:  Ruby Morrison is a 35 year old  on POD#3 s/p PLTCS for  labor and triplets. Recovering well post-operatively: ambulating without dizziness or lightheadedness, tolerating PO intake without nausea or vomiting. Passed flatus.     Routine postpartum care  Pain:     Well-controlled with Tylenol, Toradol, and oxycodone prn. Transition to ibuprofen today  Hgb:      9.9> QBL 1692> 8.8> 7.3. Patient is asymptomatic from acute blood loss anemia and vitals are reassuring. Will discharge home with PO iron  Rh:        positive  Rubella: immune  Feed:    pumping  BC:       IUD at 6wks PP     Asthma:  - Continue home Singulair and albuterol prn     Preeclampsia without severe features  - Blood pressure normal range since delivery, continue to monitor  - HELLP labs normal on admission, repeat prn for severe range BPs  - UOP adequate     Anticipate discharge POD#3, today; as she is  meeting postpartum discharge goals     Kim Dent MD  OB/GYN PGY-1  10/04/20 7:27 AM    I saw and evaluated the patient. I agree with the   findings and the plan of care as documented in the resident's note.   Pt is currently feeling nauseated. Aramis herrera later today. MD Abdirahman

## 2020-10-04 NOTE — PLAN OF CARE
Patient stable this evening, POD2. VSS. Spent several hours in NICU holding the triplets. Is using breastpump, small drops of colostrum. Pain well controlled with scheduled ibu/tylenol and nam PRN. Ambulating in room and hallway without dizziness or SOB. Interdry covering incision, scant serosanguinous discharge, using abdominal binder for comfort. Had first post-op BM this evening. Planning to discharge home tomorrow.

## 2020-10-05 VITALS
DIASTOLIC BLOOD PRESSURE: 87 MMHG | RESPIRATION RATE: 18 BRPM | OXYGEN SATURATION: 99 % | TEMPERATURE: 98.3 F | SYSTOLIC BLOOD PRESSURE: 138 MMHG | HEART RATE: 90 BPM

## 2020-10-05 LAB
ABO + RH BLD: NORMAL
ABO + RH BLD: NORMAL
BACTERIA SPEC CULT: ABNORMAL
BLD GP AB SCN SERPL QL: NORMAL
BLD PROD TYP BPU: NORMAL
BLD UNIT ID BPU: 0
BLD UNIT ID BPU: 0
BLOOD BANK CMNT PATIENT-IMP: NORMAL
BLOOD PRODUCT CODE: NORMAL
BLOOD PRODUCT CODE: NORMAL
BPU ID: NORMAL
BPU ID: NORMAL
HGB BLD-MCNC: 7.7 G/DL (ref 11.7–15.7)
NUM BPU REQUESTED: 1
SPECIMEN EXP DATE BLD: NORMAL
SPECIMEN SOURCE: ABNORMAL
TRANSFUSION STATUS PATIENT QL: NORMAL

## 2020-10-05 PROCEDURE — 86900 BLOOD TYPING SEROLOGIC ABO: CPT | Performed by: STUDENT IN AN ORGANIZED HEALTH CARE EDUCATION/TRAINING PROGRAM

## 2020-10-05 PROCEDURE — 250N000011 HC RX IP 250 OP 636: Performed by: STUDENT IN AN ORGANIZED HEALTH CARE EDUCATION/TRAINING PROGRAM

## 2020-10-05 PROCEDURE — 86850 RBC ANTIBODY SCREEN: CPT | Performed by: STUDENT IN AN ORGANIZED HEALTH CARE EDUCATION/TRAINING PROGRAM

## 2020-10-05 PROCEDURE — 250N000013 HC RX MED GY IP 250 OP 250 PS 637: Performed by: OBSTETRICS & GYNECOLOGY

## 2020-10-05 PROCEDURE — 250N000013 HC RX MED GY IP 250 OP 250 PS 637: Performed by: STUDENT IN AN ORGANIZED HEALTH CARE EDUCATION/TRAINING PROGRAM

## 2020-10-05 PROCEDURE — 250N000011 HC RX IP 250 OP 636: Performed by: OBSTETRICS & GYNECOLOGY

## 2020-10-05 PROCEDURE — 36415 COLL VENOUS BLD VENIPUNCTURE: CPT | Performed by: STUDENT IN AN ORGANIZED HEALTH CARE EDUCATION/TRAINING PROGRAM

## 2020-10-05 PROCEDURE — 86923 COMPATIBILITY TEST ELECTRIC: CPT | Performed by: STUDENT IN AN ORGANIZED HEALTH CARE EDUCATION/TRAINING PROGRAM

## 2020-10-05 PROCEDURE — 85018 HEMOGLOBIN: CPT | Performed by: STUDENT IN AN ORGANIZED HEALTH CARE EDUCATION/TRAINING PROGRAM

## 2020-10-05 PROCEDURE — P9016 RBC LEUKOCYTES REDUCED: HCPCS | Performed by: STUDENT IN AN ORGANIZED HEALTH CARE EDUCATION/TRAINING PROGRAM

## 2020-10-05 PROCEDURE — 99238 HOSP IP/OBS DSCHRG MGMT 30/<: CPT | Mod: GC | Performed by: OBSTETRICS & GYNECOLOGY

## 2020-10-05 PROCEDURE — 86901 BLOOD TYPING SEROLOGIC RH(D): CPT | Performed by: STUDENT IN AN ORGANIZED HEALTH CARE EDUCATION/TRAINING PROGRAM

## 2020-10-05 RX ORDER — FERROUS SULFATE 325(65) MG
325 TABLET ORAL
Qty: 30 TABLET | Refills: 1 | Status: SHIPPED | OUTPATIENT
Start: 2020-10-05 | End: 2020-12-10

## 2020-10-05 RX ORDER — ONDANSETRON 4 MG/1
4 TABLET, FILM COATED ORAL EVERY 8 HOURS PRN
Qty: 15 TABLET | Refills: 1 | Status: SHIPPED | OUTPATIENT
Start: 2020-10-05 | End: 2020-12-10

## 2020-10-05 RX ORDER — FUROSEMIDE 20 MG/1
20 TABLET ORAL DAILY
Status: DISCONTINUED | OUTPATIENT
Start: 2020-10-05 | End: 2020-10-06 | Stop reason: HOSPADM

## 2020-10-05 RX ORDER — SIMETHICONE 80 MG
80 TABLET,CHEWABLE ORAL EVERY 6 HOURS PRN
Qty: 30 TABLET | Refills: 0 | Status: SHIPPED | OUTPATIENT
Start: 2020-10-05 | End: 2020-12-10

## 2020-10-05 RX ORDER — AMOXICILLIN 250 MG
1 CAPSULE ORAL 2 TIMES DAILY PRN
Qty: 100 TABLET | Refills: 0 | Status: SHIPPED | OUTPATIENT
Start: 2020-10-05 | End: 2020-12-10

## 2020-10-05 RX ADMIN — ONDANSETRON 4 MG: 4 TABLET, ORALLY DISINTEGRATING ORAL at 13:21

## 2020-10-05 RX ADMIN — OXYCODONE HYDROCHLORIDE 5 MG: 5 TABLET ORAL at 03:30

## 2020-10-05 RX ADMIN — OXYCODONE HYDROCHLORIDE 5 MG: 5 TABLET ORAL at 17:30

## 2020-10-05 RX ADMIN — DOCUSATE SODIUM AND SENNOSIDES 2 TABLET: 8.6; 5 TABLET ORAL at 19:38

## 2020-10-05 RX ADMIN — ENOXAPARIN SODIUM 40 MG: 40 INJECTION SUBCUTANEOUS at 09:11

## 2020-10-05 RX ADMIN — ACETAMINOPHEN 975 MG: 325 TABLET, FILM COATED ORAL at 13:21

## 2020-10-05 RX ADMIN — Medication 20 MG: at 17:30

## 2020-10-05 RX ADMIN — SENNOSIDES AND DOCUSATE SODIUM 1 TABLET: 8.6; 5 TABLET ORAL at 09:23

## 2020-10-05 RX ADMIN — SIMETHICONE 80 MG: 80 TABLET, CHEWABLE ORAL at 09:23

## 2020-10-05 RX ADMIN — ACETAMINOPHEN 975 MG: 325 TABLET, FILM COATED ORAL at 05:57

## 2020-10-05 RX ADMIN — IBUPROFEN 800 MG: 800 TABLET ORAL at 15:53

## 2020-10-05 RX ADMIN — ACETAMINOPHEN 975 MG: 325 TABLET, FILM COATED ORAL at 19:38

## 2020-10-05 RX ADMIN — IBUPROFEN 800 MG: 800 TABLET ORAL at 02:52

## 2020-10-05 RX ADMIN — SIMETHICONE 80 MG: 80 TABLET, CHEWABLE ORAL at 17:35

## 2020-10-05 RX ADMIN — IBUPROFEN 800 MG: 800 TABLET ORAL at 09:11

## 2020-10-05 NOTE — PLAN OF CARE
Data: Vital signs and postpartum checks WDL  Patient eating and drinking normally. Patient able to empty bladder independently and is up ambulating.  Patient performing self cares and is able to visit infant for NICU. Pumping independently. Pt feels better after blood transfusion  Action: Patient medicated during the shift for pain with Tylenol, Oxycodone and Ibuprofen with relief after 1 hour. Patient education done see education record.  Response:  Support persons  present.    Plan: Continue with the plan of care

## 2020-10-05 NOTE — PROGRESS NOTES
OB Postpartum Progress Note    S: Ruby is feeling well this AM. Pain is well controlled on current medication regimen. She is tolerating a regular diet. Bleeding minimal. She is voiding without difficulty. Passed flatus. She is ambulating without dizziness or difficulty.  She denies headache, changes in vision, nausea/vomiting, chest pain, shortness of breath, RUQ pain, or worsening edema. Breastfeeding and pumping.    O:  Patient Vitals for the past 24 hrs:   BP Temp Temp src Pulse Resp SpO2   10/04/20 2329 136/81 97.9  F (36.6  C) Oral 88 18 98 %   10/04/20 1941 139/81 98.2  F (36.8  C) Oral 95 17 100 %   10/04/20 1845 138/79 98.1  F (36.7  C) Oral 92 18 98 %   10/04/20 1800 (!) 144/81 98.2  F (36.8  C) Oral 88 18 97 %   10/04/20 1700 (!) 143/88 98.3  F (36.8  C) Oral 93 18 97 %   10/04/20 1600 130/74 98.1  F (36.7  C) Oral 95 18 96 %   10/04/20 1540 139/78 98.2  F (36.8  C) Oral 102 18 95 %   10/04/20 1206 132/83 98.2  F (36.8  C) Oral 98 18 --   10/04/20 0822 132/85 98.3  F (36.8  C) Oral 85 18 --      General: NAD, resting comfortably  CV: Regular rate, well perfused.   Pulm: Normal respiratory effort.  Abd: Soft, non-tender, non-distended. Fundus is firm below the umbilicus.    Incision: clean, dry, intact, steri strips intact  Ext: Trace lower extremity edema bilaterally. No calf tenderness.    Labs:   Hemoglobin   Date Value Ref Range Status   10/05/2020 7.7 (L) 11.7 - 15.7 g/dL Final   10/03/2020 7.3 (L) 11.7 - 15.7 g/dL Final     Assessment/Plan:  Ruby Morrison is a 35 year old  on POD#4 s/p PLTCS for  labor and triplets. Recovering well post-operatively: ambulating without dizziness or lightheadedness, tolerating PO intake without nausea or vomiting. Passed flatus.     Routine postpartum care  Pain:     Well-controlled with Tylenol, ibuprofen, and oxycodone prn.  Hgb:      9.9> QBL 1692> 8.8> 7.2> 7.3> 1u pRBCs> 7.7. Hgb stable. Patient's symptoms from acute blood loss anemia are  improving and vitals are reassuring. Will discharge home with PO iron  Rh:        positive  Rubella: immune  Feed:    pumping  BC:       IUD at 6wks PP     Asthma:  - Continue home Singulair and albuterol prn     Preeclampsia without severe features  - Blood pressure normal to mild range since delivery, continue to monitor  - HELLP labs normal on admission, repeat prn for severe range BPs  - UOP adequate     Anticipate discharge today    Iveth Everett MD  Ob/Gyn Resident, PGY-3  Pager: [phone]  10/05/20 8:03 AM     /81   Pulse 88   Temp 97.9  F (36.6  C) (Oral)   Resp 18   SpO2 98%   Breastfeeding Unknown   Hemoglobin   Date Value Ref Range Status   10/05/2020 7.7 (L) 11.7 - 15.7 g/dL Final       The patient was seen and examined by me separately from the team.  I have reviewed and agree with the above note. She had been feeling much better, but her nausea and dizziness started again this am after she got out of bed.  She is going to try some zofran then try to eat.  She is hoping to go home later today.  Encouraged PO hydration, getting up slowly, but if her symptoms persist she would get another unit or PRBC.  Continue to monitor symptoms this morning and reassess for discharge later today.    Heather Bowman MD, FACOG

## 2020-10-05 NOTE — PLAN OF CARE
Problem: Bleeding (Postpartum  Delivery)  Goal: Hemostasis  Outcome: Declining  Reports no improvement in feeling lightheaded, nauseated. Patient agrees to have blood. Zofran given for nausea with some relief.

## 2020-10-05 NOTE — PLAN OF CARE
Pt is alert and oriented X4. Denies chest pain or shortness of breath. Reports of nausea, improving with Zofran, declined other interventions. Blood infusing. Given Ibuprofen for incisional pain. Will continue to monitor pt per POC.

## 2020-10-05 NOTE — PROGRESS NOTES
Post  Anesthesia Follow Up Note    Patient: Ruby Morrison    Patient location: Postpartum floor.    Chief complaint: Acute postoperative pain management s/p intrathecal morphine administration     Procedure(s) Performed:  Procedure(s):   SECTION    Anesthesia type: Spinal Block    Subjective:     Pain Control: 0/10 at rest and 0/10 with ambulation    Additional ROS:  She does not complain of pruritis at this time. She denies weakness, denies paresthesia, denies difficulties breathing or voiding, denies nausea or vomiting. She is able to ambulate and tolerates regular diet.    Objective:    Respiratory Function (RR / SpO2 / Airway Patency): Satisfactory    Cardiac Function (HR / Rhythm / BP): Satisfactory    Strength and sensation lower extremities: Normal    Site of spinal/epidural insertion: No signs of infection or inflammation.     Last Vitals: /75   Pulse 93   Temp 36.8  C (98.2  F) (Oral)   Resp 16   SpO2 97%   Breastfeeding Unknown     Assessment and plan:   Ruby Morrison is a 35 year old female  POD #5 s/p   with IT bupivacaine (1.6ml), fentanyl (10mcg) and morphine (100mcg); and single shot TAP nerve block injections with 20 mL bupivacaine 0.25% and 20mL liposome bupivacaine (Exparel) long-acting 1.3% given in the PACU for postoperative analgesia.  Pt is ambulating without difficulty, no weakness or paresthesias.  There is no evidence of adverse side effects associated with spinal and nerve block injections.  The patient is receiving adequate incisional pain control at this time and anticipate up to 72 hours of incisional pain control.  However, we further anticipate that the patient will require opioid/nonopioid analgesics for visceral and muscle pain that is not controlled with local anesthetic.      In brief summary, her post-operative analgesia is adequate today. She is complaining of a subtle headache that she describes that is constant and is not  positional. Low suspicion for spinal headache at this time. Further interventional analgesic strategies would be of little utility at this time. Thus, we recommend proceeding with PO analgesics including staggered dosing of NSAIDs (ibuprofen) and acetaminophen, with a taper of oxycodone.     Thank you for including us in the care for this patient.    Blaise Bernal DO  Anesthesia Resident, PGY2

## 2020-10-05 NOTE — PROVIDER NOTIFICATION
10/05/20 1710   Provider Notification   Provider Name/Title Dr. Srivastava   Method of Notification Electronic Page   Request Evaluate-Remote   Notification Reason Status Update;Other  (discharge order)   blood transfusion completed for Hgb 7.7 with symptoms. VSS. the plan is for pt to discharge today, no order is signed or ordered. please advise Yesy, [phone]  thanks

## 2020-10-06 NOTE — PROVIDER NOTIFICATION
10/05/20 1946   Provider Notification   Provider Name/Title MD Pina   Method of Notification Electronic Page   Notification Reason Other   6374 A.F pt reports feeling well after blood transfusion and is eager to go home tonight. can you please place discharge order and send script for Zofran to [place] on file for patient. thank you Seema PEGUERO [phone]

## 2020-10-06 NOTE — PROGRESS NOTES
VSS postoperative mother. Incision CDI open to air. interdry supplied. Discharge instructions reviewed with patient and discharge medications provided to patient. [facility] Pt advised need for close monitoring of dizziness, and vomiting/nausea. Pt informed of need for BP Follow-up in 1 week and routine 6 week postoperative appointment. Discharged to home with spouse at 2100.

## 2020-10-06 NOTE — PLAN OF CARE
Pt states that dizziness and lightheadedness is improving and wanting to be discharged. Provider notified, working on discharge order.

## 2020-10-06 NOTE — PROVIDER NOTIFICATION
10/05/20 1906   Provider Notification   Provider Name/Title Dr. Everett ([id number])   Method of Notification Electronic Page   Request Evaluate-Remote   Notification Reason Status Update   pt wanting to go home. discharge order needed. also requesting Zofran that she can pick it up in [facility] that she has on file. please call Yesy [phone]. Thanks    A call back from provider: plan is provider to put discharge zofran. Provider will call back RN for update.

## 2020-10-09 LAB — COPATH REPORT: NORMAL

## 2020-10-12 ENCOUNTER — TELEPHONE (OUTPATIENT)
Dept: MATERNAL FETAL MEDICINE | Facility: CLINIC | Age: 35
End: 2020-10-12

## 2020-10-23 DIAGNOSIS — J45.30 MILD PERSISTENT ASTHMA WITHOUT COMPLICATION: ICD-10-CM

## 2020-10-26 NOTE — TELEPHONE ENCOUNTER
Routing refill request to provider for review/approval because:  Failed protocols: ACT and follow up     Iveth Kiser RN

## 2020-10-27 RX ORDER — MONTELUKAST SODIUM 10 MG/1
TABLET ORAL
Qty: 90 TABLET | Refills: 1 | Status: SHIPPED | OUTPATIENT
Start: 2020-10-27 | End: 2021-01-18

## 2020-11-02 ENCOUNTER — MYC MEDICAL ADVICE (OUTPATIENT)
Dept: OBGYN | Facility: CLINIC | Age: 35
End: 2020-11-02

## 2020-11-03 ENCOUNTER — TELEPHONE (OUTPATIENT)
Dept: OBGYN | Facility: CLINIC | Age: 35
End: 2020-11-03

## 2020-11-03 NOTE — TELEPHONE ENCOUNTER
Patient advised to follow-up with [facility]. If they are unable to fit her in this week she will let us know. Katrina Puga RN

## 2020-11-03 NOTE — TELEPHONE ENCOUNTER
Ruby left message on triage voicemail.  She is seen on 7th floor.  Was told to call here regarding incision concerns    Tried to reach Ruby but received voicemail.  Left message to call back.

## 2020-11-03 NOTE — TELEPHONE ENCOUNTER
Patient delivered by CS on 10/1 with triplets. Plan is to see primary OB doc next week for PP check. Patient attached a couple of photos of her incision. She still has a small opening on left and right side of incision. Usually has a very small amount of clear drainage througtout the day, sometimes some blood. No pain, redness, warmth, or odor. Any concerns? Katrina Puga RN

## 2020-11-03 NOTE — TELEPHONE ENCOUNTER
----- Message from Zeb Hernandez sent at 11/3/2020  3:36 PM CST -----  Regarding: Post  Incision Issues, Drainage  [facility]    Phone Message    May a detailed message be left on voicemail: no     Reason for Call: Other: Pt called to schedule an appt in [facility] for post  incision issues. Pt's incision is draining more than usual and she was told by her [facility] providers to see someone in [facility] since they performed the surgery. Nothing available in outpatient until  for OB MD/Resident. Please call Pt back.     Action Taken: Message routed to:  Other: S RN P [facility]    Travel Screening: Not Applicable

## 2020-11-04 ENCOUNTER — OFFICE VISIT (OUTPATIENT)
Dept: OBGYN | Facility: CLINIC | Age: 35
End: 2020-11-04
Payer: COMMERCIAL

## 2020-11-04 VITALS
HEART RATE: 96 BPM | SYSTOLIC BLOOD PRESSURE: 130 MMHG | BODY MASS INDEX: 37.89 KG/M2 | OXYGEN SATURATION: 100 % | WEIGHT: 194 LBS | DIASTOLIC BLOOD PRESSURE: 82 MMHG

## 2020-11-04 DIAGNOSIS — Z09 SURGERY FOLLOW-UP: Primary | ICD-10-CM

## 2020-11-04 PROCEDURE — 99212 OFFICE O/P EST SF 10 MIN: CPT | Performed by: OBSTETRICS & GYNECOLOGY

## 2020-11-04 NOTE — PROGRESS NOTES
SUBJECTIVE:  Ruby Morrison presents for post-operative evaluation of  section incision. She is status post primary low transverse  section for delivery of triplets at 30 weeks 3 days on 10/1/2020 (Dr. Burns).  Her babies have done well.  She comes in today as she has noted 2 small reddened areas in the incision, with some clear non-- foul drainage from those areas.  She has had no fevers, chills.      OBJECTIVE:  /82   Pulse 96   Wt 88 kg (194 lb)   SpO2 100%   Breastfeeding Yes   BMI 37.89 kg/m   Abd: soft, non-tender.  The incision is healing well, with 2 small reddened slightly crusted areas in the left and right periphery.  A small amount of suture is visible in the left reddened area.  There is no tenderness, no evidence of infection.      ASSESSMENT: Healing Pfannenstiel incision.      PLAN: She has office follow-up planned with Dr. Bernal.  She will call if her wound symptoms are worsening, or if she has fever/chills.

## 2020-11-04 NOTE — Clinical Note
Suleiman Pham  I am not sure how I should code this or what the diagnosis should be.  Have you suggestions?  (As you know I was the culprit who did a no charge for the most recent patient who was followed by us during pregnancy but delivered by [facility]:)  Thanks

## 2020-11-16 ENCOUNTER — MYC MEDICAL ADVICE (OUTPATIENT)
Dept: OBGYN | Facility: CLINIC | Age: 35
End: 2020-11-16

## 2020-11-16 DIAGNOSIS — Z20.822 EXPOSURE TO COVID-19 VIRUS: Primary | ICD-10-CM

## 2020-11-16 NOTE — TELEPHONE ENCOUNTER
Pt needs to get in for IUD placement.  She delivered her triplets 10/1.  Is there somewhere coming up in your schedule where you could see her?  She was exposed to her mom who has Covid so she is unable to come to her appointment that is scheduled later this week.  Janet Dickerson RN

## 2020-11-16 NOTE — TELEPHONE ENCOUNTER
TC to pt.  She is aware of recommendation for covid test.  She will continue 14 day quarantine for kids (they haven't gone anywhere).  Scheduled IUD insertion for 12/3/20.  Janet Dickerson RN

## 2020-11-16 NOTE — TELEPHONE ENCOUNTER
Looks like her message was forwarded to me, but then she replied quickly so that glitch probably happened.

## 2020-11-17 DIAGNOSIS — Z20.822 EXPOSURE TO COVID-19 VIRUS: ICD-10-CM

## 2020-11-17 PROCEDURE — U0003 INFECTIOUS AGENT DETECTION BY NUCLEIC ACID (DNA OR RNA); SEVERE ACUTE RESPIRATORY SYNDROME CORONAVIRUS 2 (SARS-COV-2) (CORONAVIRUS DISEASE [COVID-19]), AMPLIFIED PROBE TECHNIQUE, MAKING USE OF HIGH THROUGHPUT TECHNOLOGIES AS DESCRIBED BY CMS-2020-01-R: HCPCS | Performed by: OBSTETRICS & GYNECOLOGY

## 2020-11-18 LAB
SARS-COV-2 RNA SPEC QL NAA+PROBE: NOT DETECTED
SPECIMEN SOURCE: NORMAL

## 2020-12-09 NOTE — PROGRESS NOTES
Chief Complaint   Patient presents with     IUD       Initial Temp 98.1  F (36.7  C) (Oral)   Ht 1.524 m (5')   Wt 84.5 kg (186 lb 3.2 oz)   Breastfeeding Yes   BMI 36.36 kg/m   Estimated body mass index is 36.36 kg/m  as calculated from the following:    Height as of this encounter: 1.524 m (5').    Weight as of this encounter: 84.5 kg (186 lb 3.2 oz).  BP completed using cuff size: regular    Questioned patient about current smoking habits.  Pt. has never smoked.          The following HM Due: NONE      The following patient reported/Care Every where data was sent to:  P ABSTRACT QUALITY INITIATIVES [[id number]]  n/a      n/a, patient has appointment for today and orders have been placed        MIRENA   LOT: [id number]   EXP: 2023   NDC: 51037-741-70

## 2020-12-10 ENCOUNTER — PRENATAL OFFICE VISIT (OUTPATIENT)
Dept: OBGYN | Facility: CLINIC | Age: 35
End: 2020-12-10
Payer: COMMERCIAL

## 2020-12-10 VITALS
DIASTOLIC BLOOD PRESSURE: 80 MMHG | WEIGHT: 186.2 LBS | HEIGHT: 60 IN | TEMPERATURE: 98.1 F | BODY MASS INDEX: 36.56 KG/M2 | SYSTOLIC BLOOD PRESSURE: 122 MMHG

## 2020-12-10 DIAGNOSIS — Z12.4 SCREENING FOR CERVICAL CANCER: ICD-10-CM

## 2020-12-10 DIAGNOSIS — Z30.430 ENCOUNTER FOR IUD INSERTION: Primary | ICD-10-CM

## 2020-12-10 LAB — HCG UR QL: NEGATIVE

## 2020-12-10 PROCEDURE — 81025 URINE PREGNANCY TEST: CPT | Performed by: OBSTETRICS & GYNECOLOGY

## 2020-12-10 PROCEDURE — 58300 INSERT INTRAUTERINE DEVICE: CPT | Performed by: OBSTETRICS & GYNECOLOGY

## 2020-12-10 PROCEDURE — 87624 HPV HI-RISK TYP POOLED RSLT: CPT | Performed by: OBSTETRICS & GYNECOLOGY

## 2020-12-10 PROCEDURE — G0145 SCR C/V CYTO,THINLAYER,RESCR: HCPCS | Performed by: OBSTETRICS & GYNECOLOGY

## 2020-12-10 ASSESSMENT — ANXIETY QUESTIONNAIRES
7. FEELING AFRAID AS IF SOMETHING AWFUL MIGHT HAPPEN: NOT AT ALL
3. WORRYING TOO MUCH ABOUT DIFFERENT THINGS: NOT AT ALL
1. FEELING NERVOUS, ANXIOUS, OR ON EDGE: NOT AT ALL
GAD7 TOTAL SCORE: 0
5. BEING SO RESTLESS THAT IT IS HARD TO SIT STILL: NOT AT ALL
2. NOT BEING ABLE TO STOP OR CONTROL WORRYING: NOT AT ALL
6. BECOMING EASILY ANNOYED OR IRRITABLE: NOT AT ALL
IF YOU CHECKED OFF ANY PROBLEMS ON THIS QUESTIONNAIRE, HOW DIFFICULT HAVE THESE PROBLEMS MADE IT FOR YOU TO DO YOUR WORK, TAKE CARE OF THINGS AT HOME, OR GET ALONG WITH OTHER PEOPLE: NOT DIFFICULT AT ALL

## 2020-12-10 ASSESSMENT — PATIENT HEALTH QUESTIONNAIRE - PHQ9
5. POOR APPETITE OR OVEREATING: NOT AT ALL
SUM OF ALL RESPONSES TO PHQ QUESTIONS 1-9: 0

## 2020-12-10 ASSESSMENT — MIFFLIN-ST. JEOR: SCORE: 1471.1

## 2020-12-10 NOTE — PROGRESS NOTES
Ruby is here for a 6-week postpartum checkup.    She had a CS for triplets: two viable boys, one viable girl, ranging 3-3.5 pounds.She went into  labor. They are   Since delivery, she has been breast feeding.  She has no signs of infection, bleeding or other complications.  She is not pregnant.  We discussed contraception and she has chosen Mirena IUD.    Mood is good.  Today's Depression Rating was   PHQ-9 SCORE 12/10/2020   PHQ-9 Total Score 0       EXAM:  Vitals:    12/10/20 0926   BP: 122/80   BP Location: Right arm   Patient Position: Sitting   Cuff Size: Adult Regular   Temp: 98.1  F (36.7  C)   TempSrc: Oral   Weight: 84.5 kg (186 lb 3.2 oz)   Height: 1.524 m (5')     HEENT: grossly normal.  NECK: no lymphadenopathy or thyroidomegaly.  LUNGS: CTA X 2, no rales or crackles.  BREASTS: symmetric, no masses or discharge  BACK: No spinal or CVA tenderness.  HEART: RRR without murmurs clicks or gallops.  ABDOMEN: soft, non tender, good bowel sounds, without masses rebound, guarding or tenderness.  Pfannenstiel incision well healed    PELVIC:    External genitalia: normal without lesion.                            Vagina: normal mucosa and rugae, no discharge.  Cervix: multiparous, well healed, without lesion.  Uterus: non pregnant in size, firm , mobile, no lesions.  Adnexa: non tender, without masses    EXTREMITIES: Warm to touch, good pulses, no ankle edema or calf tenderness.  NEUROLOGIC: grossly normal.    ASSESSMENT:   Normal 6-week postpartum exam after c/w for triplets,  labor.    PLAN:  Pap smear Done and Mirena IUD for contraception.    IUD INSERTION PROCEDURE    Ruby Morrison is a 35 year old female  who presents for Mirena IUD insertion.  Indication for IUD insertion is contraception.  No LMP recorded. (Menstrual status: Breast Feeding). .  The patient is currently using none for contraception.  She is in a monogamous sexual relationship.     Lab Results   Component Value Date     PAP NIL 07/10/2018       Results for orders placed or performed in visit on 12/10/20   HCG Qual, Urine ([id number])     Status: None   Result Value Ref Range    HCG Qual Urine Negative NEG^Negative       A complete discussion of the risks and benefits of IUD use and the details of the insertion procedure was held with the patient.    All questions were answered.  A consent form was signed.    Prior to the beginning of the procedure the team paused to verify the patient's identity, as well as the procedure to be performed and the site.  All equipment required was ready and available. The patient was positioned appropriately.     IUD Lot # MIRENA LOT: [id number] EXP: 2/2023 NDC: 21463-659-83    The patient was placed in low lithotomy.  A bimanual exam was performed and the uterus noted to be anteverted.  A speculum was placed and the cervix swabbed with Betadine.  A tenaculum was placed on the anterior cervical lip. A paracervical block was not performed.  The fundus sounded to 9 cm. The Mirena IUD was placed to the uterine fundus without difficulty.  The strings were cut to 3 cms.  The tenaculum was removed and hemostasis was ensured.  The speculum was removed.  The patient tolerated the procedure well.    PLAN:   The patient was asked to contact the clinic for any fever/chills/severe pelvic or abdominal pain or heavy bleeding. She was instructed in how to palpate her IUD strings.    FOLLOW-UP:  She was asked to follow up prn, and for her routine annual screening.

## 2020-12-10 NOTE — PATIENT INSTRUCTIONS

## 2020-12-11 ASSESSMENT — ANXIETY QUESTIONNAIRES: GAD7 TOTAL SCORE: 0

## 2020-12-14 ENCOUNTER — HEALTH MAINTENANCE LETTER (OUTPATIENT)
Age: 35
End: 2020-12-14

## 2020-12-14 LAB
COPATH REPORT: NORMAL
PAP: NORMAL

## 2020-12-15 ENCOUNTER — MEDICAL CORRESPONDENCE (OUTPATIENT)
Dept: HEALTH INFORMATION MANAGEMENT | Facility: CLINIC | Age: 35
End: 2020-12-15

## 2021-01-15 ENCOUNTER — MYC MEDICAL ADVICE (OUTPATIENT)
Dept: FAMILY MEDICINE | Facility: CLINIC | Age: 36
End: 2021-01-15

## 2021-01-15 ENCOUNTER — E-VISIT (OUTPATIENT)
Dept: FAMILY MEDICINE | Facility: CLINIC | Age: 36
End: 2021-01-15
Payer: COMMERCIAL

## 2021-01-15 DIAGNOSIS — Z53.9 ERRONEOUS ENCOUNTER--DISREGARD: Primary | ICD-10-CM

## 2021-01-18 ENCOUNTER — OFFICE VISIT (OUTPATIENT)
Dept: FAMILY MEDICINE | Facility: CLINIC | Age: 36
End: 2021-01-18
Payer: COMMERCIAL

## 2021-01-18 VITALS
WEIGHT: 187 LBS | SYSTOLIC BLOOD PRESSURE: 122 MMHG | DIASTOLIC BLOOD PRESSURE: 72 MMHG | BODY MASS INDEX: 36.71 KG/M2 | HEIGHT: 60 IN | HEART RATE: 81 BPM | OXYGEN SATURATION: 97 %

## 2021-01-18 DIAGNOSIS — J45.30 MILD PERSISTENT ASTHMA WITHOUT COMPLICATION: ICD-10-CM

## 2021-01-18 DIAGNOSIS — H00.014 HORDEOLUM EXTERNUM OF LEFT UPPER EYELID: ICD-10-CM

## 2021-01-18 DIAGNOSIS — L30.8 OTHER ECZEMA: Primary | ICD-10-CM

## 2021-01-18 PROCEDURE — 99214 OFFICE O/P EST MOD 30 MIN: CPT | Performed by: FAMILY MEDICINE

## 2021-01-18 RX ORDER — ALBUTEROL SULFATE 90 UG/1
2 AEROSOL, METERED RESPIRATORY (INHALATION) EVERY 4 HOURS PRN
Qty: 1 INHALER | Refills: 1 | Status: SHIPPED | OUTPATIENT
Start: 2021-01-18 | End: 2021-12-27

## 2021-01-18 RX ORDER — TRIAMCINOLONE ACETONIDE 1 MG/G
OINTMENT TOPICAL 2 TIMES DAILY
Qty: 80 G | Refills: 0 | Status: SHIPPED | OUTPATIENT
Start: 2021-01-18

## 2021-01-18 RX ORDER — MONTELUKAST SODIUM 10 MG/1
TABLET ORAL
Qty: 90 TABLET | Refills: 1 | Status: SHIPPED | OUTPATIENT
Start: 2021-01-18 | End: 2021-06-14

## 2021-01-18 ASSESSMENT — MIFFLIN-ST. JEOR: SCORE: 1474.73

## 2021-01-18 NOTE — PROGRESS NOTES
"  Assessment & Plan     ICD-10-CM    1. Other eczema  L30.8 triamcinolone (KENALOG) 0.1 % external ointment   2. Hordeolum externum of left upper eyelid  H00.014 EYE ADULT REFERRAL   3. Mild persistent asthma without complication  J45.30 albuterol (PROAIR HFA/PROVENTIL HFA/VENTOLIN HFA) 108 (90 Base) MCG/ACT inhaler     montelukast (SINGULAIR) 10 MG tablet     Asthma-stable on current meds, refilled, she has been uptpodate with flu  Stye-improving , advised warm compress if not resolving then follow up with eye care provider, referral placed  Eczema-advised moisturizing, and use steriod cream prn for short time          BMI:   Estimated body mass index is 36.52 kg/m  as calculated from the following:    Height as of this encounter: 1.524 m (5').    Weight as of this encounter: 84.8 kg (187 lb).   Weight management plan: Discussed healthy diet and exercise guidelines      See Patient Instructions    No follow-ups on file.    Lorenzo Brown DO  [facility]    George Beltran is a 35 year old who presents to clinic today for the following health issues  accompanied by her self:    4 , 2 ear old and triplet(4 months)        HPI     Eye(s) Problem  Onset/Duration: improved a bit since beginning, maybe started Thursday. Started taking benadryl Friday AM and swelling went down. One week ago she had a stye on lower lid and it resolved.   Description:   Location: YES, left   Pain: YES, less. The bump is tender   Redness: no  Accompanying Signs & Symptoms:  Discharge/mattering: no  Swelling: YES  Visual changes: no  Fever: no  Nasal Congestion: no  Bothered by bright lights: no  History:  Trauma: no  Foreign body exposure: no  Wearing contacts: no  Precipitating or alleviating factors: None  Therapies tried and outcome: benadryl  .:[id number]::\"None\"}    Asthma Follow-Up    Was ACT completed today?    Yes    ACT Total Scores 1/18/2021   ACT TOTAL SCORE -   ASTHMA ER VISITS -   ASTHMA " HOSPITALIZATIONS -   ACT TOTAL SCORE (Goal Greater than or Equal to 20) 25   In the past 12 months, how many times did you visit the emergency room for your asthma without being admitted to the hospital? 0   In the past 12 months, how many times were you hospitalized overnight because of your asthma? 0          How many days per week do you miss taking your asthma controller medication?  I do not have an asthma controller medication    Please describe any recent triggers for your asthma: None    Have you had any Emergency Room Visits, Urgent Care Visits, or Hospital Admissions since your last office visit?  No    eczemaon right hand itchy, she washes her hands a lot          Review of Systems   Constitutional, HEENT, cardiovascular, pulmonary, GI, , musculoskeletal, neuro, skin, endocrine and psych systems are negative, except as otherwise noted.      Objective    /72   Pulse 81   Ht 1.524 m (5')   Wt 84.8 kg (187 lb)   SpO2 97%   BMI 36.52 kg/m    Body mass index is 36.52 kg/m .  Physical Exam   GENERAL: healthy, alert and no distress  EYES: Eyes grossly normal to inspection, PERRL and conjunctivae and sclerae normal, upper lid non tender small cystic lesion  HENT: ear canals and TM's normal, nose and mouth without ulcers or lesions  NECK: no adenopathy, no asymmetry, masses, or scars and thyroid normal to palpation  RESP: lungs clear to auscultation - no rales, rhonchi or wheezes  BREAST: normal without masses, tenderness or nipple discharge and no palpable axillary masses or adenopathy  CV: regular rate and rhythm, normal S1 S2, no S3 or S4, no murmur, click or rub, no peripheral edema and peripheral pulses strong  ABDOMEN: soft, nontender, no hepatosplenomegaly, no masses and bowel sounds normal  SKIN: ezcema on fingers on the left hands, no suspicious lesions or rashes  NEURO: Normal strength and tone, mentation intact and speech normal  PSYCH: mentation appears normal, affect  normal/bright

## 2021-01-18 NOTE — PATIENT INSTRUCTIONS
Warm compress on lip  If worsening and not resolving then follow up as planned  Use asthma meds as planned  Use Aquaphor, wear gloves, and use steroid cream for few days  Lorenzo Brown D.O.    Patient Education

## 2021-01-19 ASSESSMENT — ASTHMA QUESTIONNAIRES: ACT_TOTALSCORE: 25

## 2021-01-20 NOTE — PLAN OF CARE
Problem: Patient Care Overview  Goal: Plan of Care/Patient Progress Review  Outcome: No Change  VSS, except one elevated BP during shift. Provider aware and visited with patient this afternoon. Up ad augustin, voiding freely. Managing pain with ibuprofen and tylenol. Breastfeeding with assistance, able to position infant and adjust nipple shield but requires guidance for deeper latch. Education given about milk supply, hand expression/massage, and skin to skin. Able to express 5 ml of colostrum onto spoon. Patient interested in pumping, lactation consult placed. No concerns, continue to monitor and provide breastfeeding support and reassurance.       Safe at home booklet given to pt

## 2021-01-22 ENCOUNTER — VIRTUAL VISIT (OUTPATIENT)
Dept: FAMILY MEDICINE | Facility: OTHER | Age: 36
End: 2021-01-22

## 2021-01-22 NOTE — PROGRESS NOTES
"Date: 2021 15:38:34  Clinician: Gaurav Girard  Clinician NPI: [id number]  Patient: KELSY SHIPMAN  Patient : 1985  Patient Address: [facility] [address] , [place] [id number]  Patient Phone: [phone]  Visit Protocol: Eye conditions  Patient Summary:  Kelsy is a 35 year old (: 1985 ) female who initiated a [facility] Visit for stye.  When asked the question \"Please sign me up to receive news, health information and promotions. \", Kelsy responded \"No\".    Images of her eye condition were uploaded.   Her symptoms started 1-2 weeks ago and affect the left eye. The symptoms consist of eye pain, eyelid swelling, and bump(s) on the eyelid.   Symptom details     Eyelid bump(s): Kelsy has multiple bumps on her eyelid. The bump(s) on her eyelid is tender.    Eye pain: She is experiencing severe eye pain (7-9 on a 10 point pain scale). She has not taken over-the-counter medication for the pain.     Denied symptoms include itchy eye(s), light sensitivity, drainage coming from the eye(s), and eye redness. Kelsy has not experienced a decrease in vision and does not have subconjunctival hemorrhage. She does not feel feverish.   Precipitating events   She has not had a recent eye surgery, eye injury, foreign body in the eye(s), and cold or ear infection. She does not wear contact lenses.   Pertinent medical history  Kelsy has not ever been diagnosed with glaucoma.   Kelsy is not taking medication to treat her current symptoms.   Kelsy does not have diabetes.   Kelsy does not require proof of evaluation of her eye condition before returning to school, work, or .   Kelsy does not smoke or use smokeless tobacco.   She denies pregnancy and is breastfeeding. Her last period was over a month ago.     MEDICATIONS: Prenatal oral, albuterol sulfate inhalation, Singulair oral, ALLERGIES: NKDA  Clinician Response:  Dear Kelsy,  Based on the information provided, the bump on your eyelid is most likely a stye " (ondina). A stye is a red, painful bump that forms when glands on the edge of the eyelid become infected or inflamed.  Medication information  I am prescribing:  Erythromycin 5 mg/gram (0.5%) ophthalmic (eye) ointment. Apply 1 cm ribbon into the affected eye(s) 4-6 times per day for 7 days. There are no refills with this prescription.  The medication I prescribed is an antibiotic medication. Infections can be caused by either bacteria or a virus, and often have similar symptoms, so it is possible that this is a viral infection. Antibiotics are only effective against bacterial infections, so when it is caused by a virus, the medication will not help symptoms improve or make it less contagious.  Self care  I recommend wetting a clean washcloth with warm water and gently placing it on the bump/swollen area of your eye for 10-15 minutes. Reheat the washcloth with warm water when it cools. Doing this 3-4 times a day will ease soreness and help the stye to drain. Do not squeeze or try to pop your stye because this could cause the infection to spread.  To reduce the spread of the eye infection, you should not use eye makeup until the infection has fully resolved, and be sure to wash your hands at least once per hour and avoid touching the eyes as much as possible.  The following will reduce the risk for future eye infections:     Frequent handwashing    Replace towels and washcloths daily    Do not share towels and washcloths with others    Replace eye makeup used while eyes were infected    Do not use anyone else's eye makeup     Steps you can take to be as comfortable as possible:     Take regular breaks and remember to blink regularly when reading or using a computer for long periods of time    Wear sunglasses when outside    Wear eye protection when swimming or working with chemicals    Use good lighting     When to seek care  Please make an appointment to be seen in a clinic or urgent care if any of the following  occurs:     You develop new symptoms or your symptoms becomes worse.    Your symptoms do not improve within 2 days of starting treatment.      Diagnosis: Stye (hordeolum externum)  Diagnosis ICD: H00.019  Prescription: erythromycin 5 mg/gram (0.5 %) ophthalmic (eye) ointment 1 3.5 gram tube, 7 days supply. Apply 1 cm ribbon into the affected eye(s) 4-6 times per day for 7 days. Refills: 0, Refill as needed: no, Allow substitutions: yes  Pharmacy: [id number] - [phone] - [address] [id number]

## 2021-02-10 ENCOUNTER — OFFICE VISIT (OUTPATIENT)
Dept: OPHTHALMOLOGY | Facility: CLINIC | Age: 36
End: 2021-02-10
Payer: COMMERCIAL

## 2021-02-10 DIAGNOSIS — H00.14 CHALAZION OF LEFT UPPER EYELID: Primary | ICD-10-CM

## 2021-02-10 DIAGNOSIS — H02.88A MEIBOMIAN GLAND DYSFUNCTION (MGD) OF UPPER AND LOWER LIDS OF BOTH EYES: ICD-10-CM

## 2021-02-10 DIAGNOSIS — H02.88B MEIBOMIAN GLAND DYSFUNCTION (MGD) OF UPPER AND LOWER LIDS OF BOTH EYES: ICD-10-CM

## 2021-02-10 PROCEDURE — 92002 INTRM OPH EXAM NEW PATIENT: CPT | Performed by: STUDENT IN AN ORGANIZED HEALTH CARE EDUCATION/TRAINING PROGRAM

## 2021-02-10 ASSESSMENT — VISUAL ACUITY
CORRECTION_TYPE: GLASSES
OD_SC+: -1
OS_SC+: -1
OD_SC: 20/25
METHOD: SNELLEN - LINEAR
OS_SC: 20/20

## 2021-02-10 NOTE — PROGRESS NOTES
Current Eye Medications:  Was taking erythromycin ointment about two weeks ago for 7 days left eye, but didn't help.     Subjective:  Multiple styes left eye lids for last month and then had one start in right upper eyelid on 2/6/21. Mattering from styes decompressing both eyes. Eyes were tender when styes larger, but both opened yesterday. Vision is fine both eyes distance and near.     Patient had triplets in October. Also has 2 kids 5 and under at home as well.  History of Lasik 1 year ago.     Objective:  See Ophthalmology Exam.      Assessment:  Ruby Morrison is a 35 year old female who presents with:   Encounter Diagnoses   Name Primary?     Chalazion of left upper eyelid Discussed hot packing (causes some lid swelling) or possibility of doxycycline - but would hold on that since she is nursing.      Meibomian gland dysfunction (MGD) of upper and lower lids of both eyes      Plan:  1. Apply a hot compress for 15 minutes at least 4 times a day. Use a microwaveable pack filled with dry,uncooked rice, gel beads, etc.  This will reduce the swelling and soften the hardened oils blocking the duct.   2. Massage the area gently after applying the compress to promote drainage.  Do not try to pop or squeeze the chalazion.  3. Once a day, with eyes closed, clean your eyelids with baby shampoo to help  reduce clogging of the duct, as well as help prevent recurrences.  Esau Pettit MD  [phone]

## 2021-02-10 NOTE — PATIENT INSTRUCTIONS
Chalazion  There are tiny oil glands in the upper eyelid near the eyelashes. They help lubricate the eyes.   If these glands become blocked, a small hard lump forms in the upper eyelid, called a chalazion.   The lump can take several weeks to grow, causing pain and tenderness, sensitivity to light, and increased tearing.    Home Care  1. Apply a hot compress for 15 minutes at least 4 times a day. Use a microwaveable pack filled with dry,uncooked rice, gel beads, etc.  This will reduce the swelling and soften the hardened oils blocking the duct.   2. Massage the area gently after applying the compress to promote drainage.  Do not try to pop or squeeze the chalazion.  3. Once a day, with eyes closed, clean your eyelids with baby shampoo to help  reduce clogging of the duct, as well as help prevent recurrences.  Esau Pettit MD  [phone]

## 2021-02-10 NOTE — LETTER
2/10/2021         RE: Ruby Morrison  [address] [place] [address]        Dear Colleague,    Thank you for referring your patient, Ruby Morrison, to the [facility]. Please see a copy of my visit note below.     Current Eye Medications:  Was taking erythromycin ointment about two weeks ago for 7 days left eye, but didn't help.     Subjective:  Multiple styes left eye lids for last month and then had one start in right upper eyelid on 2/6/21. Mattering from styes decompressing both eyes. Eyes were tender when styes larger, but both opened yesterday. Vision is fine both eyes distance and near.     Patient had triplets in October. Also has 2 kids 5 and under at home as well.  History of Lasik 1 year ago.     Objective:  See Ophthalmology Exam.      Assessment:  Ruby Morrison is a 35 year old female who presents with:   Encounter Diagnoses   Name Primary?     Chalazion of left upper eyelid Discussed hot packing (causes some lid swelling) or possibility of doxycycline - but would hold on that since she is nursing.      Meibomian gland dysfunction (MGD) of upper and lower lids of both eyes      Plan:  1. Apply a hot compress for 15 minutes at least 4 times a day. Use a microwaveable pack filled with dry,uncooked rice, gel beads, etc.  This will reduce the swelling and soften the hardened oils blocking the duct.   2. Massage the area gently after applying the compress to promote drainage.  Do not try to pop or squeeze the chalazion.  3. Once a day, with eyes closed, clean your eyelids with baby shampoo to help  reduce clogging of the duct, as well as help prevent recurrences.  Esau Pettit MD  [phone]                             Again, thank you for allowing me to participate in the care of your patient.        Sincerely,        Esau Pettit MD

## 2021-02-13 NOTE — TELEPHONE ENCOUNTER
Prescription approved per [facility] Refill Protocol.  Jordyn Mark RN   
Requested Prescriptions   Pending Prescriptions Disp Refills     montelukast (SINGULAIR) 10 MG tablet  Last Written Prescription Date:  9/19/2017  Last Fill Quantity: 90,  # refills: 0   Last Office Visit with [facility], P or [facility] prescribing provider:  10/20/2017  Future Office Visit:      90 tablet 0     Sig: Take 1 tablet (10 mg) by mouth At Bedtime    Leukotriene Inhibitors Protocol Passed    12/18/2017  8:35 AM       Passed - Patient is age 12 or older    If patient is under 16, ok to refill using age based dosing.          Passed - Asthma control test 20 or greater in past 6 months    Please review ACT score.          Passed - Recent (6 mo) or future visit with authorizing provider's specialty    Patient had office visit in the last 6 months or has a visit in the next 30 days with authorizing provider.  See chart review.               
English

## 2021-03-02 NOTE — TELEPHONE ENCOUNTER
"Called Ruby as f/u to hospital discharge. Pt feels \"good\" babies are doing well in NICU. Pumping q2hr, pain well controlled on tylenol/advil 1-2x/day. Ambulating fine. Denies needs at this time. Will f/u with Dr. Bernal's office for PP visits.   "
none

## 2021-03-17 ENCOUNTER — MYC MEDICAL ADVICE (OUTPATIENT)
Dept: FAMILY MEDICINE | Facility: CLINIC | Age: 36
End: 2021-03-17

## 2021-03-17 DIAGNOSIS — Z00.00 ROUTINE HISTORY AND PHYSICAL EXAMINATION OF ADULT: Primary | ICD-10-CM

## 2021-03-17 NOTE — TELEPHONE ENCOUNTER
Routing My Chart message to PCP    Patient wanting labs ordered so she can get prior to annual physical 3-30    Reg Corbin, RN, BSN, PHN  [facility]

## 2021-06-28 ENCOUNTER — TELEPHONE (OUTPATIENT)
Dept: FAMILY MEDICINE | Facility: CLINIC | Age: 36
End: 2021-06-28

## 2021-06-28 NOTE — TELEPHONE ENCOUNTER
Patient Quality Outreach      Summary:    Patient has the following on her problem list/HM:   Hypertension   Last three blood pressure readings:  BP Readings from Last 3 Encounters:   01/18/21 122/72   12/10/20 122/80   11/04/20 130/82     Blood pressure: Passed    HTN Guidelines:  ? 139/89     Patient is due/failing the following:   ACT needed, BP check and Adult/Adolescent physical, date due: 10/31/2020  Fasting Labs  Type of outreach:    Sent [employer] message.    Questions for provider review:    None                                                                                                                                     Chetna Duarte MA       Chart routed to Care Team.

## 2021-07-20 NOTE — TELEPHONE ENCOUNTER
Patient Quality Outreach 2nd Attempt      Summary:    Type of outreach:    Phone, left message for patient/parent to call back.  MYCHART READ  Next Steps:  Reach out within 90 days via none.    Max number of attempts reached: Yes. Will try again in 90 days if patient still on fail list.    Questions for provider review:    None                                                                                                                    Chetna Duarte MA       Chart routed to Care Team.

## 2021-09-21 DIAGNOSIS — J45.30 MILD PERSISTENT ASTHMA WITHOUT COMPLICATION: ICD-10-CM

## 2021-09-22 RX ORDER — MONTELUKAST SODIUM 10 MG/1
TABLET ORAL
Qty: 90 TABLET | Refills: 0 | Status: SHIPPED | OUTPATIENT
Start: 2021-09-22 | End: 2021-12-24

## 2021-10-02 ENCOUNTER — HEALTH MAINTENANCE LETTER (OUTPATIENT)
Age: 36
End: 2021-10-02

## 2021-10-12 NOTE — PROGRESS NOTES
SUBJECTIVE:   CC: Ruby Morrison is an 36 year old woman who presents for preventive health visit.   Patient has been advised of split billing requirements and indicates understanding: Yes  Healthy Habits:     Getting at least 3 servings of Calcium per day:  Yes    Bi-annual eye exam:  Yes    Dental care twice a year:  Yes    Sleep apnea or symptoms of sleep apnea:  None    Diet:  Regular (no restrictions)    Barriers to taking medications:  None    PHQ-2 Total Score: 0    Additional concerns today:  No      Today's PHQ-2 Score:   PHQ-2 ( 1999 [employer]) 10/13/2021   Q1: Little interest or pleasure in doing things 0   Q2: Feeling down, depressed or hopeless 0   PHQ-2 Score 0   Q1: Little interest or pleasure in doing things Not at all   Q2: Feeling down, depressed or hopeless Not at all   PHQ-2 Score 0       Abuse: Current or Past (Physical, Sexual or Emotional) - No  Do you feel safe in your environment? Yes  Have you ever done Advance Care Planning? (For example, a Health Directive, POLST, or a discussion with a medical provider or your loved ones about your wishes): No, advance care planning information given to patient to review.  Patient declined advance care planning discussion at this time.    Social History     Tobacco Use     Smoking status: Never Smoker     Smokeless tobacco: Never Used   Substance Use Topics     Alcohol use: Not Currently     Alcohol/week: 0.0 standard drinks     Comment: Maybe once every 6 months         Alcohol Use 10/13/2021   Prescreen: >3 drinks/day or >7 drinks/week? No   Prescreen: >3 drinks/day or >7 drinks/week? -       Reviewed orders with patient.  Reviewed health maintenance and updated orders accordingly - Yes  Labs reviewed in EPIC  BP Readings from Last 3 Encounters:   10/13/21 122/80   01/18/21 122/72   12/10/20 122/80    Wt Readings from Last 3 Encounters:   10/13/21 85 kg (187 lb 8 oz)   01/18/21 84.8 kg (187 lb)   12/10/20 84.5 kg (186 lb 3.2 oz)                   Patient Active Problem List   Diagnosis     Mild persistent asthma     Environmental allergies     PCOS (polycystic ovarian syndrome)     Chronic hypertension     Morbid obesity (H)     Mixed hyperlipidemia     Encounter for IUD insertion     Past Surgical History:   Procedure Laterality Date      SECTION N/A 10/1/2020    Procedure:  SECTION;  Surgeon: Abigail Burns MD;  Location: [facility]     HYMENOTOMY       LASIK Bilateral 2020    DR. Zarco       Social History     Tobacco Use     Smoking status: Never Smoker     Smokeless tobacco: Never Used   Substance Use Topics     Alcohol use: Not Currently     Alcohol/week: 0.0 standard drinks     Comment: Maybe once every 6 months     Family History   Problem Relation Age of Onset     Migraines Mother      Hypertension Father      Lipids Father      Diabetes Maternal Grandmother      Diabetes Paternal Grandmother      Cancer Paternal Grandfather         luekemia         Current Outpatient Medications   Medication Sig Dispense Refill     albuterol (PROAIR HFA/PROVENTIL HFA/VENTOLIN HFA) 108 (90 Base) MCG/ACT inhaler Inhale 2 puffs into the lungs every 4 hours as needed for shortness of breath / dyspnea 1 Inhaler 1     montelukast (SINGULAIR) 10 MG tablet +++NEED ANNUAL EXAM+++TAKE 1 TABLET AT BEDTIME 90 tablet 0     triamcinolone (KENALOG) 0.1 % external ointment Apply topically 2 times daily 80 g 0     levonorgestrel (MIRENA) 20 MCG/24HR IUD 1 each (20 mcg) by Intrauterine route once for 1 dose 1 each 0     No Known Allergies  Recent Labs   Lab Test 10/01/20  1105 20  1043 20  1114 20  1114 20  1550 20  1122 19  0923 10/10/18  0722 18  1516 18  0919 18  1724 10/20/14  0852 14  1455   A1C  --   --   --   --   --  5.8*  --   --   --   --   --   --   --    LDL  --   --   --   --   --   --  170* 99  --   --  Cannot estimate LDL when triglyceride exceeds 400 mg/dL  120*   < >  --    HDL   --   --   --   --   --   --  38* 32*  --   --  46*   < >  --    TRIG  --   --   --   --   --   --  109 260*  --   --  461*   < >  --    ALT 25 24  --  21   < > 28  --   --    < >   < >  --    < >  --    CR 0.42* 0.45*   < > 0.50*   < > 0.59  --  0.68   < >   < >  --    < >  --    GFRESTIMATED >90 >90   < > >90   < > >90  --  >90   < >   < >  --    < >  --    GFRESTBLACK >90 >90   < > >90   < > >90  --  >90   < >   < >  --    < >  --    POTASSIUM  --  3.9  --   --   --   --   --  4.2  --    < >  --    < >  --    TSH  --   --   --   --   --  1.48  --   --   --   --   --   --  1.85    < > = values in this interval not displayed.        Breast Cancer Screening:  Any new diagnosis of family breast, ovarian, or bowel cancer? No    FHS-7: No flowsheet data found.  click delete button to remove this line now  Patient under 40 years of age: Routine Mammogram Screening not recommended.   Pertinent mammograms are reviewed under the imaging tab.    History of abnormal Pap smear: NO - age 30-65 PAP every 5 years with negative HPV co-testing recommended  PAP / HPV Latest Ref Rng & Units 12/10/2020 7/10/2018 2015   PAP (Historical) - NIL NIL NIL   HPV16 NEG:Negative Negative Negative -   HPV18 NEG:Negative Negative Negative -   HRHPV NEG:Negative Negative Negative -     Reviewed and updated as needed this visit by clinical staff  Tobacco  Allergies  Meds   Med Hx  Surg Hx  Fam Hx  Soc Hx        Reviewed and updated as needed this visit by Provider                  Past Medical History:   Diagnosis Date     Asthma      Cat allergies      Encounter for supervision of other normal pregnancy 2015     GERD (gastroesophageal reflux disease)      Group B Streptococcus urinary tract infection affecting pregnancy in first trimester 2017     Hx of previous reproductive problem      Hypertension     during pregnancy     Mixed hyperlipidemia 2019      Past Surgical History:   Procedure Laterality Date       "SECTION N/A 10/1/2020    Procedure:  SECTION;  Surgeon: Abigail Burns MD;  Location: [facility]     HYMENOTOMY       LASIK Bilateral 2020    DR. Zarco       Review of Systems   Constitutional: Negative for chills and fever.   HENT: Negative for congestion, ear pain, hearing loss and sore throat.    Eyes: Negative for pain and visual disturbance.   Respiratory: Negative for cough and shortness of breath.    Cardiovascular: Negative for chest pain, palpitations and peripheral edema.   Gastrointestinal: Negative for abdominal pain, constipation, diarrhea, heartburn, hematochezia and nausea.   Breasts:  Negative for tenderness, breast mass and discharge.   Genitourinary: Negative for dysuria, frequency, genital sores, hematuria, pelvic pain, urgency, vaginal bleeding and vaginal discharge.   Musculoskeletal: Negative for arthralgias, joint swelling and myalgias.   Skin: Negative for rash.   Neurological: Negative for dizziness, weakness, headaches and paresthesias.   Psychiatric/Behavioral: Negative for mood changes. The patient is not nervous/anxious.           OBJECTIVE:   /80   Pulse 77   Temp 97  F (36.1  C) (Temporal)   Resp 20   Ht 1.535 m (5' 0.43\")   Wt 85 kg (187 lb 8 oz)   SpO2 98%   BMI 36.10 kg/m    Physical Exam  GENERAL: healthy, alert and no distress  EYES: Eyes grossly normal to inspection, PERRL and conjunctivae and sclerae normal  HENT: ear canals and TM's normal, nose and mouth without ulcers or lesions  NECK: no adenopathy, no asymmetry, masses, or scars and thyroid normal to palpation  RESP: lungs clear to auscultation - no rales, rhonchi or wheezes  BREAST: normal without masses, tenderness or nipple discharge and no palpable axillary masses or adenopathy  CV: regular rate and rhythm, normal S1 S2, no S3 or S4, no murmur, click or rub, no peripheral edema and peripheral pulses strong  ABDOMEN: soft, nontender, no hepatosplenomegaly, no masses and bowel sounds " "normal  MS: no gross musculoskeletal defects noted, no edema  SKIN: no suspicious lesions or rashes  NEURO: Normal strength and tone, mentation intact and speech normal  PSYCH: mentation appears normal, affect normal/bright    Diagnostic Test Results:  Labs reviewed in Epic    ASSESSMENT/PLAN:     Problem List Items Addressed This Visit     Mild persistent asthma     Stable . Updated ACT and AAP. Continue singulair and albuterol PRN         Chronic hypertension     Currently diet controlled. Salt restiction and weight loss advised         Morbid obesity (H)     Discussed diet and lifestyle modifications         Mixed hyperlipidemia    Relevant Orders    Lipid panel reflex to direct LDL Fasting    Encounter for IUD insertion      Other Visit Diagnoses     Routine general medical examination at a health care facility    -  Primary    Need for hepatitis C screening test        Screening for diabetes mellitus        Relevant Orders    Glucose    Class 3 severe obesity without serious comorbidity with body mass index (BMI) of 40.0 to 44.9 in adult, unspecified obesity type (H)        Ventral hernia without obstruction or gangrene        Relevant Orders    Adult General Surg Referral          Patient has been advised of split billing requirements and indicates understanding: Yes  COUNSELING:  Reviewed preventive health counseling, as reflected in patient instructions       Regular exercise       Healthy diet/nutrition    Estimated body mass index is 36.1 kg/m  as calculated from the following:    Height as of this encounter: 1.535 m (5' 0.43\").    Weight as of this encounter: 85 kg (187 lb 8 oz).    Weight management plan: Discussed healthy diet and exercise guidelines    She reports that she has never smoked. She has never used smokeless tobacco.      Counseling Resources:  ATP IV Guidelines  Pooled Cohorts Equation Calculator  Breast Cancer Risk Calculator  BRCA-Related Cancer Risk Assessment: FHS-7 Tool  FRAX Risk " Assessment  ICSI Preventive Guidelines  Dietary Guidelines for Americans, 2010  USDA's MyPlate  ASA Prophylaxis  Lung CA Screening    Nadia Stewart MD  [employer]

## 2021-10-13 ENCOUNTER — OFFICE VISIT (OUTPATIENT)
Dept: FAMILY MEDICINE | Facility: OTHER | Age: 36
End: 2021-10-13
Payer: COMMERCIAL

## 2021-10-13 VITALS
BODY MASS INDEX: 36.81 KG/M2 | OXYGEN SATURATION: 98 % | HEIGHT: 60 IN | DIASTOLIC BLOOD PRESSURE: 80 MMHG | SYSTOLIC BLOOD PRESSURE: 122 MMHG | HEART RATE: 77 BPM | TEMPERATURE: 97 F | WEIGHT: 187.5 LBS | RESPIRATION RATE: 20 BRPM

## 2021-10-13 DIAGNOSIS — E66.01 CLASS 3 SEVERE OBESITY WITHOUT SERIOUS COMORBIDITY WITH BODY MASS INDEX (BMI) OF 40.0 TO 44.9 IN ADULT, UNSPECIFIED OBESITY TYPE (H): ICD-10-CM

## 2021-10-13 DIAGNOSIS — K43.9 VENTRAL HERNIA WITHOUT OBSTRUCTION OR GANGRENE: ICD-10-CM

## 2021-10-13 DIAGNOSIS — I10 CHRONIC HYPERTENSION: ICD-10-CM

## 2021-10-13 DIAGNOSIS — Z30.430 ENCOUNTER FOR IUD INSERTION: ICD-10-CM

## 2021-10-13 DIAGNOSIS — E66.813 CLASS 3 SEVERE OBESITY WITHOUT SERIOUS COMORBIDITY WITH BODY MASS INDEX (BMI) OF 40.0 TO 44.9 IN ADULT, UNSPECIFIED OBESITY TYPE (H): ICD-10-CM

## 2021-10-13 DIAGNOSIS — E66.01 MORBID OBESITY (H): ICD-10-CM

## 2021-10-13 DIAGNOSIS — Z13.1 SCREENING FOR DIABETES MELLITUS: ICD-10-CM

## 2021-10-13 DIAGNOSIS — E78.2 MIXED HYPERLIPIDEMIA: ICD-10-CM

## 2021-10-13 DIAGNOSIS — Z11.59 NEED FOR HEPATITIS C SCREENING TEST: ICD-10-CM

## 2021-10-13 DIAGNOSIS — Z00.00 ROUTINE GENERAL MEDICAL EXAMINATION AT A HEALTH CARE FACILITY: Primary | ICD-10-CM

## 2021-10-13 DIAGNOSIS — J45.30 MILD PERSISTENT ASTHMA WITHOUT COMPLICATION: ICD-10-CM

## 2021-10-13 PROBLEM — Z98.891 S/P CESAREAN SECTION: Status: RESOLVED | Noted: 2020-10-01 | Resolved: 2021-10-13

## 2021-10-13 PROBLEM — Z30.41 ENCOUNTER FOR SURVEILLANCE OF CONTRACEPTIVE PILLS: Status: RESOLVED | Noted: 2019-10-31 | Resolved: 2021-10-13

## 2021-10-13 PROBLEM — O30.139: Status: RESOLVED | Noted: 2020-10-01 | Resolved: 2021-10-13

## 2021-10-13 LAB
CHOLEST SERPL-MCNC: 205 MG/DL
FASTING STATUS PATIENT QL REPORTED: YES
FASTING STATUS PATIENT QL REPORTED: YES
GLUCOSE BLD-MCNC: 94 MG/DL (ref 70–99)
HDLC SERPL-MCNC: 53 MG/DL
LDLC SERPL CALC-MCNC: 142 MG/DL
NONHDLC SERPL-MCNC: 152 MG/DL
TRIGL SERPL-MCNC: 51 MG/DL

## 2021-10-13 PROCEDURE — 82947 ASSAY GLUCOSE BLOOD QUANT: CPT | Performed by: FAMILY MEDICINE

## 2021-10-13 PROCEDURE — 80061 LIPID PANEL: CPT | Performed by: FAMILY MEDICINE

## 2021-10-13 PROCEDURE — 36415 COLL VENOUS BLD VENIPUNCTURE: CPT | Performed by: FAMILY MEDICINE

## 2021-10-13 PROCEDURE — 99395 PREV VISIT EST AGE 18-39: CPT | Performed by: FAMILY MEDICINE

## 2021-10-13 PROCEDURE — 99213 OFFICE O/P EST LOW 20 MIN: CPT | Mod: 25 | Performed by: FAMILY MEDICINE

## 2021-10-13 ASSESSMENT — ENCOUNTER SYMPTOMS
COUGH: 0
CONSTIPATION: 0
HEMATURIA: 0
SHORTNESS OF BREATH: 0
MYALGIAS: 0
DIZZINESS: 0
WEAKNESS: 0
PALPITATIONS: 0
EYE PAIN: 0
BREAST MASS: 0
DIARRHEA: 0
HEMATOCHEZIA: 0
JOINT SWELLING: 0
ARTHRALGIAS: 0
DYSURIA: 0
HEADACHES: 0
SORE THROAT: 0
NERVOUS/ANXIOUS: 0
ABDOMINAL PAIN: 0
FEVER: 0
FREQUENCY: 0
HEARTBURN: 0
PARESTHESIAS: 0
NAUSEA: 0
CHILLS: 0

## 2021-10-13 ASSESSMENT — PAIN SCALES - GENERAL: PAINLEVEL: NO PAIN (0)

## 2021-10-13 ASSESSMENT — MIFFLIN-ST. JEOR: SCORE: 1478.86

## 2021-10-13 NOTE — LETTER
My Asthma Action Plan    Name: Ruby Morrison   YOB: 1985  Date: 10/13/2021   My doctor: Nadia Stewart MD   My clinic: [facility]        My Control Medicine: Montelukast (Singulair) -  10 mg .  My Rescue Medicine: Albuterol (Proair/Ventolin/Proventil HFA) 2-4 puffs EVERY 4 HOURS as needed. Use a spacer if recommended by your provider.  My Oral Steroid Medicine: none My Asthma Severity:   Mild Persistent  Know your asthma triggers: smoke, pollens, animal dander, mold, humidity, strong odors and fumes and cold air  None            GREEN ZONE   Good Control    I feel good    No cough or wheeze    Can work, sleep and play without asthma symptoms       Take your asthma control medicine every day.     1. If exercise triggers your asthma, take your rescue medication    15 minutes before exercise or sports, and    During exercise if you have asthma symptoms  2. Spacer to use with inhaler: If you have a spacer, make sure to use it with your inhaler             YELLOW ZONE Getting Worse  I have ANY of these:    I do not feel good    Cough or wheeze    Chest feels tight    Wake up at night   1. Keep taking your Green Zone medications  2. Start taking your rescue medicine:    every 20 minutes for up to 1 hour. Then every 4 hours for 24-48 hours.  3. If you stay in the Yellow Zone for more than 12-24 hours, contact your doctor.  4. If you do not return to the Green Zone in 12-24 hours or you get worse, start taking your oral steroid medicine if prescribed by your provider.           RED ZONE Medical Alert - Get Help  I have ANY of these:    I feel awful    Medicine is not helping    Breathing getting harder    Trouble walking or talking    Nose opens wide to breathe       1. Take your rescue medicine NOW  2. If your provider has prescribed an oral steroid medicine, start taking it NOW  3. Call your doctor NOW  4. If you are still in the Red Zone after 20 minutes and you have not  reached your doctor:    Take your rescue medicine again and    Call 911 or go to the emergency room right away    See your regular doctor within 2 weeks of an Emergency Room or Urgent Care visit for follow-up treatment.          Annual Reminders:  Meet with Asthma Educator,  Flu Shot in the Fall, consider Pneumonia Vaccination for patients with asthma (aged 19 and older).    Pharmacy:    [id number] - [place] [address] AT [facility] [address]  [facility] - [address]    Electronically signed by Nadia Stewart MD   Date: 10/13/21                      Asthma Triggers  How To Control Things That Make Your Asthma Worse    Triggers are things that make your asthma worse.  Look at the list below to help you find your triggers and what you can do about them.  You can help prevent asthma flare-ups by staying away from your triggers.      Trigger                                                          What you can do   Cigarette Smoke  Tobacco smoke can make asthma worse. Do not allow smoking in your home, car or around you.  Be sure no one smokes at a child s day care or school.  If you smoke, ask your health care provider for ways to help you quit.  Ask family members to quit too.  Ask your health care provider for a referral to Quit Plan to help you quit smoking, or call 0-146-195-PLAN.     Colds, Flu, Bronchitis  These are common triggers of asthma. Wash your hands often.  Don t touch your eyes, nose or mouth.  Get a flu shot every year.     Dust Mites  These are tiny bugs that live in cloth or carpet. They are too small to see. Wash sheets and blankets in hot water every week.   Encase pillows and mattress in dust mite proof covers.  Avoid having carpet if you can. If you have carpet, vacuum weekly.   Use a dust mask and HEPA vacuum.   Pollen and Outdoor Mold  Some people are allergic to trees, grass, or weed pollen, or molds. Try to  keep your windows closed.  Limit time out doors when pollen count is high.   Ask you health care provider about taking medicine during allergy season.     Animal Dander  Some people are allergic to skin flakes, urine or saliva from pets with fur or feathers. Keep pets with fur or feathers out of your home.    If you can t keep the pet outdoors, then keep the pet out of your bedroom.  Keep the bedroom door closed.  Keep pets off cloth furniture and away from stuffed toys.     Mice, Rats, and Cockroaches   Some people are allergic to the waste from these pests.   Cover food and garbage.  Clean up spills and food crumbs.  Store grease in the refrigerator.   Keep food out of the bedroom.   Indoor Mold  This can be a trigger if your home has high moisture. Fix leaking faucets, pipes, or other sources of water.   Clean moldy surfaces.  Dehumidify basement if it is damp and smelly.   Smoke, Strong Odors, and Sprays  These can reduce air quality. Stay away from strong odors and sprays, such as perfume, powder, hair spray, paints, smoke incense, paint, cleaning products, candles and new carpet.   Exercise or Sports  Some people with asthma have this trigger. Be active!  Ask your doctor about taking medicine before sports or exercise to prevent symptoms.    Warm up for 5-10 minutes before and after sports or exercise.     Other Triggers of Asthma  Cold air:  Cover your nose and mouth with a scarf.  Sometimes laughing or crying can be a trigger.  Some medicines and food can trigger asthma.

## 2021-10-14 ENCOUNTER — TELEPHONE (OUTPATIENT)
Dept: FAMILY MEDICINE | Facility: OTHER | Age: 36
End: 2021-10-14

## 2021-10-14 NOTE — TELEPHONE ENCOUNTER
Left message for patient to return call.     Was calling to inform patient that I have faxed her biometric screening form for her. Would she like a copy of it? I have sent a copy to our scanning department to be recorded into her chart. I have also placed a copy in RK's folder in filing cabinet in pod #1. If she would like a copy, please make a copy if this and place back into folder to keep to make sure [employer] receives form    Kalyani Rasmussen MA

## 2021-10-14 NOTE — RESULT ENCOUNTER NOTE
Ms. Morrison    Your recent test results are attached.  Improved cholesterol levels compared to last test.  Normal blood glucose levels.    If you have any questions or concerns please contact me via My Chart or call the clinic at [phone]     Thank You  Nadia Stewart MD.

## 2021-11-03 ENCOUNTER — OFFICE VISIT (OUTPATIENT)
Dept: SURGERY | Facility: OTHER | Age: 36
End: 2021-11-03
Attending: FAMILY MEDICINE
Payer: COMMERCIAL

## 2021-11-03 ENCOUNTER — TELEPHONE (OUTPATIENT)
Dept: SURGERY | Facility: CLINIC | Age: 36
End: 2021-11-03

## 2021-11-03 VITALS
TEMPERATURE: 98 F | WEIGHT: 187 LBS | DIASTOLIC BLOOD PRESSURE: 80 MMHG | HEIGHT: 60 IN | BODY MASS INDEX: 36.71 KG/M2 | SYSTOLIC BLOOD PRESSURE: 124 MMHG

## 2021-11-03 DIAGNOSIS — K43.9 VENTRAL HERNIA WITHOUT OBSTRUCTION OR GANGRENE: ICD-10-CM

## 2021-11-03 PROCEDURE — 99203 OFFICE O/P NEW LOW 30 MIN: CPT | Performed by: SURGERY

## 2021-11-03 ASSESSMENT — MIFFLIN-ST. JEOR: SCORE: 1476.56

## 2021-11-03 NOTE — LETTER
"    11/3/2021         RE: Ruby Morrison  [address] [facility] [address]        Dear Colleague,    Thank you for referring your patient, Ruby Morrison, to the [facility]. Please see a copy of my visit note below.    Patient seen in consultation for anjum-umbilical hernia by Dr Stewart    HPI:  Patient is a 36 year old female who noticed an abdominal bulge with discomfort several months ago. She was in the process of moving homes. She has 5 children including triplets about 1 year ago. She has an IUD and has NO plans for future pregnancies. She had  section but no other abdominal operations. She denies any obstructive symptoms. She denies severe pain but when it is \"out\" is is uncomfortable and she has to limit her activities.    Review Of Systems    Skin: negative  Ears/Nose/Throat: negative  Respiratory: No shortness of breath, dyspnea on exertion, cough, or hemoptysis  Cardiovascular: negative  Gastrointestinal: negative  Genitourinary: negative  Musculoskeletal: negative  Neurologic: negative  Hematologic/Lymphatic/Immunologic: negative  Endocrine: negative      Past Medical History:   Diagnosis Date     Asthma      Cat allergies      Encounter for supervision of other normal pregnancy 2015     GERD (gastroesophageal reflux disease)      Group B Streptococcus urinary tract infection affecting pregnancy in first trimester 2017     Hx of previous reproductive problem      Hypertension     during pregnancy     Mixed hyperlipidemia 2019       Past Surgical History:   Procedure Laterality Date      SECTION N/A 10/1/2020    Procedure:  SECTION;  Surgeon: Abigail Burns MD;  Location: [facility]     HYMENOTOMY       LASIK Bilateral 2020    DR. Zarco       Family History   Problem Relation Age of Onset     Migraines Mother      Hypertension Father      Lipids Father      Diabetes Maternal Grandmother      Diabetes Paternal " Grandmother      Cancer Paternal Grandfather         rex       Social History     Socioeconomic History     Marital status:      Spouse name: Not on file     Number of children: Not on file     Years of education: Not on file     Highest education level: Not on file   Occupational History     Not on file   Tobacco Use     Smoking status: Never Smoker     Smokeless tobacco: Never Used   Vaping Use     Vaping Use: Never used   Substance and Sexual Activity     Alcohol use: Not Currently     Alcohol/week: 0.0 standard drinks     Comment: Maybe once every 6 months     Drug use: No     Sexual activity: Not Currently     Partners: Male     Birth control/protection: None   Other Topics Concern     Parent/sibling w/ CABG, MI or angioplasty before 65F 55M? No   Social History Narrative    Caffeine intake/servings daily - soda 1/day    Calcium intake/servings daily - 2/day, cheese, yogurt    Exercise none times weekly    Sunscreen used - Yes    Seatbelts used - Yes    Guns stored in the home - Yes, locked in a cabinet    Self Breast Exam - No    Pap test up to date -  Yes    Eye exam up to date -  Yes    Dental exam up to date -  Yes    DEXA scan up to date -  No    Flex Sig/Colonoscopy up to date -  No    Mammography up to date -  No    Immunizations reviewed and up to date - Yes    Abuse: Current or Past (Physical, Sexual or Emotional) - No    Do you feel safe in your environment - Yes    Do you cope well with stress - Yes    Do you suffer from insomnia - No    Last updated by: Araseli Dickerson  10/30/2017     Social Determinants of Health     Financial Resource Strain:      Difficulty of Paying Living Expenses:    Food Insecurity:      Worried About Running Out of Food in the Last Year:      Ran Out of Food in the Last Year:    Transportation Needs:      Lack of Transportation (Medical):      Lack of Transportation (Non-Medical):    Physical Activity:      Days of Exercise per Week:      Minutes of Exercise  "per Session:    Stress:      Feeling of Stress :    Social Connections:      Frequency of Communication with Friends and Family:      Frequency of Social Gatherings with Friends and Family:      Attends Yarsani Services:      Active Member of Clubs or Organizations:      Attends Club or Organization Meetings:      Marital Status:    Intimate Partner Violence:      Fear of Current or Ex-Partner:      Emotionally Abused:      Physically Abused:      Sexually Abused:        Current Outpatient Medications   Medication Sig Dispense Refill     albuterol (PROAIR HFA/PROVENTIL HFA/VENTOLIN HFA) 108 (90 Base) MCG/ACT inhaler Inhale 2 puffs into the lungs every 4 hours as needed for shortness of breath / dyspnea 1 Inhaler 1     levonorgestrel (MIRENA) 20 MCG/24HR IUD 1 each (20 mcg) by Intrauterine route once for 1 dose 1 each 0     montelukast (SINGULAIR) 10 MG tablet +++NEED ANNUAL EXAM+++TAKE 1 TABLET AT BEDTIME 90 tablet 0     triamcinolone (KENALOG) 0.1 % external ointment Apply topically 2 times daily 80 g 0       Medications and history reviewed    Physical exam:  Vitals: /80   Temp 98  F (36.7  C) (Temporal)   Ht 1.535 m (5' 0.43\")   Wt 84.8 kg (187 lb)   BMI 36.00 kg/m    BMI= Body mass index is 36 kg/m .    Constitutional: Healthy, alert, non-distressed   Head: Normo-cephalic, atraumatic, no lesions, masses or tenderness   Cardiovascular: RRR, no new murmurs, +S1, +S2 heart sounds, no clicks, rubs or gallops   Respiratory: CTAB, no rales, rhonchi or wheezing, equal chest rise, good respiratory effort   Gastrointestinal: Soft, non-tender, non distended, no rebound rigidity or guarding, soft reducible hernia palpated just superior to umbilicus, body habitus makes it difficult to palpate hernia defect itself.  : Deferred  Musculoskeletal: Moves all extremities, normal  strength, no deformities noted   Skin: No suspicious lesions or rashes   Psychiatric: Mentation appears normal, affect appropriate "   Hematologic/Lymphatic/Immunologic: Normal cervical and supraclavicular lymph nodes   Patient able to get up on table without difficulty.    Labs show:  No results found for this or any previous visit (from the past 24 hour(s)).    Imaging shows:  CT done at outlying facility showed anjum-umbilical hernia, per patient    Assessment:     ICD-10-CM    1. Ventral hernia without obstruction or gangrene  K43.9 Adult General Surg Referral     Plan: I recommend robot assisted laparoscopic ventral hernia with mesh. We discussed the procedure in detail. We also discussed the risks, benefits, alternatives and post-op care and restrictions. After our informed discussion we decided to proceed with the proposed surgery.    Risks of surgery discussed including, but not limited to bleeding, infection, recurrence, damage to nerves and what is in the hernia sac.  Risks of anesthesia also discussed..  Although mesh is a better long term repair if it gets infected it must be removed.  If there is evidence of an infection at time of surgery it will be cancelled and rescheduled for when infection has resolved.      Discussed massaging hernia back in and using ice if becomes more painful.  If not able to reduce then go to emergency room.    Peewee Coombs, DO        Again, thank you for allowing me to participate in the care of your patient.        Sincerely,        Peewee Coombs, DO

## 2021-11-03 NOTE — PROGRESS NOTES
"Patient seen in consultation for anjum-umbilical hernia by Dr Stewart    HPI:  Patient is a 36 year old female who noticed an abdominal bulge with discomfort several months ago. She was in the process of moving homes. She has 5 children including triplets about 1 year ago. She has an IUD and has NO plans for future pregnancies. She had  section but no other abdominal operations. She denies any obstructive symptoms. She denies severe pain but when it is \"out\" is is uncomfortable and she has to limit her activities.    Review Of Systems    Skin: negative  Ears/Nose/Throat: negative  Respiratory: No shortness of breath, dyspnea on exertion, cough, or hemoptysis  Cardiovascular: negative  Gastrointestinal: negative  Genitourinary: negative  Musculoskeletal: negative  Neurologic: negative  Hematologic/Lymphatic/Immunologic: negative  Endocrine: negative      Past Medical History:   Diagnosis Date     Asthma      Cat allergies      Encounter for supervision of other normal pregnancy 2015     GERD (gastroesophageal reflux disease)      Group B Streptococcus urinary tract infection affecting pregnancy in first trimester 2017     Hx of previous reproductive problem      Hypertension     during pregnancy     Mixed hyperlipidemia 2019       Past Surgical History:   Procedure Laterality Date      SECTION N/A 10/1/2020    Procedure:  SECTION;  Surgeon: Abigail Burns MD;  Location: [facility]     HYMENOTOMY       LASIK Bilateral 2020    DR. Zarco       Family History   Problem Relation Age of Onset     Migraines Mother      Hypertension Father      Lipids Father      Diabetes Maternal Grandmother      Diabetes Paternal Grandmother      Cancer Paternal Grandfather         luekemia       Social History     Socioeconomic History     Marital status:      Spouse name: Not on file     Number of children: Not on file     Years of education: Not on file     Highest education level: " Not on file   Occupational History     Not on file   Tobacco Use     Smoking status: Never Smoker     Smokeless tobacco: Never Used   Vaping Use     Vaping Use: Never used   Substance and Sexual Activity     Alcohol use: Not Currently     Alcohol/week: 0.0 standard drinks     Comment: Maybe once every 6 months     Drug use: No     Sexual activity: Not Currently     Partners: Male     Birth control/protection: None   Other Topics Concern     Parent/sibling w/ CABG, MI or angioplasty before 65F 55M? No   Social History Narrative    Caffeine intake/servings daily - soda 1/day    Calcium intake/servings daily - 2/day, cheese, yogurt    Exercise none times weekly    Sunscreen used - Yes    Seatbelts used - Yes    Guns stored in the home - Yes, locked in a cabinet    Self Breast Exam - No    Pap test up to date -  Yes    Eye exam up to date -  Yes    Dental exam up to date -  Yes    DEXA scan up to date -  No    Flex Sig/Colonoscopy up to date -  No    Mammography up to date -  No    Immunizations reviewed and up to date - Yes    Abuse: Current or Past (Physical, Sexual or Emotional) - No    Do you feel safe in your environment - Yes    Do you cope well with stress - Yes    Do you suffer from insomnia - No    Last updated by: Araseli Dickerson  10/30/2017     Social Determinants of Health     Financial Resource Strain:      Difficulty of Paying Living Expenses:    Food Insecurity:      Worried About Running Out of Food in the Last Year:      Ran Out of Food in the Last Year:    Transportation Needs:      Lack of Transportation (Medical):      Lack of Transportation (Non-Medical):    Physical Activity:      Days of Exercise per Week:      Minutes of Exercise per Session:    Stress:      Feeling of Stress :    Social Connections:      Frequency of Communication with Friends and Family:      Frequency of Social Gatherings with Friends and Family:      Attends [employer] Services:      Active Member of Clubs or Organizations:   "    Attends Club or Organization Meetings:      Marital Status:    Intimate Partner Violence:      Fear of Current or Ex-Partner:      Emotionally Abused:      Physically Abused:      Sexually Abused:        Current Outpatient Medications   Medication Sig Dispense Refill     albuterol (PROAIR HFA/PROVENTIL HFA/VENTOLIN HFA) 108 (90 Base) MCG/ACT inhaler Inhale 2 puffs into the lungs every 4 hours as needed for shortness of breath / dyspnea 1 Inhaler 1     levonorgestrel (MIRENA) 20 MCG/24HR IUD 1 each (20 mcg) by Intrauterine route once for 1 dose 1 each 0     montelukast (SINGULAIR) 10 MG tablet +++NEED ANNUAL EXAM+++TAKE 1 TABLET AT BEDTIME 90 tablet 0     triamcinolone (KENALOG) 0.1 % external ointment Apply topically 2 times daily 80 g 0       Medications and history reviewed    Physical exam:  Vitals: /80   Temp 98  F (36.7  C) (Temporal)   Ht 1.535 m (5' 0.43\")   Wt 84.8 kg (187 lb)   BMI 36.00 kg/m    BMI= Body mass index is 36 kg/m .    Constitutional: Healthy, alert, non-distressed   Head: Normo-cephalic, atraumatic, no lesions, masses or tenderness   Cardiovascular: RRR, no new murmurs, +S1, +S2 heart sounds, no clicks, rubs or gallops   Respiratory: CTAB, no rales, rhonchi or wheezing, equal chest rise, good respiratory effort   Gastrointestinal: Soft, non-tender, non distended, no rebound rigidity or guarding, soft reducible hernia palpated just superior to umbilicus, body habitus makes it difficult to palpate hernia defect itself.  : Deferred  Musculoskeletal: Moves all extremities, normal  strength, no deformities noted   Skin: No suspicious lesions or rashes   Psychiatric: Mentation appears normal, affect appropriate   Hematologic/Lymphatic/Immunologic: Normal cervical and supraclavicular lymph nodes   Patient able to get up on table without difficulty.    Labs show:  No results found for this or any previous visit (from the past 24 hour(s)).    Imaging shows:  [facility] done at [facility]" facility showed anjum-umbilical hernia, per patient    Assessment:     ICD-10-CM    1. Ventral hernia without obstruction or gangrene  K43.9 Adult General Surg Referral     Plan: I recommend robot assisted laparoscopic ventral hernia with mesh. We discussed the procedure in detail. We also discussed the risks, benefits, alternatives and post-op care and restrictions. After our informed discussion we decided to proceed with the proposed surgery.    Risks of surgery discussed including, but not limited to bleeding, infection, recurrence, damage to nerves and what is in the hernia sac.  Risks of anesthesia also discussed..  Although mesh is a better long term repair if it gets infected it must be removed.  If there is evidence of an infection at time of surgery it will be cancelled and rescheduled for when infection has resolved.      Discussed massaging hernia back in and using ice if becomes more painful.  If not able to reduce then go to emergency room.    Peewee Coombs, DO

## 2021-11-04 DIAGNOSIS — Z11.59 ENCOUNTER FOR SCREENING FOR OTHER VIRAL DISEASES: ICD-10-CM

## 2021-11-04 NOTE — TELEPHONE ENCOUNTER
Type of surgery: HERNIORRHAPHY, VENTRAL, ROBOT-ASSISTED with mesh    Location of surgery: [facility]  Date and time of surgery: 12/3  Surgeon: Malvin  Pre-Op Appt Date: 11/30  Post-Op Appt Date: 12/15   Packet sent out: Yes  Pre-cert/Authorization completed:  Not Applicable  Date: na

## 2021-11-26 NOTE — PROGRESS NOTES
[address]  [facility] [address]  Phone: [phone]  Primary Provider: Lorenzo Brown        PREOPERATIVE EVALUATION:  Today's date: 11/30/2021    Ruby Morrison is a 36 year old female who presents for a preoperative evaluation.    Surgical Information:  Surgery/Procedure: HERNIORRHAPHY, VENTRAL, ROBOT-ASSISTED with mesh  Surgery Location: [facility]   Surgeon: TAMERA  Surgery Date: 12/03  Time of Surgery: 12PM  Where patient plans to recover: At home with family  Fax number for surgical facility: Note does not need to be faxed, will be available electronically in Epic.    Type of Anesthesia Anticipated: General    Assessment & Plan     The proposed surgical procedure is considered INTERMEDIATE risk.    Problem List Items Addressed This Visit     Mild persistent asthma    Chronic hypertension - Primary    Mixed hyperlipidemia      Other Visit Diagnoses     Need for prophylactic vaccination and inoculation against influenza        Preop general physical exam        Anemia, unspecified type        Relevant Orders    Hemoglobin               Risks and Recommendations:  The patient has the following additional risks and recommendations for perioperative complications:   - No identified additional risk factors other than previously addressed    Medication Instructions:  Patient is to take all scheduled medications on the day of surgery    RECOMMENDATION:  APPROVAL GIVEN to proceed with proposed procedure, without further diagnostic evaluation.        Subjective     HPI related to upcoming procedure: patient with history of ventral hernia following a triplet pregnancy is scheduled for herniorrhaphy and is here for preop eval.    Preop Questions 11/30/2021   1. Have you ever had a heart attack or stroke? No   2. Have you ever had surgery on your heart or blood vessels, such as a stent placement, a coronary artery bypass, or surgery on an artery in  your head, neck, heart, or legs? No   3. Do you have chest pain with activity? No   4. Do you have a history of  heart failure? No   5. Do you currently have a cold, bronchitis or symptoms of other infection? No   6. Do you have a cough, shortness of breath, or wheezing? No   7. Do you or anyone in your family have previous history of blood clots? No   8. Do you or does anyone in your family have a serious bleeding problem such as prolonged bleeding following surgeries or cuts? No   9. Have you ever had problems with anemia or been told to take iron pills? No   10. Have you had any abnormal blood loss such as black, tarry or bloody stools, or abnormal vaginal bleeding? No   11. Have you ever had a blood transfusion? YES -  Following her triplet pregnancy.   11a. Have you ever had a transfusion reaction? No   12. Are you willing to have a blood transfusion if it is medically needed before, during, or after your surgery? Yes   13. Have you or any of your relatives ever had problems with anesthesia? No   14. Do you have sleep apnea, excessive snoring or daytime drowsiness? No   15. Do you have any artifical heart valves or other implanted medical devices like a pacemaker, defibrillator, or continuous glucose monitor? No   16. Do you have artificial joints? No   17. Are you allergic to latex? No   18. Is there any chance that you may be pregnant? No     Health Care Directive:  Patient does not have a Health Care Directive or Living Will: Discussed advance care planning with patient; information given to patient to review.    Preoperative Review of :   reviewed - no record of controlled substances prescribed.      Status of Chronic Conditions:  See problem list for active medical problems.  Problems all longstanding and stable, except as noted/documented.  See ROS for pertinent symptoms related to these conditions.    ASTHMA - Patient has a longstanding history of moderate-severe Asthma . Patient has been doing  well overall noting NO SYMPTOMS and continues on medication regimen consisting of Singulair and albuterol  without adverse reactions or side effects.     HYPERLIPIDEMIA - Patient has a long history of significant Hyperlipidemia not currently on meds    HYPERTENSION - Patient has longstanding history of HTN , currently denies any symptoms referable to elevated blood pressure. Specifically denies chest pain, palpitations, dyspnea, orthopnea, PND or peripheral edema. Blood pressure readings have been in normal range. Currently diet controlled.     Review of Systems  CONSTITUTIONAL: NEGATIVE for fever, chills, change in weight  INTEGUMENTARY/SKIN: NEGATIVE for worrisome rashes, moles or lesions  EYES: NEGATIVE for vision changes or irritation  ENT/MOUTH: NEGATIVE for ear, mouth and throat problems  RESP: NEGATIVE for significant cough or SOB  CV: NEGATIVE for chest pain, palpitations or peripheral edema  GI: NEGATIVE for nausea, abdominal pain, heartburn, or change in bowel habits  : NEGATIVE for frequency, dysuria, or hematuria  MUSCULOSKELETAL: NEGATIVE for significant arthralgias or myalgia  NEURO: NEGATIVE for weakness, dizziness or paresthesias  ENDOCRINE: NEGATIVE for temperature intolerance, skin/hair changes  HEME: NEGATIVE for bleeding problems  PSYCHIATRIC: NEGATIVE for changes in mood or affect    Patient Active Problem List    Diagnosis Date Noted     Encounter for IUD insertion 10/13/2021     Priority: Medium     Mixed hyperlipidemia 11/05/2019     Priority: Medium     Morbid obesity (H) 07/10/2018     Priority: Medium     Chronic hypertension 04/13/2018     Priority: Medium     PCOS (polycystic ovarian syndrome) 01/21/2015     Priority: Medium     Mild persistent asthma 10/25/2011     Priority: Medium     Starting at age 6       Environmental allergies 10/25/2011     Priority: Medium     Dust, mold, cat, dog, horse,           Past Medical History:   Diagnosis Date     Asthma      Cat allergies       Encounter for supervision of other normal pregnancy 2015     GERD (gastroesophageal reflux disease)      Group B Streptococcus urinary tract infection affecting pregnancy in first trimester 2017     Hx of previous reproductive problem      Hypertension     during pregnancy     Mixed hyperlipidemia 2019     Past Surgical History:   Procedure Laterality Date      SECTION N/A 10/1/2020    Procedure:  SECTION;  Surgeon: Abigail Burns MD;  [facility]     HYMENOTOMY       LASIK Bilateral 2020    DR. Zarco     Current Outpatient Medications   Medication Sig Dispense Refill     albuterol (PROAIR HFA/PROVENTIL HFA/VENTOLIN HFA) 108 (90 Base) MCG/ACT inhaler Inhale 2 puffs into the lungs every 4 hours as needed for shortness of breath / dyspnea 1 Inhaler 1     levonorgestrel (MIRENA) 20 MCG/24HR IUD 1 each (20 mcg) by Intrauterine route once for 1 dose 1 each 0     montelukast (SINGULAIR) 10 MG tablet +++NEED ANNUAL EXAM+++TAKE 1 TABLET AT BEDTIME 90 tablet 0     triamcinolone (KENALOG) 0.1 % external ointment Apply topically 2 times daily 80 g 0       No Known Allergies     Social History     Tobacco Use     Smoking status: Never Smoker     Smokeless tobacco: Never Used   Substance Use Topics     Alcohol use: Not Currently     Alcohol/week: 0.0 standard drinks     Comment: Maybe once every 6 months     Family History   Problem Relation Age of Onset     Migraines Mother      Hypertension Father      Lipids Father      Diabetes Maternal Grandmother      Diabetes Paternal Grandmother      Cancer Paternal Grandfather         luekemia     History   Drug Use No         Objective     There were no vitals taken for this visit.    Physical Exam    GENERAL APPEARANCE: healthy, alert and no distress     EYES: EOMI, PERRL     HENT: ear canals and TM's normal and nose and mouth without ulcers or lesions     NECK: no adenopathy, no asymmetry, masses, or scars and thyroid normal to  palpation     RESP: lungs clear to auscultation - no rales, rhonchi or wheezes     CV: regular rates and rhythm, normal S1 S2, no S3 or S4 and no murmur, click or rub     ABDOMEN:  soft, nontender, no HSM or masses and bowel sounds normal     MS: extremities normal- no gross deformities noted, no evidence of inflammation in joints, FROM in all extremities.     SKIN: no suspicious lesions or rashes     NEURO: Normal strength and tone, sensory exam grossly normal, mentation intact and speech normal     PSYCH: mentation appears normal. and affect normal/bright     LYMPHATICS: No cervical adenopathy    Recent Labs   Lab Test 10/05/20  0657 10/03/20  0824 10/02/20  0707 10/01/20  1603 10/01/20  1105 09/17/20  1043 04/30/20  1550 01/24/20  1122   HGB 7.7* 7.3*   < > 8.8* 9.9* 11.3*   < >  --    PLT  --   --   --  212 243 273   < >  --    NA  --   --   --   --   --  137  --   --    POTASSIUM  --   --   --   --   --  3.9  --   --    CR  --   --   --   --  0.42* 0.45*   < > 0.59   A1C  --   --   --   --   --   --   --  5.8*    < > = values in this interval not displayed.        Diagnostics:  Labs pending at this time.  Results will be reviewed when available.   No EKG required, no history of coronary heart disease, significant arrhythmia, peripheral arterial disease or other structural heart disease.    Revised Cardiac Risk Index (RCRI):  The patient has the following serious cardiovascular risks for perioperative complications:   - No serious cardiac risks = 0 points     RCRI Interpretation: 0 points: Class I (very low risk - 0.4% complication rate)           Signed Electronically by: Nadia Stewart MD  Copy of this evaluation report is provided to requesting physician.

## 2021-11-26 NOTE — H&P (VIEW-ONLY)
[address]  [facility] [address]  Phone: [phone]  Primary Provider: Lorenzo Brown        PREOPERATIVE EVALUATION:  Today's date: 11/30/2021    Ruby Morrison is a 36 year old female who presents for a preoperative evaluation.    Surgical Information:  Surgery/Procedure: HERNIORRHAPHY, VENTRAL, ROBOT-ASSISTED with mesh  Surgery Location: [facility]   Surgeon: TAMERA  Surgery Date: 12/03  Time of Surgery: 12PM  Where patient plans to recover: At home with family  Fax number for surgical facility: Note does not need to be faxed, will be available electronically in Epic.    Type of Anesthesia Anticipated: General    Assessment & Plan     The proposed surgical procedure is considered INTERMEDIATE risk.    Problem List Items Addressed This Visit     Mild persistent asthma    Chronic hypertension - Primary    Mixed hyperlipidemia      Other Visit Diagnoses     Need for prophylactic vaccination and inoculation against influenza        Preop general physical exam        Anemia, unspecified type        Relevant Orders    Hemoglobin               Risks and Recommendations:  The patient has the following additional risks and recommendations for perioperative complications:   - No identified additional risk factors other than previously addressed    Medication Instructions:  Patient is to take all scheduled medications on the day of surgery    RECOMMENDATION:  APPROVAL GIVEN to proceed with proposed procedure, without further diagnostic evaluation.        Subjective     HPI related to upcoming procedure: patient with history of ventral hernia following a triplet pregnancy is scheduled for herniorrhaphy and is here for preop eval.    Preop Questions 11/30/2021   1. Have you ever had a heart attack or stroke? No   2. Have you ever had surgery on your heart or blood vessels, such as a stent placement, a coronary artery bypass, or surgery on an artery in  your head, neck, heart, or legs? No   3. Do you have chest pain with activity? No   4. Do you have a history of  heart failure? No   5. Do you currently have a cold, bronchitis or symptoms of other infection? No   6. Do you have a cough, shortness of breath, or wheezing? No   7. Do you or anyone in your family have previous history of blood clots? No   8. Do you or does anyone in your family have a serious bleeding problem such as prolonged bleeding following surgeries or cuts? No   9. Have you ever had problems with anemia or been told to take iron pills? No   10. Have you had any abnormal blood loss such as black, tarry or bloody stools, or abnormal vaginal bleeding? No   11. Have you ever had a blood transfusion? YES -  Following her triplet pregnancy.   11a. Have you ever had a transfusion reaction? No   12. Are you willing to have a blood transfusion if it is medically needed before, during, or after your surgery? Yes   13. Have you or any of your relatives ever had problems with anesthesia? No   14. Do you have sleep apnea, excessive snoring or daytime drowsiness? No   15. Do you have any artifical heart valves or other implanted medical devices like a pacemaker, defibrillator, or continuous glucose monitor? No   16. Do you have artificial joints? No   17. Are you allergic to latex? No   18. Is there any chance that you may be pregnant? No     Health Care Directive:  Patient does not have a Health Care Directive or Living Will: Discussed advance care planning with patient; information given to patient to review.    Preoperative Review of :   reviewed - no record of controlled substances prescribed.      Status of Chronic Conditions:  See problem list for active medical problems.  Problems all longstanding and stable, except as noted/documented.  See ROS for pertinent symptoms related to these conditions.    ASTHMA - Patient has a longstanding history of moderate-severe Asthma . Patient has been doing  well overall noting NO SYMPTOMS and continues on medication regimen consisting of Singulair and albuterol  without adverse reactions or side effects.     HYPERLIPIDEMIA - Patient has a long history of significant Hyperlipidemia not currently on meds    HYPERTENSION - Patient has longstanding history of HTN , currently denies any symptoms referable to elevated blood pressure. Specifically denies chest pain, palpitations, dyspnea, orthopnea, PND or peripheral edema. Blood pressure readings have been in normal range. Currently diet controlled.     Review of Systems  CONSTITUTIONAL: NEGATIVE for fever, chills, change in weight  INTEGUMENTARY/SKIN: NEGATIVE for worrisome rashes, moles or lesions  EYES: NEGATIVE for vision changes or irritation  ENT/MOUTH: NEGATIVE for ear, mouth and throat problems  RESP: NEGATIVE for significant cough or SOB  CV: NEGATIVE for chest pain, palpitations or peripheral edema  GI: NEGATIVE for nausea, abdominal pain, heartburn, or change in bowel habits  : NEGATIVE for frequency, dysuria, or hematuria  MUSCULOSKELETAL: NEGATIVE for significant arthralgias or myalgia  NEURO: NEGATIVE for weakness, dizziness or paresthesias  ENDOCRINE: NEGATIVE for temperature intolerance, skin/hair changes  HEME: NEGATIVE for bleeding problems  PSYCHIATRIC: NEGATIVE for changes in mood or affect    Patient Active Problem List    Diagnosis Date Noted     Encounter for IUD insertion 10/13/2021     Priority: Medium     Mixed hyperlipidemia 11/05/2019     Priority: Medium     Morbid obesity (H) 07/10/2018     Priority: Medium     Chronic hypertension 04/13/2018     Priority: Medium     PCOS (polycystic ovarian syndrome) 01/21/2015     Priority: Medium     Mild persistent asthma 10/25/2011     Priority: Medium     Starting at age 6       Environmental allergies 10/25/2011     Priority: Medium     Dust, mold, cat, dog, horse,           Past Medical History:   Diagnosis Date     Asthma      Cat allergies       Encounter for supervision of other normal pregnancy 2015     GERD (gastroesophageal reflux disease)      Group B Streptococcus urinary tract infection affecting pregnancy in first trimester 2017     Hx of previous reproductive problem      Hypertension     during pregnancy     Mixed hyperlipidemia 2019     Past Surgical History:   Procedure Laterality Date      SECTION N/A 10/1/2020    Procedure:  SECTION;  Surgeon: Abigail Burns MD;  Location: [facility]     HYMENOTOMY       LASIK Bilateral 2020    DR. Zarco     Current Outpatient Medications   Medication Sig Dispense Refill     albuterol (PROAIR HFA/PROVENTIL HFA/VENTOLIN HFA) 108 (90 Base) MCG/ACT inhaler Inhale 2 puffs into the lungs every 4 hours as needed for shortness of breath / dyspnea 1 Inhaler 1     levonorgestrel (MIRENA) 20 MCG/24HR IUD 1 each (20 mcg) by Intrauterine route once for 1 dose 1 each 0     montelukast (SINGULAIR) 10 MG tablet +++NEED ANNUAL EXAM+++TAKE 1 TABLET AT BEDTIME 90 tablet 0     triamcinolone (KENALOG) 0.1 % external ointment Apply topically 2 times daily 80 g 0       No Known Allergies     Social History     Tobacco Use     Smoking status: Never Smoker     Smokeless tobacco: Never Used   Substance Use Topics     Alcohol use: Not Currently     Alcohol/week: 0.0 standard drinks     Comment: Maybe once every 6 months     Family History   Problem Relation Age of Onset     Migraines Mother      Hypertension Father      Lipids Father      Diabetes Maternal Grandmother      Diabetes Paternal Grandmother      Cancer Paternal Grandfather         luekemia     History   Drug Use No         Objective     There were no vitals taken for this visit.    Physical Exam    GENERAL APPEARANCE: healthy, alert and no distress     EYES: EOMI, PERRL     HENT: ear canals and TM's normal and nose and mouth without ulcers or lesions     NECK: no adenopathy, no asymmetry, masses, or scars and thyroid normal to  palpation     RESP: lungs clear to auscultation - no rales, rhonchi or wheezes     CV: regular rates and rhythm, normal S1 S2, no S3 or S4 and no murmur, click or rub     ABDOMEN:  soft, nontender, no HSM or masses and bowel sounds normal     MS: extremities normal- no gross deformities noted, no evidence of inflammation in joints, FROM in all extremities.     SKIN: no suspicious lesions or rashes     NEURO: Normal strength and tone, sensory exam grossly normal, mentation intact and speech normal     PSYCH: mentation appears normal. and affect normal/bright     LYMPHATICS: No cervical adenopathy    Recent Labs   Lab Test 10/05/20  0657 10/03/20  0824 10/02/20  0707 10/01/20  1603 10/01/20  1105 09/17/20  1043 04/30/20  1550 01/24/20  1122   HGB 7.7* 7.3*   < > 8.8* 9.9* 11.3*   < >  --    PLT  --   --   --  212 243 273   < >  --    NA  --   --   --   --   --  137  --   --    POTASSIUM  --   --   --   --   --  3.9  --   --    CR  --   --   --   --  0.42* 0.45*   < > 0.59   A1C  --   --   --   --   --   --   --  5.8*    < > = values in this interval not displayed.        Diagnostics:  Labs pending at this time.  Results will be reviewed when available.   No EKG required, no history of coronary heart disease, significant arrhythmia, peripheral arterial disease or other structural heart disease.    Revised Cardiac Risk Index (RCRI):  The patient has the following serious cardiovascular risks for perioperative complications:   - No serious cardiac risks = 0 points     RCRI Interpretation: 0 points: Class I (very low risk - 0.4% complication rate)           Signed Electronically by: Nadia Stewart MD  Copy of this evaluation report is provided to requesting physician.

## 2021-11-30 ENCOUNTER — OFFICE VISIT (OUTPATIENT)
Dept: FAMILY MEDICINE | Facility: OTHER | Age: 36
End: 2021-11-30
Payer: COMMERCIAL

## 2021-11-30 ENCOUNTER — LAB (OUTPATIENT)
Dept: LAB | Facility: OTHER | Age: 36
End: 2021-11-30
Payer: COMMERCIAL

## 2021-11-30 VITALS
TEMPERATURE: 97.7 F | DIASTOLIC BLOOD PRESSURE: 82 MMHG | SYSTOLIC BLOOD PRESSURE: 116 MMHG | OXYGEN SATURATION: 100 % | RESPIRATION RATE: 18 BRPM | WEIGHT: 193 LBS | BODY MASS INDEX: 37.16 KG/M2 | HEART RATE: 82 BPM

## 2021-11-30 DIAGNOSIS — Z11.59 NEED FOR HEPATITIS C SCREENING TEST: ICD-10-CM

## 2021-11-30 DIAGNOSIS — Z01.818 PREOP GENERAL PHYSICAL EXAM: ICD-10-CM

## 2021-11-30 DIAGNOSIS — Z23 NEED FOR PROPHYLACTIC VACCINATION AND INOCULATION AGAINST INFLUENZA: ICD-10-CM

## 2021-11-30 DIAGNOSIS — Z11.59 ENCOUNTER FOR SCREENING FOR OTHER VIRAL DISEASES: ICD-10-CM

## 2021-11-30 DIAGNOSIS — I10 CHRONIC HYPERTENSION: Primary | ICD-10-CM

## 2021-11-30 DIAGNOSIS — D64.9 ANEMIA, UNSPECIFIED TYPE: ICD-10-CM

## 2021-11-30 DIAGNOSIS — E78.2 MIXED HYPERLIPIDEMIA: ICD-10-CM

## 2021-11-30 DIAGNOSIS — J45.30 MILD PERSISTENT ASTHMA WITHOUT COMPLICATION: ICD-10-CM

## 2021-11-30 LAB
HGB BLD-MCNC: 14.2 G/DL (ref 11.7–15.7)
SARS-COV-2 RNA RESP QL NAA+PROBE: NEGATIVE

## 2021-11-30 PROCEDURE — 86803 HEPATITIS C AB TEST: CPT | Performed by: FAMILY MEDICINE

## 2021-11-30 PROCEDURE — U0003 INFECTIOUS AGENT DETECTION BY NUCLEIC ACID (DNA OR RNA); SEVERE ACUTE RESPIRATORY SYNDROME CORONAVIRUS 2 (SARS-COV-2) (CORONAVIRUS DISEASE [COVID-19]), AMPLIFIED PROBE TECHNIQUE, MAKING USE OF HIGH THROUGHPUT TECHNOLOGIES AS DESCRIBED BY CMS-2020-01-R: HCPCS

## 2021-11-30 PROCEDURE — U0005 INFEC AGEN DETEC AMPLI PROBE: HCPCS

## 2021-11-30 PROCEDURE — 36415 COLL VENOUS BLD VENIPUNCTURE: CPT | Performed by: FAMILY MEDICINE

## 2021-11-30 PROCEDURE — 99214 OFFICE O/P EST MOD 30 MIN: CPT | Performed by: FAMILY MEDICINE

## 2021-11-30 PROCEDURE — 85018 HEMOGLOBIN: CPT | Performed by: FAMILY MEDICINE

## 2021-11-30 NOTE — PATIENT INSTRUCTIONS

## 2021-11-30 NOTE — RESULT ENCOUNTER NOTE
Ms. Morrison    Your recent test results are attached.Normal hemoglobin levels.    If you have any questions or concerns please contact me via My Chart or call the clinic at [phone]     Thank You  Nadia Stewart MD.

## 2021-12-01 LAB — HCV AB SERPL QL IA: NONREACTIVE

## 2021-12-01 NOTE — RESULT ENCOUNTER NOTE
MsTram Prince    Your recent test results are attached.  Negative hepatitis C test.    If you have any questions or concerns please contact me via My Chart or call the clinic at [phone]     Thank You  Nadia Stewart MD.

## 2021-12-02 ENCOUNTER — ANESTHESIA EVENT (OUTPATIENT)
Dept: SURGERY | Facility: CLINIC | Age: 36
End: 2021-12-02
Payer: COMMERCIAL

## 2021-12-03 ENCOUNTER — ANESTHESIA (OUTPATIENT)
Dept: SURGERY | Facility: CLINIC | Age: 36
End: 2021-12-03
Payer: COMMERCIAL

## 2021-12-03 ENCOUNTER — HOSPITAL ENCOUNTER (OUTPATIENT)
Facility: CLINIC | Age: 36
Discharge: HOME | End: 2021-12-03
Attending: SURGERY | Admitting: SURGERY
Payer: COMMERCIAL

## 2021-12-03 ENCOUNTER — NURSE TRIAGE (OUTPATIENT)
Dept: NURSING | Facility: CLINIC | Age: 36
End: 2021-12-03

## 2021-12-03 VITALS
OXYGEN SATURATION: 97 % | SYSTOLIC BLOOD PRESSURE: 106 MMHG | RESPIRATION RATE: 16 BRPM | HEART RATE: 73 BPM | TEMPERATURE: 98.2 F | DIASTOLIC BLOOD PRESSURE: 72 MMHG

## 2021-12-03 DIAGNOSIS — Z98.890 S/P ROBOT-ASSISTED SURGICAL PROCEDURE: Primary | ICD-10-CM

## 2021-12-03 LAB — HCG UR QL: NEGATIVE

## 2021-12-03 PROCEDURE — 710N000010 HC RECOVERY PHASE 1, LEVEL 2, PER MIN: Performed by: SURGERY

## 2021-12-03 PROCEDURE — 370N000017 HC ANESTHESIA TECHNICAL FEE, PER MIN: Performed by: SURGERY

## 2021-12-03 PROCEDURE — S2900 ROBOTIC SURGICAL SYSTEM: HCPCS | Performed by: SURGERY

## 2021-12-03 PROCEDURE — 258N000003 HC RX IP 258 OP 636: Performed by: NURSE ANESTHETIST, CERTIFIED REGISTERED

## 2021-12-03 PROCEDURE — 999N000141 HC STATISTIC PRE-PROCEDURE NURSING ASSESSMENT: Performed by: SURGERY

## 2021-12-03 PROCEDURE — 360N000080 HC SURGERY LEVEL 7, PER MIN: Performed by: SURGERY

## 2021-12-03 PROCEDURE — C1781 MESH (IMPLANTABLE): HCPCS | Performed by: SURGERY

## 2021-12-03 PROCEDURE — 250N000013 HC RX MED GY IP 250 OP 250 PS 637: Performed by: NURSE ANESTHETIST, CERTIFIED REGISTERED

## 2021-12-03 PROCEDURE — 250N000011 HC RX IP 250 OP 636: Performed by: SURGERY

## 2021-12-03 PROCEDURE — 250N000011 HC RX IP 250 OP 636: Performed by: NURSE ANESTHETIST, CERTIFIED REGISTERED

## 2021-12-03 PROCEDURE — 250N000009 HC RX 250: Performed by: NURSE ANESTHETIST, CERTIFIED REGISTERED

## 2021-12-03 PROCEDURE — 49652 PR LAP VENT/ABD HERNIA REPAIR: CPT | Performed by: SURGERY

## 2021-12-03 PROCEDURE — 250N000026 HC DESFLURANE, PER MIN: Performed by: SURGERY

## 2021-12-03 PROCEDURE — 272N000001 HC OR GENERAL SUPPLY STERILE: Performed by: SURGERY

## 2021-12-03 PROCEDURE — 81025 URINE PREGNANCY TEST: CPT | Performed by: NURSE ANESTHETIST, CERTIFIED REGISTERED

## 2021-12-03 PROCEDURE — 710N000012 HC RECOVERY PHASE 2, PER MINUTE: Performed by: SURGERY

## 2021-12-03 PROCEDURE — 250N000009 HC RX 250: Performed by: SURGERY

## 2021-12-03 DEVICE — MESH VENTRALIGHT ST W/ECHO POS SYSTEM 4.5" CIRCLE 5955450: Type: IMPLANTABLE DEVICE | Site: ABDOMEN | Status: FUNCTIONAL

## 2021-12-03 RX ORDER — FENTANYL CITRATE 50 UG/ML
INJECTION, SOLUTION INTRAMUSCULAR; INTRAVENOUS PRN
Status: DISCONTINUED | OUTPATIENT
Start: 2021-12-03 | End: 2021-12-03

## 2021-12-03 RX ORDER — PROPOFOL 10 MG/ML
INJECTION, EMULSION INTRAVENOUS PRN
Status: DISCONTINUED | OUTPATIENT
Start: 2021-12-03 | End: 2021-12-03

## 2021-12-03 RX ORDER — HYDROMORPHONE HYDROCHLORIDE 1 MG/ML
0.4 INJECTION, SOLUTION INTRAMUSCULAR; INTRAVENOUS; SUBCUTANEOUS EVERY 5 MIN PRN
Status: DISCONTINUED | OUTPATIENT
Start: 2021-12-03 | End: 2021-12-03 | Stop reason: HOSPADM

## 2021-12-03 RX ORDER — SCOLOPAMINE TRANSDERMAL SYSTEM 1 MG/1
1 PATCH, EXTENDED RELEASE TRANSDERMAL
Status: DISCONTINUED | OUTPATIENT
Start: 2021-12-03 | End: 2021-12-03 | Stop reason: HOSPADM

## 2021-12-03 RX ORDER — SODIUM CHLORIDE, SODIUM LACTATE, POTASSIUM CHLORIDE, CALCIUM CHLORIDE 600; 310; 30; 20 MG/100ML; MG/100ML; MG/100ML; MG/100ML
INJECTION, SOLUTION INTRAVENOUS CONTINUOUS
Status: DISCONTINUED | OUTPATIENT
Start: 2021-12-03 | End: 2021-12-03 | Stop reason: HOSPADM

## 2021-12-03 RX ORDER — DIMENHYDRINATE 50 MG/ML
12.5 INJECTION, SOLUTION INTRAMUSCULAR; INTRAVENOUS
Status: COMPLETED | OUTPATIENT
Start: 2021-12-03 | End: 2021-12-03

## 2021-12-03 RX ORDER — FENTANYL CITRATE 50 UG/ML
50 INJECTION, SOLUTION INTRAMUSCULAR; INTRAVENOUS EVERY 5 MIN PRN
Status: DISCONTINUED | OUTPATIENT
Start: 2021-12-03 | End: 2021-12-03 | Stop reason: HOSPADM

## 2021-12-03 RX ORDER — MEPERIDINE HYDROCHLORIDE 25 MG/ML
12.5 INJECTION INTRAMUSCULAR; INTRAVENOUS; SUBCUTANEOUS
Status: DISCONTINUED | OUTPATIENT
Start: 2021-12-03 | End: 2021-12-03 | Stop reason: HOSPADM

## 2021-12-03 RX ORDER — ONDANSETRON 2 MG/ML
INJECTION INTRAMUSCULAR; INTRAVENOUS PRN
Status: DISCONTINUED | OUTPATIENT
Start: 2021-12-03 | End: 2021-12-03

## 2021-12-03 RX ORDER — ONDANSETRON 4 MG/1
4 TABLET, ORALLY DISINTEGRATING ORAL EVERY 30 MIN PRN
Status: DISCONTINUED | OUTPATIENT
Start: 2021-12-03 | End: 2021-12-03 | Stop reason: HOSPADM

## 2021-12-03 RX ORDER — ACETAMINOPHEN 325 MG/1
975 TABLET ORAL ONCE
Status: COMPLETED | OUTPATIENT
Start: 2021-12-03 | End: 2021-12-03

## 2021-12-03 RX ORDER — PROPOFOL 10 MG/ML
INJECTION, EMULSION INTRAVENOUS CONTINUOUS PRN
Status: DISCONTINUED | OUTPATIENT
Start: 2021-12-03 | End: 2021-12-03

## 2021-12-03 RX ORDER — OXYCODONE AND ACETAMINOPHEN 5; 325 MG/1; MG/1
1-2 TABLET ORAL EVERY 4 HOURS PRN
Qty: 16 TABLET | Refills: 0 | Status: SHIPPED | OUTPATIENT
Start: 2021-12-03 | End: 2021-12-15

## 2021-12-03 RX ORDER — DEXAMETHASONE SODIUM PHOSPHATE 10 MG/ML
INJECTION, SOLUTION INTRAMUSCULAR; INTRAVENOUS PRN
Status: DISCONTINUED | OUTPATIENT
Start: 2021-12-03 | End: 2021-12-03

## 2021-12-03 RX ORDER — LIDOCAINE HYDROCHLORIDE 20 MG/ML
INJECTION, SOLUTION INFILTRATION; PERINEURAL PRN
Status: DISCONTINUED | OUTPATIENT
Start: 2021-12-03 | End: 2021-12-03

## 2021-12-03 RX ORDER — KETOROLAC TROMETHAMINE 30 MG/ML
INJECTION, SOLUTION INTRAMUSCULAR; INTRAVENOUS PRN
Status: DISCONTINUED | OUTPATIENT
Start: 2021-12-03 | End: 2021-12-03

## 2021-12-03 RX ORDER — OXYCODONE AND ACETAMINOPHEN 5; 325 MG/1; MG/1
2 TABLET ORAL
Status: DISCONTINUED | OUTPATIENT
Start: 2021-12-03 | End: 2021-12-03 | Stop reason: HOSPADM

## 2021-12-03 RX ORDER — OXYCODONE HYDROCHLORIDE 5 MG/1
5 TABLET ORAL EVERY 4 HOURS PRN
Status: DISCONTINUED | OUTPATIENT
Start: 2021-12-03 | End: 2021-12-03 | Stop reason: HOSPADM

## 2021-12-03 RX ORDER — FENTANYL CITRATE 50 UG/ML
25 INJECTION, SOLUTION INTRAMUSCULAR; INTRAVENOUS
Status: DISCONTINUED | OUTPATIENT
Start: 2021-12-03 | End: 2021-12-03 | Stop reason: HOSPADM

## 2021-12-03 RX ORDER — ONDANSETRON 2 MG/ML
4 INJECTION INTRAMUSCULAR; INTRAVENOUS EVERY 30 MIN PRN
Status: DISCONTINUED | OUTPATIENT
Start: 2021-12-03 | End: 2021-12-03 | Stop reason: HOSPADM

## 2021-12-03 RX ORDER — CEFAZOLIN SODIUM 2 G/100ML
2 INJECTION, SOLUTION INTRAVENOUS
Status: DISCONTINUED | OUTPATIENT
Start: 2021-12-03 | End: 2021-12-03 | Stop reason: HOSPADM

## 2021-12-03 RX ORDER — BUPIVACAINE HYDROCHLORIDE AND EPINEPHRINE 2.5; 5 MG/ML; UG/ML
INJECTION, SOLUTION EPIDURAL; INFILTRATION; INTRACAUDAL; PERINEURAL PRN
Status: DISCONTINUED | OUTPATIENT
Start: 2021-12-03 | End: 2021-12-03 | Stop reason: HOSPADM

## 2021-12-03 RX ORDER — CEFAZOLIN SODIUM 2 G/100ML
2 INJECTION, SOLUTION INTRAVENOUS SEE ADMIN INSTRUCTIONS
Status: DISCONTINUED | OUTPATIENT
Start: 2021-12-03 | End: 2021-12-03 | Stop reason: HOSPADM

## 2021-12-03 RX ADMIN — ACETAMINOPHEN 975 MG: 325 TABLET, FILM COATED ORAL at 16:52

## 2021-12-03 RX ADMIN — MIDAZOLAM 1 MG: 1 INJECTION INTRAMUSCULAR; INTRAVENOUS at 12:03

## 2021-12-03 RX ADMIN — FENTANYL CITRATE 50 MCG: 50 INJECTION, SOLUTION INTRAMUSCULAR; INTRAVENOUS at 12:03

## 2021-12-03 RX ADMIN — CEFAZOLIN SODIUM 2 G: 2 INJECTION, SOLUTION INTRAVENOUS at 11:39

## 2021-12-03 RX ADMIN — SODIUM CHLORIDE, POTASSIUM CHLORIDE, SODIUM LACTATE AND CALCIUM CHLORIDE: 600; 310; 30; 20 INJECTION, SOLUTION INTRAVENOUS at 11:37

## 2021-12-03 RX ADMIN — HYDROMORPHONE HYDROCHLORIDE 0.5 MG: 1 INJECTION, SOLUTION INTRAMUSCULAR; INTRAVENOUS; SUBCUTANEOUS at 13:11

## 2021-12-03 RX ADMIN — Medication 10 MCG: at 12:03

## 2021-12-03 RX ADMIN — FENTANYL CITRATE 50 MCG: 50 INJECTION, SOLUTION INTRAMUSCULAR; INTRAVENOUS at 14:44

## 2021-12-03 RX ADMIN — Medication 10 MCG: at 13:27

## 2021-12-03 RX ADMIN — OXYCODONE HYDROCHLORIDE 5 MG: 5 TABLET ORAL at 16:52

## 2021-12-03 RX ADMIN — KETOROLAC TROMETHAMINE 30 MG: 30 INJECTION, SOLUTION INTRAMUSCULAR at 13:56

## 2021-12-03 RX ADMIN — DEXAMETHASONE SODIUM PHOSPHATE 10 MG: 10 INJECTION, SOLUTION INTRAMUSCULAR; INTRAVENOUS at 12:43

## 2021-12-03 RX ADMIN — HYDROMORPHONE HYDROCHLORIDE 0.5 MG: 1 INJECTION, SOLUTION INTRAMUSCULAR; INTRAVENOUS; SUBCUTANEOUS at 12:52

## 2021-12-03 RX ADMIN — DIMENHYDRINATE 12.5 MG: 50 INJECTION, SOLUTION INTRAMUSCULAR; INTRAVENOUS at 17:35

## 2021-12-03 RX ADMIN — SUGAMMADEX 200 MG: 100 INJECTION, SOLUTION INTRAVENOUS at 14:03

## 2021-12-03 RX ADMIN — ROCURONIUM BROMIDE 15 MG: 50 INJECTION, SOLUTION INTRAVENOUS at 13:21

## 2021-12-03 RX ADMIN — LIDOCAINE HYDROCHLORIDE 80 MG: 20 INJECTION, SOLUTION INFILTRATION; PERINEURAL at 12:19

## 2021-12-03 RX ADMIN — SODIUM CHLORIDE, POTASSIUM CHLORIDE, SODIUM LACTATE AND CALCIUM CHLORIDE: 600; 310; 30; 20 INJECTION, SOLUTION INTRAVENOUS at 14:25

## 2021-12-03 RX ADMIN — PROPOFOL 150 MG: 10 INJECTION, EMULSION INTRAVENOUS at 12:19

## 2021-12-03 RX ADMIN — LIDOCAINE HYDROCHLORIDE 1 ML: 10 INJECTION, SOLUTION EPIDURAL; INFILTRATION; INTRACAUDAL; PERINEURAL at 11:38

## 2021-12-03 RX ADMIN — ONDANSETRON 4 MG: 2 INJECTION INTRAMUSCULAR; INTRAVENOUS at 12:43

## 2021-12-03 RX ADMIN — PROPOFOL 100 MCG/KG/MIN: 10 INJECTION, EMULSION INTRAVENOUS at 12:19

## 2021-12-03 RX ADMIN — ROCURONIUM BROMIDE 50 MG: 50 INJECTION, SOLUTION INTRAVENOUS at 12:19

## 2021-12-03 RX ADMIN — ONDANSETRON 4 MG: 2 INJECTION INTRAMUSCULAR; INTRAVENOUS at 16:49

## 2021-12-03 NOTE — ANESTHESIA POSTPROCEDURE EVALUATION
Patient: Ruby Morrison    Procedure: Procedure(s):  HERNIORRHAPHY, VENTRAL, ROBOT-ASSISTED with mesh       Diagnosis:Ventral hernia without obstruction or gangrene [K43.9]  Diagnosis Additional Information: No value filed.    Anesthesia Type:  General    Note:  Disposition: Outpatient   Postop Pain Control: Uneventful            Sign Out: Well controlled pain   PONV: Yes            Symptoms: Nausea + Vomiting            Sign Out: PONV/POV resolved with treatment   Neuro/Psych: Uneventful            Sign Out: Acceptable/Baseline neuro status   Airway/Respiratory: Uneventful            Sign Out: Acceptable/Baseline resp. status   CV/Hemodynamics: Uneventful            Sign Out: Acceptable CV status   Other NRE: NONE   DID A NON-ROUTINE EVENT OCCUR? No    Event details/Postop Comments:  Pt was happy with anesthesia care.  No complications. Slightly nauseated when moving.  Did cause her to have an emesis.  Will order IV Dramamine and have RN apply scope patch.  I will follow up with the pt if needed.           Last vitals:  Vitals Value Taken Time   BP 96/64 12/03/21 1530   Temp 97.3  F (36.3  C) 12/03/21 1526   Pulse 68 12/03/21 1530   Resp 11 12/03/21 1530   SpO2 96 % 12/03/21 1530       Electronically Signed By: YOBANI Coronel CRNA  December 3, 2021  5:25 PM

## 2021-12-03 NOTE — ANESTHESIA PROCEDURE NOTES
Airway       Patient location during procedure: OR       Procedure Start/Stop Times: 12/3/2021 12:21 PM  Staff -        CRNA: Gaurav Amaya APRN CRNA       Performed By: CRNA  Consent for Airway        Urgency: elective  Indications and Patient Condition       Indications for airway management: anjum-procedural       Induction type:intravenous       Mask difficulty assessment: 1 - vent by mask    Final Airway Details       Final airway type: endotracheal airway       Successful airway: ETT - single  Endotracheal Airway Details        Cuffed: yes       Successful intubation technique: direct laryngoscopy       DL Blade Type: MAC 3       Grade View of Cords: 1       Adjucts: stylet       Position: Right       Measured from: lips       Secured at (cm): 22       Bite block used: Oral Airway    Post intubation assessment        Placement verified by: capnometry, equal breath sounds and chest rise        Number of attempts at approach: 1       Number of other approaches attempted: 0       Secured with: silk tape       Ease of procedure: easy       Dentition: Intact and Unchanged

## 2021-12-03 NOTE — BRIEF OP NOTE
[facility] Brief Operative Note    Pre-operative diagnosis: Ventral hernia without obstruction or gangrene [K43.9]   Post-operative diagnosis Ventral hernia   Procedure: Procedure(s):  HERNIORRHAPHY, VENTRAL, ROBOT-ASSISTED with mesh   Surgeon(s): Surgeon(s) and Role:     * Peewee Coombs, DO - Primary   Estimated blood loss: 20 ml    Specimens: * No specimens in log *   Findings: ~2cm supraumbilical hernia

## 2021-12-03 NOTE — ANESTHESIA CARE TRANSFER NOTE
Patient: Rbuy Morrison    Procedure: Procedure(s):  HERNIORRHAPHY, VENTRAL, ROBOT-ASSISTED with mesh       Diagnosis: Ventral hernia without obstruction or gangrene [K43.9]  Diagnosis Additional Information: No value filed.    Anesthesia Type:   General     Note:    Oropharynx: spontaneously breathing and oral airway in place  Level of Consciousness: unresponsive  Oxygen Supplementation: face mask and nasal cannula  Level of Supplemental Oxygen (L/min / FiO2): 4 L/M  Independent Airway: airway patency satisfactory and stable  Dentition: dentition unchanged  Vital Signs Stable: post-procedure vital signs reviewed and stable  Report to RN Given: handoff report given  Patient transferred to: PACU    Handoff Report: Identifed the Patient, Identified the Reponsible Provider, Reviewed the pertinent medical history, Discussed the surgical course, Reviewed Intra-OP anesthesia mangement and issues during anesthesia, Set expectations for post-procedure period and Allowed opportunity for questions and acknowledgement of understanding      Vitals:  Vitals Value Taken Time   /58 12/03/21 1425   Temp 96.26  F (35.7  C) 12/03/21 1427   Pulse 79 12/03/21 1427   Resp 8 12/03/21 1427   SpO2 98 % 12/03/21 1427   Vitals shown include unvalidated device data.    Electronically Signed By: YOBANI Coronel CRNA  December 3, 2021  2:28 PM

## 2021-12-03 NOTE — DISCHARGE INSTRUCTIONS
[facility]    Home Care Following Hernia Repair     Dr. Coombs    Hernia Type:  Ventral    Care of the Incision:      Surgical glue was used to close your 4 incision sites, keep your incision dry for 24 hours ,then you may shower, but don t submerge under water for at least 2 weeks.  Gently pat your incision dry with a freshly laundered towel.    Do not touch your incision with bare hands or pick at scabs.    Leave your incision open to air.  Cover it only if clothing rubs or irritates it.  Activity:    Gradually increase your activity.  Walk short distances several times each day and increase the distance as your strength allows.    To promote circulation, do not cross your legs while sitting.    No strenuous lifting or straining for 2 weeks.   Do not lift anything over 20 pounds for 2 weeks.    Return to work will be determined by the type of work you do and should be discussed with your physician.    Do not drive or operate equipment while taking prescription pain medicines.  You may drive 1 week after surgery if you have stopped taking prescription pain medicines and are pain-free enough to react quickly and make an emergency stop if necessary.    Diet:    Return to the diet you were on before surgery.    Drink plenty of  water.    Avoid foods that cause constipation.      REMEMBER--most prescription pain pills cause constipation.  Walking, extra fluids, and increased fiber (fresh fruits and vegetables, etc.) are natural remedies for constipation.  You can also take mineral oil, 1-2 Tablespoons per day.  If still constipated you may try a stool softener such as Colace or Miralax.    Call Your Physician if You Have:    Redness, increased swelling or cloudy drainage from your incision.    A temperature of more than 101 degrees F.    Worsening pain in your incision not relieved by your prescription pain pills and/or a short rest.    Any questions or concerns about your recovery, please call          Business hours [phone]    After hours [phone] Nurse Advice Line (24 hours a day)    Follow-up Care:  You have a virtual visit with Dr. Coombs scheduled.  If you have questions/concerns for Dr. Coombs prior to the virtual visit, call [facility] at [phone].    [facility] Same-Day Surgery   Adult Discharge Orders & Instructions     For 24 hours after surgery    1. Get plenty of rest.  A responsible adult must stay with you for at least 24 hours after you leave the hospital.   2. Do not drive or use heavy equipment.  If you have weakness or tingling, don't drive or use heavy equipment until this feeling goes away.  3. Do not drink alcohol.  4. Avoid strenuous or risky activities.  Ask for help when climbing stairs.   5. You may feel lightheaded.  If so, sit for a few minutes before standing.  Have someone help you get up.   6. You may have a slight fever. Call the doctor if your fever is over 100 F (37.7 C) (taken under the tongue) or lasts longer than 24 hours.  7. You may have a dry mouth, a sore throat, muscle aches or trouble sleeping.  These should go away after 24 hours.  8. Do not make important or legal decisions.  We don t expect you to have any problems from the surgery or treatment you had today. Just in case, here s what to do if you have pain, upset stomach (nausea), bleeding or infection:  Pain:  Take medicines your doctor has prescribed or over-the-counter medicine they have suggested. Resting and using ice packs can help, too. For surgery on an arm or leg, raise it on a pillow to ease swelling. Call your doctor if these methods don t work.  Copyright [employer], Licensed under [employer]  Upset stomach (nausea):  Take anti-nausea medicine approved by your doctor. Drink clear liquids like apple juice, ginger ale, broth or 7-Up. Be sure to drink enough fluids. Rest can help, too. Move to normal foods when you re ready.   Bleeding:  Surgical  glue was used to close your 4 incision sites.  If you notice blood building up under the glue, apply fresh gauze and gentle but firm pressure and call your surgeon.  Copyright [employer], Licensed under [employer]  Fever/Infection: Please call your doctor if you have any of these signs:    Redness    Swelling    Wound feels warm    Pain gets worse    Bad-smelling fluid leaks from wound    Fever or chills  Call your doctor for any of the followin.  It has been over 8 to 10 hours since surgery and you are still not able to urinate (pass water).    2.  Headache for over 24 hours.    Nurse advice line: [phone]  You may take ibuprofen after 8 p.m.

## 2021-12-03 NOTE — ANESTHESIA PREPROCEDURE EVALUATION
Anesthesia Pre-Procedure Evaluation    Patient: Ruby Morrison   MRN: [id number] : 1985        Preoperative Diagnosis: Ventral hernia without obstruction or gangrene [K43.9]    Procedure : Procedure(s):  HERNIORRHAPHY, VENTRAL, ROBOT-ASSISTED with mesh          Past Medical History:   Diagnosis Date     Asthma      Cat allergies      Encounter for supervision of other normal pregnancy 2015     GERD (gastroesophageal reflux disease)      Group B Streptococcus urinary tract infection affecting pregnancy in first trimester 2017     Hx of previous reproductive problem      Hypertension     during pregnancy     Mixed hyperlipidemia 2019      Past Surgical History:   Procedure Laterality Date      SECTION N/A 10/1/2020    Procedure:  SECTION;  Surgeon: Abigail Burns MD;  [facility]: [facility]     HYMENOTOMY       LASIK Bilateral 2020    DR. Zarco      No Known Allergies   Social History     Tobacco Use     Smoking status: Never Smoker     Smokeless tobacco: Never Used   Substance Use Topics     Alcohol use: Not Currently     Alcohol/week: 0.0 standard drinks     Comment: Maybe once every 6 months      Wt Readings from Last 1 Encounters:   21 87.5 kg (193 lb)        Anesthesia Evaluation   Pt has had prior anesthetic. Type: General, MAC and Regional.    No history of anesthetic complications       ROS/MED HX  ENT/Pulmonary: Comment: Asthma with animal dander and extremely hot weather    (+) Mild Persistent, asthma Treatment: Inhaler prn,      Neurologic:  - neg neurologic ROS     Cardiovascular:     (+) Dyslipidemia hypertension-----    METS/Exercise Tolerance:     Hematologic:  - neg hematologic  ROS     Musculoskeletal:  - neg musculoskeletal ROS     GI/Hepatic:     (+) GERD,     Renal/Genitourinary: Comment: PCOS (polycystic ovarian syndrome)      Endo: Comment: BMI 36.0    (+) Obesity,     Psychiatric/Substance Use:  - neg psychiatric ROS     Infectious Disease:   - neg infectious disease ROS     Malignancy:  - neg malignancy ROS     Other:  - neg other ROS          Physical Exam    Airway  airway exam normal      Mallampati: II   TM distance: > 3 FB   Neck ROM: full   Mouth opening: > 3 cm    Respiratory Devices and Support         Dental  no notable dental history         Cardiovascular   cardiovascular exam normal       Rhythm and rate: regular and normal     Pulmonary   pulmonary exam normal        breath sounds clear to auscultation           OUTSIDE LABS:  CBC:   Lab Results   Component Value Date    WBC 19.4 (H) 10/01/2020    WBC 12.9 (H) 10/01/2020    HGB 14.2 11/30/2021    HGB 7.7 (L) 10/05/2020    HCT 28.5 (L) 10/01/2020    HCT 31.8 (L) 10/01/2020     10/01/2020     10/01/2020     BMP:   Lab Results   Component Value Date     09/17/2020     10/10/2018    POTASSIUM 3.9 09/17/2020    POTASSIUM 4.2 10/10/2018    CHLORIDE 105 09/17/2020    CHLORIDE 103 10/10/2018    CO2 25 09/17/2020    CO2 25 10/10/2018    BUN 8 09/17/2020    BUN 9 10/10/2018    CR 0.42 (L) 10/01/2020    CR 0.45 (L) 09/17/2020    GLC 94 10/13/2021    GLC 89 09/17/2020     COAGS: No results found for: PTT, INR, FIBR  POC:   Lab Results   Component Value Date     (H) 10/01/2020    HCG Negative 12/10/2020     HEPATIC:   Lab Results   Component Value Date    ALBUMIN 2.4 (L) 09/17/2020    PROTTOTAL 7.0 09/17/2020    ALT 25 10/01/2020    AST 23 10/01/2020    ALKPHOS 112 09/17/2020    BILITOTAL 0.3 09/17/2020     OTHER:   Lab Results   Component Value Date    A1C 5.8 (H) 01/24/2020    CORIE 9.0 09/17/2020    MAG 1.7 08/06/2015    LIPASE 105 08/06/2015    TSH 1.48 01/24/2020       Anesthesia Plan    ASA Status:  2   NPO Status:  NPO Appropriate    Anesthesia Type: General.     - Airway: ETT   Induction: Intravenous, Propofol.   Maintenance: Inhalation.        Consents    Anesthesia Plan(s) and associated risks, benefits, and realistic alternatives discussed. Questions answered  and patient/representative(s) expressed understanding.    - Discussed:     - Discussed with:  Patient      - Extended Intubation/Ventilatory Support Discussed: No.      - Patient is DNR/DNI Status: No    Use of blood products discussed: No .     Postoperative Care    Pain management: IV analgesics, Multi-modal analgesia, Oral pain medications.   PONV prophylaxis: Ondansetron (or other 5HT-3), Dexamethasone or Solumedrol, Background Propofol Infusion     Comments:    Other Comments: The risks and benefits of anesthesia, and the alternatives where applicable, have been discussed with the patient, and they wish to proceed.            YOBANI Coronel CRNA

## 2021-12-04 NOTE — PROVIDER NOTIFICATION
Pt has continued nausea after receiving zofran.  Discussed with CRNA.   Received orders for dramamine and a scop patch.  Pt became quite somnolent after administration of dramamine 12.5 mg.  Spouse requested to let her sleep for a short time before getting her to the edge of the bed. No reports of nausea.

## 2021-12-04 NOTE — OP NOTE
Procedure Date: 12/03/2021    PROCEDURE:  Robot-assisted laparoscopic ventral hernia repair with mesh.    PREOPERATIVE DIAGNOSIS:  Ventral hernia.    POSTOPERATIVE DIAGNOSIS:  Ventral hernia.    SURGEON:  Peewee Coombs DO    ASSISTANT:  None.    ANESTHESIA:  General endotracheal anesthesia.    COMPLICATIONS:  None immediately apparent.    SPECIMENS:  None.    ESTIMATED BLOOD LOSS:  20 mL.    INDICATIONS FOR PROCEDURE:  Taylor is a 36-year-old female who presented to the surgical clinic with periumbilical and mostly supraumbilical painful bulge.  She has a history of having 5 children; last delivery was actually triplets over a year ago.  She was certain, along with her , that she was not interested in having any more pregnancies and is actively using contraception to avoid any more pregnancies.  I recommended robot-assisted laparoscopic ventral hernia repair with mesh for her symptomatic hernia.  We discussed the procedure in detail, as well as the risks, benefits and alternatives, postoperative care and restrictions.  After informed discussion, we agreed to proceed with surgery.    DESCRIPTION OF PROCEDURE:  After informed consent was obtained, the patient was brought to the preoperative holding area, taken to the operating room and placed in the supine position, where anesthesia was induced.  She was prepped and draped in normal sterile fashion.  Timeout was performed.  After correct patient and correct procedure were verified, we began by making a left upper quadrant 8 mm incision.  Veress needle was attempted to be inserted into the peritoneal cavity, but I could not get into the peritoneal cavity with confidence.  I then switched to a 5 mm camera and a Visiport, so using the Visiport, I directly entered the peritoneal cavity.  With visualization, the abdomen was then insufflated to 15 mmHg.  General survey of the abdomen revealed a supraumbilical hernia, no other gross abnormalities.  I placed an 8.5  mm robotic camera trocar in the left mid abdomen and an 8 mm robotic trocar in the left lower quadrant.  The 5 mm trocars were placed with 8 mm robotic trocar in the left upper quadrant.  I chose an 11.4 cm round Ventralight ST Echo mesh for the repair.  This was placed through the left lower quadrant incision.  Two 9 inch 2-0 Stratafix sutures and an 0 Stratafix suture were all placed in the peritoneal cavity as well.  The robot was then docked.  Fenestrated bipolar grasper was used on the left arm and a monopolar scissors on the right arm.  We began by incising the peritoneum just superior to the hernia defect several centimeters.  The peritoneum and preperitoneal fat were dissected away from the posterior sheath.  Dissection was brought down through the hernia, reducing the hernia sac and contents into the peritoneal cavity.  Once an appropriate space of the periumbilical area surrounding the hernia was dissected, I switched to a needle  and, using the 0 Stratafix suture, closed the hernia defect primarily in a running fashion.  A small nick incision was made supraumbilically, and the PMI grasper was used to grasp the catheter attached to the center of the mesh.  This was brought through the abdominal wall.  The scaffolding was insufflated.  I then secured the mesh into place using 2-0 Stratafix sutures in a running continuous fashion.  The catheter was cut externally, and the scaffolding was removed.  The mesh appeared to be in excellent position.  We then removed the scaffolding under direct visualization as well as the 3 sutures with the needles intact without difficulty through the left lower quadrant port.  Another survey of the operative field revealed no apparent complications.  The robot was then undocked.  The patient's abdomen was then desufflated while the ports were removed under direct visualization.  The skin was instilled with 0.25% Marcaine with epinephrine for local anesthesia.  Skin was  closed with inverted interrupted 4-0 Monocryl sutures, and Exofin dressing was applied.  At the completion of the case, all instruments, needles and sponges were accounted for after a correct count.  The patient was then awoken from anesthesia and brought to the recovery room in stable condition.    Peewee Coombs DO        D: 2021   T: 2021   MT: [id number]    Name:     KELSY SHIPMAN  MRN:      [id number]        Account:        [id number]   :      1985           Procedure Date: 2021     Document: [id number]

## 2021-12-04 NOTE — TELEPHONE ENCOUNTER
Reason for Disposition    Caller has medication question only, adult not sick, and triager answers question    Protocols used: MEDICATION QUESTION CALL-A-    Patient calling to see when last dose of   oxyCODONE (ROXICODONE) tablet 5 mg 5 mg EVERY 4 HOURS PRN 12/3/2021  --   Admin Instructions: Max: 5 mg for opioid-naïve patient.     Was given  Gave per dee dee Yang RN [facility]

## 2021-12-20 ENCOUNTER — TELEPHONE (OUTPATIENT)
Dept: FAMILY MEDICINE | Facility: CLINIC | Age: 36
End: 2021-12-20
Payer: COMMERCIAL

## 2021-12-20 DIAGNOSIS — J45.30 MILD PERSISTENT ASTHMA WITHOUT COMPLICATION: ICD-10-CM

## 2021-12-23 RX ORDER — MONTELUKAST SODIUM 10 MG/1
TABLET ORAL
Qty: 90 TABLET | Refills: 3 | OUTPATIENT
Start: 2021-12-23

## 2021-12-23 NOTE — TELEPHONE ENCOUNTER
Routing to team      Pt needs annual exam and ACT.  Please assist in scheduling then route back for additional marilu refill      Tomasa Denny RN

## 2021-12-24 ENCOUNTER — MYC REFILL (OUTPATIENT)
Dept: FAMILY MEDICINE | Facility: CLINIC | Age: 36
End: 2021-12-24
Payer: COMMERCIAL

## 2021-12-24 ENCOUNTER — MYC MEDICAL ADVICE (OUTPATIENT)
Dept: FAMILY MEDICINE | Facility: OTHER | Age: 36
End: 2021-12-24
Payer: COMMERCIAL

## 2021-12-24 DIAGNOSIS — J45.30 MILD PERSISTENT ASTHMA WITHOUT COMPLICATION: ICD-10-CM

## 2021-12-27 RX ORDER — ALBUTEROL SULFATE 90 UG/1
2 AEROSOL, METERED RESPIRATORY (INHALATION) EVERY 4 HOURS PRN
Qty: 18 G | Refills: 0 | Status: SHIPPED | OUTPATIENT
Start: 2021-12-27

## 2021-12-27 RX ORDER — MONTELUKAST SODIUM 10 MG/1
TABLET ORAL
Qty: 90 TABLET | Refills: 0 | Status: SHIPPED | OUTPATIENT
Start: 2021-12-27 | End: 2022-03-21

## 2021-12-27 NOTE — TELEPHONE ENCOUNTER
Pt requests refill of:    Pending Prescriptions:                       Disp   Refills    albuterol (PROAIR HFA/PROVENTIL HFA/ZEFERINO*                    Sig: Inhale 2 puffs into the lungs every 4 hours as           needed for shortness of breath / dyspnea    Jordyn Duvall RN on 12/27/2021 at 11:42 AM

## 2021-12-27 NOTE — TELEPHONE ENCOUNTER
Pending Prescriptions:                       Disp   Refills    albuterol (PROAIR HFA/PROVENTIL HFA/VENTOL*                Sig: Inhale 2 puffs into the lungs every 4 hours as needed           for shortness of breath / dyspnea    Routing refill request to provider for review/approval because:  Labs out of range:  RASHAUN Duvall RN on 12/27/2021 at 11:44 AM

## 2021-12-27 NOTE — TELEPHONE ENCOUNTER
Called patient and she had her physical a couple of months ago.  Patient is having her med request sent to the provider who saw her for her physical.    JASON Spann  [facility]

## 2022-03-21 DIAGNOSIS — J45.30 MILD PERSISTENT ASTHMA WITHOUT COMPLICATION: ICD-10-CM

## 2022-03-21 RX ORDER — MONTELUKAST SODIUM 10 MG/1
TABLET ORAL
Qty: 90 TABLET | Refills: 3 | Status: SHIPPED | OUTPATIENT
Start: 2022-03-21 | End: 2023-03-20

## 2022-03-21 NOTE — TELEPHONE ENCOUNTER
Routing refill request to provider for review/approval because:  Patient needs to be seen because:  recent 6 month visit    Asthma assess score WNL in last 6 months    ACT Total Scores 12/3/2019 1/18/2021 10/13/2021   ACT TOTAL SCORE - - -   ASTHMA ER VISITS - - -   ASTHMA HOSPITALIZATIONS - - -   ACT TOTAL SCORE (Goal Greater than or Equal to 20) 21 25 -   In the past 12 months, how many times did you visit the emergency room for your asthma without being admitted to the hospital? 0 0 0   In the past 12 months, how many times were you hospitalized overnight because of your asthma? 0 0 0     Melissa Mera RN

## 2022-03-24 ASSESSMENT — ASTHMA QUESTIONNAIRES: ACT_TOTALSCORE: 25

## 2022-09-03 ENCOUNTER — HEALTH MAINTENANCE LETTER (OUTPATIENT)
Age: 37
End: 2022-09-03

## 2023-01-15 ENCOUNTER — HEALTH MAINTENANCE LETTER (OUTPATIENT)
Age: 38
End: 2023-01-15

## 2023-03-15 DIAGNOSIS — J45.30 MILD PERSISTENT ASTHMA WITHOUT COMPLICATION: ICD-10-CM

## 2023-03-17 NOTE — TELEPHONE ENCOUNTER
"Requested Prescriptions   Pending Prescriptions Disp Refills    montelukast (SINGULAIR) 10 MG tablet [Pharmacy Med Name: MONTELUKAST SODIUM TABS 10MG] 90 tablet 3     Sig: TAKE 1 TABLET AT BEDTIME       Leukotriene Inhibitors Protocol Failed - 3/15/2023 11:58 PM        Failed - Asthma control assessment score within normal limits in last 6 months     Please review ACT score.           Failed - Recent (6 mo) or future (30 days) visit within the authorizing provider's specialty     Patient had office visit in the last 6 months or has a visit in the next 30 days with authorizing provider or within the authorizing provider's specialty.  See \"Patient Info\" tab in inbasket, or \"Choose Columns\" in Meds & Orders section of the refill encounter.            Passed - Patient is age 12 or older     If patient is under 16, ok to refill using age based dosing.           Passed - Medication is active on med list             "

## 2023-03-20 RX ORDER — MONTELUKAST SODIUM 10 MG/1
TABLET ORAL
Qty: 30 TABLET | Refills: 0 | Status: SHIPPED | OUTPATIENT
Start: 2023-03-20

## 2023-03-20 NOTE — TELEPHONE ENCOUNTER
Patient has not been seen since 2021.  Please advised to schedule a physical.  Short prescription sent in the interim

## 2024-02-17 ENCOUNTER — HEALTH MAINTENANCE LETTER (OUTPATIENT)
Age: 39
End: 2024-02-17

## 2024-12-03 NOTE — DISCHARGE INSTRUCTIONS
Addended by: JAMES BAR on: 12/3/2024 09:48 AM     Modules accepted: Orders     Postop  Birth Instructions    Activity       Do not lift more than 10 pounds for 6 weeks after surgery.  Ask family and friends for help when you need it.    No driving until you have stopped taking your pain medications (usually two weeks after surgery).    No heavy exercise or activity for 6 weeks.  Don't do anything that will put a strain on your surgery site.    Don't strain when using the toilet.  Your care team may prescribe a stool softener if you have problems with your bowel movements.     To care for your incision:       Keep the incision clean and dry.    Do not soak your incision in water. No swimming or hot tubs until it has fully healed. You may soak in the bathtub if the water level is below your incision.    Do not use peroxide, gel, cream, lotion, or ointment on your incision.    Adjust your clothes to avoid pressure on your surgery site (check the elastic in your underwear for example).     You may see a small amount of clear or pink drainage and this is normal.  Check with your health care provider:       If the drainage increases or has an odor.    If the incision reddens, you have swelling, or develop a rash.    If you have increased pain and the medicine we prescribed doesn't help.    If you have a fever above 100.4 F (38 C) with or without chills when placing thermometer under your tongue.   The area around your incision (surgery wound), will feel numb.  This is normal. The numbness should go away in less than a year.     Keep your hands clean:  Always wash your hands before touching your incision (surgery wound). This helps reduce your risk of infection. If your hands aren't dirty, you may use an alcohol hand-rub to clean your hands. Keep your nails clean and short.    Call your healthcare provider if you have any of these symptoms:       You soak a sanitary pad with blood within 1 hour, or you see blood clots larger than a golf ball.    Bleeding that lasts more than 6  weeks.    Vaginal discharge that smells bad.    Severe pain, cramping or tenderness in your lower belly area.    A need to urinate more frequently (use the toilet more often), more urgently (use the toilet very quickly), or it burns when you urinate.    Nausea and vomiting.    Redness, swelling or pain around a vein in your leg.    Problems breastfeeding or a red or painful area on your breast.    Chest pain and cough or are gasping for air.    Problems with coping with sadness, anxiety or depression. If you have concerns about hurting yourself or the baby, call your provider immediately.      You have questions or concerns after you return home.

## (undated) DEVICE — KIT PATIENT POSITIONING PIGAZZI LATEX FREE 40580

## (undated) DEVICE — SUCTION MANIFOLD NEPTUNE 2 SYS 4 PORT 0702-020-000

## (undated) DEVICE — STOCKING SLEEVE COMPRESSION CALF LG

## (undated) DEVICE — PACK C-SECTION LF PL15OTA83B

## (undated) DEVICE — SOL WATER IRRIG 1000ML BOTTLE 07139-09

## (undated) DEVICE — GLOVE ESTEEM POWDER FREE SMT 7.5  2D72PT75

## (undated) DEVICE — ESU GROUND PAD UNIVERSAL W/O CORD

## (undated) DEVICE — SOL WATER IRRIG 1000ML BOTTLE 2F7114

## (undated) DEVICE — DEVICE SUTURE GRASPER TROCAR CLOSURE 14GA PMITCSG

## (undated) DEVICE — BASIN SET MAJOR

## (undated) DEVICE — SU MONOCRYL 0 CTB-1 36" YB946

## (undated) DEVICE — DAVINCI HOT SHEARS TIP COVER  400180

## (undated) DEVICE — PREP CHLORAPREP 26ML TINTED ORANGE  260815

## (undated) DEVICE — SU STRATAFIX PDS PLUS 2-0 SPIRAL SH 23CM SXPP1B433

## (undated) DEVICE — DAVINCI SI DRAPE ACCESSORY KIT 3-ARM 420290

## (undated) DEVICE — SU VICRYL 4-0 KS 27" UND J662H

## (undated) DEVICE — DAVINCI OBTURATOR 8MM BLADELESS 420023

## (undated) DEVICE — CATH TRAY FOLEY 16FR BARDEX W/DRAIN BAG STATLOCK 300316A

## (undated) DEVICE — SU VICRYL 0 CT-1 36" J346H

## (undated) DEVICE — SOL NACL 0.9% IRRIG 1000ML BOTTLE 07138-09

## (undated) DEVICE — STRAP KNEE/BODY 31143004

## (undated) DEVICE — SU MONOCRYL 4-0 PS-2 18" UND Y496G

## (undated) DEVICE — SUCTION CANISTER MEDIVAC LINER 1500ML W/LID 65651-515

## (undated) DEVICE — GLOVE PROTEXIS BLUE W/NEU-THERA 7.5  2D73EB75

## (undated) DEVICE — DRSG TEGADERM 2 3/8X2 3/4" 1624W

## (undated) DEVICE — RETR PANNICULUS TRAXI FOR PT POSITIONING PRS-1030

## (undated) DEVICE — ADH SKIN CLOSURE PREMIERPRO EXOFIN 1.0ML 3470

## (undated) DEVICE — PACK GENERAL LAPAOSCOPY

## (undated) DEVICE — SU STRATAFIX PDS PLUS 2-0 SPIRAL SH 30CM SXPP1B416

## (undated) DEVICE — NDL INSUFFLATION 120MM VERRES 172015

## (undated) DEVICE — SPONGE TONSIL W/STRING MED

## (undated) DEVICE — DAVINCI S CANNULA SEAL 8.5-13MM 420206

## (undated) DEVICE — GLOVE PROTEXIS W/NEU-THERA 7.5  2D73TE75

## (undated) DEVICE — GLOVE ESTEEM BLUE W/NEU-THERA 8.0  2D73PB80

## (undated) DEVICE — SYSTEM CLEARIFY VISUALIZATION 21-345

## (undated) RX ORDER — ONDANSETRON 2 MG/ML
INJECTION INTRAMUSCULAR; INTRAVENOUS
Status: DISPENSED
Start: 2021-12-03

## (undated) RX ORDER — MORPHINE SULFATE 1 MG/ML
INJECTION, SOLUTION EPIDURAL; INTRATHECAL; INTRAVENOUS
Status: DISPENSED
Start: 2020-10-01

## (undated) RX ORDER — OXYTOCIN/0.9 % SODIUM CHLORIDE 30/500 ML
PLASTIC BAG, INJECTION (ML) INTRAVENOUS
Status: DISPENSED
Start: 2020-10-01

## (undated) RX ORDER — ONDANSETRON 2 MG/ML
INJECTION INTRAMUSCULAR; INTRAVENOUS
Status: DISPENSED
Start: 2020-10-01

## (undated) RX ORDER — FENTANYL CITRATE 50 UG/ML
INJECTION, SOLUTION INTRAMUSCULAR; INTRAVENOUS
Status: DISPENSED
Start: 2020-10-01

## (undated) RX ORDER — PROPOFOL 10 MG/ML
INJECTION, EMULSION INTRAVENOUS
Status: DISPENSED
Start: 2021-12-03

## (undated) RX ORDER — KETOROLAC TROMETHAMINE 30 MG/ML
INJECTION, SOLUTION INTRAMUSCULAR; INTRAVENOUS
Status: DISPENSED
Start: 2021-12-03

## (undated) RX ORDER — LIDOCAINE HYDROCHLORIDE 20 MG/ML
INJECTION, SOLUTION EPIDURAL; INFILTRATION; INTRACAUDAL; PERINEURAL
Status: DISPENSED
Start: 2021-12-03

## (undated) RX ORDER — HYDROMORPHONE HYDROCHLORIDE 1 MG/ML
INJECTION, SOLUTION INTRAMUSCULAR; INTRAVENOUS; SUBCUTANEOUS
Status: DISPENSED
Start: 2021-12-03

## (undated) RX ORDER — KETOROLAC TROMETHAMINE 30 MG/ML
INJECTION, SOLUTION INTRAMUSCULAR; INTRAVENOUS
Status: DISPENSED
Start: 2020-10-01

## (undated) RX ORDER — DEXMEDETOMIDINE HYDROCHLORIDE 100 UG/ML
INJECTION, SOLUTION INTRAVENOUS
Status: DISPENSED
Start: 2021-12-03

## (undated) RX ORDER — BUPIVACAINE HYDROCHLORIDE 2.5 MG/ML
INJECTION, SOLUTION EPIDURAL; INFILTRATION; INTRACAUDAL
Status: DISPENSED
Start: 2021-12-03

## (undated) RX ORDER — EPINEPHRINE 1 MG/ML
INJECTION, SOLUTION INTRAMUSCULAR; SUBCUTANEOUS
Status: DISPENSED
Start: 2021-12-03

## (undated) RX ORDER — FENTANYL CITRATE 50 UG/ML
INJECTION, SOLUTION INTRAMUSCULAR; INTRAVENOUS
Status: DISPENSED
Start: 2021-12-03

## (undated) RX ORDER — DEXAMETHASONE SODIUM PHOSPHATE 10 MG/ML
INJECTION, SOLUTION INTRAMUSCULAR; INTRAVENOUS
Status: DISPENSED
Start: 2021-12-03